# Patient Record
Sex: MALE | Race: WHITE | NOT HISPANIC OR LATINO | Employment: OTHER | URBAN - METROPOLITAN AREA
[De-identification: names, ages, dates, MRNs, and addresses within clinical notes are randomized per-mention and may not be internally consistent; named-entity substitution may affect disease eponyms.]

---

## 2018-06-01 RX ORDER — LISINOPRIL 10 MG/1
10 TABLET ORAL DAILY
Refills: 0 | COMMUNITY
Start: 2018-04-07 | End: 2021-09-02

## 2018-06-01 RX ORDER — ATORVASTATIN CALCIUM 10 MG/1
10 TABLET, FILM COATED ORAL
Refills: 0 | COMMUNITY
Start: 2018-04-07 | End: 2021-09-03 | Stop reason: HOSPADM

## 2018-06-05 ENCOUNTER — OFFICE VISIT (OUTPATIENT)
Dept: UROLOGY | Facility: MEDICAL CENTER | Age: 83
End: 2018-06-05
Payer: COMMERCIAL

## 2018-06-05 VITALS
WEIGHT: 184 LBS | SYSTOLIC BLOOD PRESSURE: 126 MMHG | DIASTOLIC BLOOD PRESSURE: 62 MMHG | BODY MASS INDEX: 27.25 KG/M2 | HEIGHT: 69 IN

## 2018-06-05 DIAGNOSIS — R31.29 OTHER MICROSCOPIC HEMATURIA: ICD-10-CM

## 2018-06-05 DIAGNOSIS — N40.1 BPH WITH OBSTRUCTION/LOWER URINARY TRACT SYMPTOMS: Primary | ICD-10-CM

## 2018-06-05 DIAGNOSIS — N13.8 BPH WITH OBSTRUCTION/LOWER URINARY TRACT SYMPTOMS: Primary | ICD-10-CM

## 2018-06-05 DIAGNOSIS — R97.20 ELEVATED PSA: ICD-10-CM

## 2018-06-05 PROBLEM — I10 ESSENTIAL HYPERTENSION: Status: ACTIVE | Noted: 2017-07-26

## 2018-06-05 PROBLEM — D17.20 LIPOMA OF AXILLA: Status: ACTIVE | Noted: 2018-06-05

## 2018-06-05 PROBLEM — R07.89 ATYPICAL CHEST PAIN: Status: ACTIVE | Noted: 2018-06-05

## 2018-06-05 PROBLEM — E78.2 MIXED HYPERLIPIDEMIA: Status: ACTIVE | Noted: 2018-06-05

## 2018-06-05 LAB
BACTERIA UR QL AUTO: ABNORMAL /HPF
BILIRUB UR QL STRIP: NEGATIVE
CLARITY UR: CLEAR
COLOR UR: YELLOW
GLUCOSE UR STRIP-MCNC: NEGATIVE MG/DL
HGB UR QL STRIP.AUTO: ABNORMAL
HYALINE CASTS #/AREA URNS LPF: ABNORMAL /LPF
KETONES UR STRIP-MCNC: NEGATIVE MG/DL
LEUKOCYTE ESTERASE UR QL STRIP: NEGATIVE
NITRITE UR QL STRIP: NEGATIVE
NON-SQ EPI CELLS URNS QL MICRO: ABNORMAL /HPF
PH UR STRIP.AUTO: 6.5 [PH] (ref 4.5–8)
PROT UR STRIP-MCNC: NEGATIVE MG/DL
RBC #/AREA URNS AUTO: ABNORMAL /HPF
SL AMB  POCT GLUCOSE, UA: ABNORMAL
SL AMB LEUKOCYTE ESTERASE,UA: ABNORMAL
SL AMB POCT BILIRUBIN,UA: ABNORMAL
SL AMB POCT BLOOD,UA: ABNORMAL
SL AMB POCT CLARITY,UA: CLEAR
SL AMB POCT COLOR,UA: YELLOW
SL AMB POCT KETONES,UA: ABNORMAL
SL AMB POCT NITRITE,UA: ABNORMAL
SL AMB POCT PH,UA: 6.5
SL AMB POCT SPECIFIC GRAVITY,UA: 1.01
SL AMB POCT URINE PROTEIN: ABNORMAL
SL AMB POCT UROBILINOGEN: 0.2
SP GR UR STRIP.AUTO: 1.01 (ref 1–1.03)
UROBILINOGEN UR QL STRIP.AUTO: 0.2 E.U./DL
WBC #/AREA URNS AUTO: ABNORMAL /HPF

## 2018-06-05 PROCEDURE — 4040F PNEUMOC VAC/ADMIN/RCVD: CPT | Performed by: UROLOGY

## 2018-06-05 PROCEDURE — 81001 URINALYSIS AUTO W/SCOPE: CPT | Performed by: UROLOGY

## 2018-06-05 PROCEDURE — 99214 OFFICE O/P EST MOD 30 MIN: CPT | Performed by: UROLOGY

## 2018-06-05 PROCEDURE — 81003 URINALYSIS AUTO W/O SCOPE: CPT | Performed by: UROLOGY

## 2018-06-05 RX ORDER — PRAVASTATIN SODIUM 20 MG
TABLET ORAL
COMMUNITY
End: 2018-07-07 | Stop reason: ALTCHOICE

## 2018-06-05 RX ORDER — ACETAMINOPHEN 160 MG
TABLET,DISINTEGRATING ORAL DAILY
COMMUNITY

## 2018-06-05 NOTE — PROGRESS NOTES
Assessment/Plan:    BPH with obstruction/lower urinary tract symptoms  AUA symptom score is 12  Urinalysis reveals small blood on dipstick  Urine will be sent for microscopic analysis  We discussed treatment options for lower tract symptoms  Specifically we discussed the use of medical therapy  He feels he satisfied with his voiding pattern at this time and does not wish to pursue treatment  We will just continue watchful waiting  Elevated PSA  PSA is 6 48 with 25% free (March 23, 2018)  The risk of prostate cancer was discussed  His exam is benign  Prostate biopsy was discussed  At the conclusion were discussion the patient elected continued observation  Diagnoses and all orders for this visit:    BPH with obstruction/lower urinary tract symptoms  -     POCT urine dip auto non-scope    Elevated PSA  -     PSA, total and free; Future    Other microscopic hematuria  -     Urinalysis with microscopic    Other orders  -     atorvastatin (LIPITOR) 10 mg tablet; Take 10 mg by mouth daily at bedtime  -     lisinopril (ZESTRIL) 10 mg tablet; Take 10 mg by mouth daily  -     aspirin 81 MG tablet; Daily  -     pravastatin (PRAVACHOL) 20 mg tablet; pravastatin 20 mg tablet  -     Cholecalciferol (VITAMIN D3) 2000 units capsule; Daily          Subjective:      Patient ID: Sawyer Sweeney is a 80 y o  male  40-year-old male followed for lower tract symptoms and history of PSA elevation  He reports he is voiding adequately  His stream is slow and he does have hesitancy  He gets up once to twice a night to urinate  He has no urgency or incontinence  There is no history of urinary tract infection  He denies gross hematuria, dysuria or symptoms of infection  Overall he is satisfied with his voiding pattern          The following portions of the patient's history were reviewed and updated as appropriate: allergies, current medications, past family history, past medical history, past social history, past surgical history and problem list     Review of Systems   Constitutional: Negative for chills, diaphoresis, fatigue and fever  HENT: Negative  Eyes: Negative  Respiratory: Negative  Cardiovascular: Negative  Endocrine: Negative  Musculoskeletal: Negative  Skin: Negative  Allergic/Immunologic: Negative  Neurological: Positive for dizziness  Hematological: Negative  Psychiatric/Behavioral: Negative  Objective:      /62 (BP Location: Left arm, Patient Position: Sitting)   Ht 5' 9" (1 753 m)   Wt 83 5 kg (184 lb)   BMI 27 17 kg/m²          Physical Exam   Constitutional: He is oriented to person, place, and time  He appears well-developed and well-nourished  HENT:   Head: Normocephalic and atraumatic  Eyes: Conjunctivae are normal    Neck: Neck supple  Cardiovascular: Normal rate  Pulmonary/Chest: Effort normal    Abdominal: Soft  Bowel sounds are normal  He exhibits no distension and no mass  There is no tenderness  There is no rebound, no guarding and no CVA tenderness  Hernia confirmed negative in the right inguinal area and confirmed negative in the left inguinal area  Genitourinary: Rectum normal, testes normal and penis normal  Prostate is enlarged  Prostate is not tender  Right testis shows no mass  Left testis shows no mass  No phimosis or hypospadias  Genitourinary Comments: Prostate 2+ enlarged and palpably benign  Prostate is smooth, symmetric and free of nodularity  Musculoskeletal: He exhibits no edema  Neurological: He is alert and oriented to person, place, and time  Skin: Skin is warm and dry  Psychiatric: He has a normal mood and affect  His behavior is normal  Judgment and thought content normal    Nursing note and vitals reviewed

## 2018-06-05 NOTE — ASSESSMENT & PLAN NOTE
PSA is 6 48 with 25% free (March 23, 2018)  The risk of prostate cancer was discussed  His exam is benign  Prostate biopsy was discussed  At the conclusion were discussion the patient elected continued observation

## 2018-06-05 NOTE — PATIENT INSTRUCTIONS
Prostate Specific Antigen   AMBULATORY CARE:   A prostate specific antigen (PSA) test  is a blood test used to screen men for prostate cancer  A PSA test is also used to monitor how well prostate cancer treatment is working  Other test that may be done with a PSA test:  A digital rectal exam is usually performed with a PSA test  Your healthcare provider will insert a gloved finger into your rectum to feel if your prostate is large, firm, or has lumps  Who may need a PSA test:  Some experts recommend a PSA test for men ages 48 to 79  They also recommend testing men with a high risk for prostate cancer at age 36 or 39  Risk factors may include being  or having a brother or father with prostate cancer  Other experts may not recommend PSA testing  Your healthcare provider can help you decide if you need a PSA test    What the results of a PSA test mean:  Most healthy men have a PSA level less than 4 ng/mL  If your PSA level is higher than 4 ng/mL you may need more tests  Examples include blood or urine tests, an ultrasound, MRI, CT scan, or a prostate biopsy  Ask your healthcare provider for more information on these tests  Other causes of a high PSA level:  A high PSA level does not always mean you have prostate cancer  Certain conditions or procedures can increase PSA levels  Examples include:  · Older age    · Procedures such as a prostate biopsy or a cystoscopy    · An enlarged prostate    · Recent sexual activity    · A prostate infection    · Certain exercises that put pressure on the prostate such as bicycling    · Medicine such as testosterone  © 2017 [employer] Information is for End User's use only and may not be sold, redistributed or otherwise used for commercial purposes  All illustrations and images included in CareNotes® are the copyrighted property of [employer]  or Stephan Enriquez  The above information is an  only   It is not intended as medical advice for individual conditions or treatments  Talk to your doctor, nurse or pharmacist before following any medical regimen to see if it is safe and effective for you

## 2018-06-05 NOTE — LETTER
June 5, 2018     [address]  [address] [facility] [address]    Patient: Terry Woodard   YOB: 1935   Date of Visit: 6/5/2018       Dear Dr Rubio Pina:    Thank you for referring Vannessa Santiago to me for evaluation  Below are my notes for this consultation  If you have questions, please do not hesitate to call me  I look forward to following your patient along with you  Sincerely,        Hernan Archer MD        CC: No Recipients  Hernan Archer MD  6/5/2018  1:34 PM  Incomplete  Assessment/Plan:    No problem-specific Assessment & Plan notes found for this encounter  Diagnoses and all orders for this visit:    BPH with obstruction/lower urinary tract symptoms  -     POCT urine dip auto non-scope    Other orders  -     atorvastatin (LIPITOR) 10 mg tablet; Take 10 mg by mouth daily at bedtime  -     lisinopril (ZESTRIL) 10 mg tablet; Take 10 mg by mouth daily  -     aspirin 81 MG tablet; Daily  -     pravastatin (PRAVACHOL) 20 mg tablet; pravastatin 20 mg tablet  -     Cholecalciferol (VITAMIN D3) 2000 units capsule; Daily          Subjective:      Patient ID: Terry Woodard is a 80 y o  male      HPI    The following portions of the patient's history were reviewed and updated as appropriate: allergies, current medications, past family history, past medical history, past social history, past surgical history and problem list     Review of Systems      Objective:      /62 (BP Location: Left arm, Patient Position: Sitting)   Ht 5' 9" (1 753 m)   Wt 83 5 kg (184 lb)   BMI 27 17 kg/m²           Physical Exam

## 2018-06-05 NOTE — ASSESSMENT & PLAN NOTE
AUA symptom score is 12  Urinalysis reveals small blood on dipstick  Urine will be sent for microscopic analysis  We discussed treatment options for lower tract symptoms  Specifically we discussed the use of medical therapy  He feels he satisfied with his voiding pattern at this time and does not wish to pursue treatment  We will just continue watchful waiting

## 2018-06-05 NOTE — LETTER
June 5, 2018     [address]  [address] [facility] [address]    Patient: Danny Guerrero   YOB: 1935   Date of Visit: 6/5/2018       Dear Dr Wallace Mohs:    Thank you for referring Ketty Goddard to me for evaluation  Below are my notes for this consultation  If you have questions, please do not hesitate to call me  I look forward to following your patient along with you  Sincerely,        Teresa Gleason MD        CC: No Recipients  Teresa Gleason MD  6/5/2018  1:34 PM  Incomplete  Assessment/Plan:    No problem-specific Assessment & Plan notes found for this encounter  Diagnoses and all orders for this visit:    BPH with obstruction/lower urinary tract symptoms  -     POCT urine dip auto non-scope    Other orders  -     atorvastatin (LIPITOR) 10 mg tablet; Take 10 mg by mouth daily at bedtime  -     lisinopril (ZESTRIL) 10 mg tablet; Take 10 mg by mouth daily  -     aspirin 81 MG tablet; Daily  -     pravastatin (PRAVACHOL) 20 mg tablet; pravastatin 20 mg tablet  -     Cholecalciferol (VITAMIN D3) 2000 units capsule; Daily          Subjective:      Patient ID: Danny Guerrero is a 80 y o  male      HPI    The following portions of the patient's history were reviewed and updated as appropriate: allergies, current medications, past family history, past medical history, past social history, past surgical history and problem list     Review of Systems      Objective:      /62 (BP Location: Left arm, Patient Position: Sitting)   Ht 5' 9" (1 753 m)   Wt 83 5 kg (184 lb)   BMI 27 17 kg/m²           Physical Exam

## 2018-06-05 NOTE — PROGRESS NOTES
IPSS Questionnaire (AUA-7): Over the past month    1)  How often have you had a sensation of not emptying your bladder completely after you finish urinating? 2 - Less than half the time   2)  How often have you had to urinate again less than two hours after you finished urinating? 2 - Less than half the time   3)  How often have you found you stopped and started again several times when you urinated? 1 - Less than 1 time in 5   4) How difficult have you found it to postpone urination? 1 - Less than 1 time in 5   5) How often have you had a weak urinary stream?  2 - Less than half the time   6) How often have you had to push or strain to begin urination? 0 - Not at all   7) How many times did you most typically get up to urinate from the time you went to bed until the time you got up in the morning?   4 - 4 times   Total Score:  12

## 2018-06-13 DIAGNOSIS — R31.29 OTHER MICROSCOPIC HEMATURIA: Primary | ICD-10-CM

## 2018-07-07 ENCOUNTER — APPOINTMENT (EMERGENCY)
Dept: RADIOLOGY | Facility: HOSPITAL | Age: 83
DRG: 948 | End: 2018-07-07
Payer: COMMERCIAL

## 2018-07-07 ENCOUNTER — APPOINTMENT (EMERGENCY)
Dept: CT IMAGING | Facility: HOSPITAL | Age: 83
DRG: 948 | End: 2018-07-07
Payer: COMMERCIAL

## 2018-07-07 ENCOUNTER — HOSPITAL ENCOUNTER (INPATIENT)
Facility: HOSPITAL | Age: 83
LOS: 2 days | Discharge: HOME | DRG: 948 | End: 2018-07-10
Attending: EMERGENCY MEDICINE | Admitting: HOSPITALIST
Payer: COMMERCIAL

## 2018-07-07 DIAGNOSIS — R53.1 GENERALIZED WEAKNESS: ICD-10-CM

## 2018-07-07 DIAGNOSIS — R50.9 FEVER: Primary | ICD-10-CM

## 2018-07-07 PROBLEM — E87.1 HYPONATREMIA: Status: ACTIVE | Noted: 2018-07-07

## 2018-07-07 PROBLEM — D69.6 THROMBOCYTOPENIA (HCC): Status: ACTIVE | Noted: 2018-07-07

## 2018-07-07 LAB
ALBUMIN SERPL BCP-MCNC: 3 G/DL (ref 3.5–5)
ALP SERPL-CCNC: 75 U/L (ref 46–116)
ALT SERPL W P-5'-P-CCNC: 46 U/L (ref 12–78)
ANION GAP SERPL CALCULATED.3IONS-SCNC: 8 MMOL/L (ref 4–13)
AST SERPL W P-5'-P-CCNC: 50 U/L (ref 5–45)
BACTERIA UR QL AUTO: ABNORMAL /HPF
BASOPHILS # BLD AUTO: 0.01 THOUSANDS/ΜL (ref 0–0.1)
BASOPHILS NFR BLD AUTO: 0 % (ref 0–1)
BILIRUB SERPL-MCNC: 0.7 MG/DL (ref 0.2–1)
BILIRUB UR QL STRIP: NEGATIVE
BUN SERPL-MCNC: 16 MG/DL (ref 5–25)
CALCIUM SERPL-MCNC: 8.2 MG/DL (ref 8.3–10.1)
CHLORIDE SERPL-SCNC: 100 MMOL/L (ref 100–108)
CLARITY UR: ABNORMAL
CO2 SERPL-SCNC: 26 MMOL/L (ref 21–32)
COLOR UR: ABNORMAL
CREAT SERPL-MCNC: 1.23 MG/DL (ref 0.6–1.3)
EOSINOPHIL # BLD AUTO: 0 THOUSAND/ΜL (ref 0–0.61)
EOSINOPHIL NFR BLD AUTO: 0 % (ref 0–6)
ERYTHROCYTE [DISTWIDTH] IN BLOOD BY AUTOMATED COUNT: 13.1 % (ref 11.6–15.1)
GFR SERPL CREATININE-BSD FRML MDRD: 54 ML/MIN/1.73SQ M
GLUCOSE SERPL-MCNC: 145 MG/DL (ref 65–140)
GLUCOSE UR STRIP-MCNC: NEGATIVE MG/DL
HCT VFR BLD AUTO: 44 % (ref 36.5–49.3)
HGB BLD-MCNC: 14.8 G/DL (ref 12–17)
HGB UR QL STRIP.AUTO: ABNORMAL
KETONES UR STRIP-MCNC: ABNORMAL MG/DL
LEUKOCYTE ESTERASE UR QL STRIP: NEGATIVE
LYMPHOCYTES # BLD AUTO: 0.55 THOUSANDS/ΜL (ref 0.6–4.47)
LYMPHOCYTES NFR BLD AUTO: 9 % (ref 14–44)
MCH RBC QN AUTO: 31.4 PG (ref 26.8–34.3)
MCHC RBC AUTO-ENTMCNC: 33.6 G/DL (ref 31.4–37.4)
MCV RBC AUTO: 93 FL (ref 82–98)
MONOCYTES # BLD AUTO: 0.4 THOUSAND/ΜL (ref 0.17–1.22)
MONOCYTES NFR BLD AUTO: 6 % (ref 4–12)
MUCOUS THREADS UR QL AUTO: ABNORMAL
NEUTROPHILS # BLD AUTO: 5.43 THOUSANDS/ΜL (ref 1.85–7.62)
NEUTS SEG NFR BLD AUTO: 85 % (ref 43–75)
NITRITE UR QL STRIP: NEGATIVE
NON-SQ EPI CELLS URNS QL MICRO: ABNORMAL /HPF
PH UR STRIP.AUTO: 5.5 [PH] (ref 4.5–8)
PLATELET # BLD AUTO: 94 THOUSANDS/UL (ref 149–390)
PMV BLD AUTO: 9.9 FL (ref 8.9–12.7)
POTASSIUM SERPL-SCNC: 4.2 MMOL/L (ref 3.5–5.3)
PROT SERPL-MCNC: 6.8 G/DL (ref 6.4–8.2)
PROT UR STRIP-MCNC: ABNORMAL MG/DL
RBC # BLD AUTO: 4.71 MILLION/UL (ref 3.88–5.62)
RBC #/AREA URNS AUTO: ABNORMAL /HPF
SODIUM SERPL-SCNC: 134 MMOL/L (ref 136–145)
SP GR UR STRIP.AUTO: >=1.03 (ref 1–1.03)
UROBILINOGEN UR QL STRIP.AUTO: 1 E.U./DL
WBC # BLD AUTO: 6.39 THOUSAND/UL (ref 4.31–10.16)
WBC #/AREA URNS AUTO: ABNORMAL /HPF

## 2018-07-07 PROCEDURE — 36415 COLL VENOUS BLD VENIPUNCTURE: CPT | Performed by: EMERGENCY MEDICINE

## 2018-07-07 PROCEDURE — 81001 URINALYSIS AUTO W/SCOPE: CPT

## 2018-07-07 PROCEDURE — 80053 COMPREHEN METABOLIC PANEL: CPT | Performed by: EMERGENCY MEDICINE

## 2018-07-07 PROCEDURE — 71045 X-RAY EXAM CHEST 1 VIEW: CPT

## 2018-07-07 PROCEDURE — 99285 EMERGENCY DEPT VISIT HI MDM: CPT

## 2018-07-07 PROCEDURE — 85025 COMPLETE CBC W/AUTO DIFF WBC: CPT | Performed by: EMERGENCY MEDICINE

## 2018-07-07 PROCEDURE — 99220 PR INITIAL OBSERVATION CARE/DAY 70 MINUTES: CPT | Performed by: HOSPITALIST

## 2018-07-07 PROCEDURE — 84145 PROCALCITONIN (PCT): CPT | Performed by: HOSPITALIST

## 2018-07-07 PROCEDURE — 74176 CT ABD & PELVIS W/O CONTRAST: CPT

## 2018-07-07 PROCEDURE — 93005 ELECTROCARDIOGRAM TRACING: CPT

## 2018-07-07 PROCEDURE — 96360 HYDRATION IV INFUSION INIT: CPT

## 2018-07-07 PROCEDURE — 96361 HYDRATE IV INFUSION ADD-ON: CPT

## 2018-07-07 RX ORDER — AMOXICILLIN 250 MG
1 CAPSULE ORAL
Status: DISCONTINUED | OUTPATIENT
Start: 2018-07-07 | End: 2018-07-10 | Stop reason: HOSPADM

## 2018-07-07 RX ORDER — CALCIUM CARBONATE 200(500)MG
1000 TABLET,CHEWABLE ORAL 3 TIMES DAILY PRN
Status: DISCONTINUED | OUTPATIENT
Start: 2018-07-07 | End: 2018-07-10 | Stop reason: HOSPADM

## 2018-07-07 RX ORDER — ATORVASTATIN CALCIUM 10 MG/1
10 TABLET, FILM COATED ORAL
Status: DISCONTINUED | OUTPATIENT
Start: 2018-07-07 | End: 2018-07-10 | Stop reason: HOSPADM

## 2018-07-07 RX ORDER — ONDANSETRON 2 MG/ML
INJECTION INTRAMUSCULAR; INTRAVENOUS
Status: COMPLETED
Start: 2018-07-07 | End: 2018-07-07

## 2018-07-07 RX ORDER — ACETAMINOPHEN 325 MG/1
650 TABLET ORAL EVERY 6 HOURS PRN
Status: DISCONTINUED | OUTPATIENT
Start: 2018-07-07 | End: 2018-07-10 | Stop reason: HOSPADM

## 2018-07-07 RX ORDER — SODIUM CHLORIDE 9 MG/ML
125 INJECTION, SOLUTION INTRAVENOUS CONTINUOUS
Status: DISCONTINUED | OUTPATIENT
Start: 2018-07-07 | End: 2018-07-09

## 2018-07-07 RX ORDER — ONDANSETRON 2 MG/ML
4 INJECTION INTRAMUSCULAR; INTRAVENOUS EVERY 6 HOURS PRN
Status: DISCONTINUED | OUTPATIENT
Start: 2018-07-07 | End: 2018-07-10 | Stop reason: HOSPADM

## 2018-07-07 RX ORDER — ACETAMINOPHEN 325 MG/1
650 TABLET ORAL ONCE
Status: COMPLETED | OUTPATIENT
Start: 2018-07-07 | End: 2018-07-07

## 2018-07-07 RX ORDER — METOCLOPRAMIDE HYDROCHLORIDE 5 MG/ML
10 INJECTION INTRAMUSCULAR; INTRAVENOUS EVERY 6 HOURS PRN
Status: DISCONTINUED | OUTPATIENT
Start: 2018-07-07 | End: 2018-07-10 | Stop reason: HOSPADM

## 2018-07-07 RX ORDER — ASPIRIN 81 MG/1
81 TABLET, CHEWABLE ORAL DAILY
Status: DISCONTINUED | OUTPATIENT
Start: 2018-07-07 | End: 2018-07-10 | Stop reason: HOSPADM

## 2018-07-07 RX ADMIN — ATORVASTATIN CALCIUM 10 MG: 10 TABLET, FILM COATED ORAL at 22:13

## 2018-07-07 RX ADMIN — ACETAMINOPHEN 650 MG: 325 TABLET, FILM COATED ORAL at 15:40

## 2018-07-07 RX ADMIN — Medication 1 TABLET: at 22:13

## 2018-07-07 RX ADMIN — ASPIRIN 81 MG 81 MG: 81 TABLET ORAL at 15:14

## 2018-07-07 RX ADMIN — ENOXAPARIN SODIUM 40 MG: 100 INJECTION SUBCUTANEOUS at 15:14

## 2018-07-07 RX ADMIN — ONDANSETRON 4 MG: 2 INJECTION INTRAMUSCULAR; INTRAVENOUS at 16:12

## 2018-07-07 RX ADMIN — ACETAMINOPHEN 650 MG: 325 TABLET, FILM COATED ORAL at 08:37

## 2018-07-07 RX ADMIN — SODIUM CHLORIDE 1000 ML: 0.9 INJECTION, SOLUTION INTRAVENOUS at 08:12

## 2018-07-07 RX ADMIN — METOCLOPRAMIDE 10 MG: 5 INJECTION, SOLUTION INTRAMUSCULAR; INTRAVENOUS at 17:16

## 2018-07-07 RX ADMIN — SODIUM CHLORIDE 125 ML/HR: 0.9 INJECTION, SOLUTION INTRAVENOUS at 14:00

## 2018-07-07 NOTE — SEPSIS NOTE
Sepsis Note   Carie Riedel 80 y o  male MRN: [id number]  Unit/Bed#: ED 11 Encounter: [id number]            Initial Sepsis Screening     Row Name 07/07/18 1049                Is the patient's history suggestive of a new or worsening infection? (!)  Yes (Proceed)  -MB        Suspected source of infection suspect infection, source unknown  -MB        Are two or more of the following signs & symptoms of infection both present and new to the patient? No  -MB        Indicate SIRS criteria  --        If the answer is yes to both questions, suspicion of sepsis is present  --        If severe sepsis is present AND tissue hypoperfusion perists in the hour after fluid resuscitation or lactate > 4, the patient meets criteria for SEPTIC SHOCK  --        Are any of the following organ dysfunction criteria present within 6 hours of suspected infection and SIRS criteria that are NOT considered to be chronic conditions?   --        Organ dysfunction  --        Date of presentation of severe sepsis  --        Time of presentation of severe sepsis  --        Tissue hypoperfusion persists in the hour after crystalloid fluid administration, evidenced, by either:  --        Was hypotension present within one hour of the conclusion of crystalloid fluid administration?  --        Date of presentation of septic shock  --        Time of presentation of septic shock  --          User Key  (r) = Recorded By, (t) = Taken By, (c) = Cosigned By    [address] Name Provider Type    KYUNG Uribe MD Physician

## 2018-07-07 NOTE — ED PROCEDURE NOTE
PROCEDURE  ECG 12 Lead Documentation  Date/Time: 7/7/2018 8:08 AM  Performed by: Georges Tracy  Authorized by: Deb SANCHEZ     Indications / Diagnosis:  Weakness/ near syncope  ECG reviewed by me, the ED Provider: yes    Patient location:  ED  Previous ECG:     Previous ECG:  Unavailable    Comparison to cardiac monitor: Yes    Interpretation:     Interpretation: non-specific    Rate:     ECG rate:  77    ECG rate assessment: normal    Rhythm:     Rhythm: sinus rhythm    Ectopy:     Ectopy: none    QRS:     QRS axis:  Normal    QRS intervals:   Wide  Conduction:     Conduction: abnormal      Abnormal conduction: complete RBBB and 1st degree    ST segments:     ST segments:  Normal  T waves:     T waves: flattening and inverted      Flattening:  AVL, III and V5    Inverted:  V1, V2, V3 and V4  Q waves:     Q waves:  III and aVF  Other findings:     Other findings: U wave    Comments:      No ecg signs of ischemia/ injury          Dany Gutierres MD  07/07/18 568

## 2018-07-07 NOTE — ASSESSMENT & PLAN NOTE
Incidentally noted on labs; pt unaware of this issue  Will trend; ?if associated with occult infection

## 2018-07-07 NOTE — ED PROVIDER NOTES
History  Chief Complaint   Patient presents with    Weakness - Generalized     pt reports generalized weakness over the past few days, episodes of near syncope when standing up, +dry heaves, fever 101 1 for EMS     80 yr male   With 3 days of weakness- near syncope attimes- nausea- dry heaves- and fever today - chills last night- no ill contact- no v/df-no uri/cough - no gu comps- no rash - no travel        History provided by:  Patient, spouse and relative   used: No        Prior to Admission Medications   Prescriptions Last Dose Informant Patient Reported? Taking? Cholecalciferol (VITAMIN D3) 2000 units capsule   Yes Yes   Sig: Daily   aspirin 81 MG tablet   Yes Yes   Sig: Daily   atorvastatin (LIPITOR) 10 mg tablet   Yes Yes   Sig: Take 10 mg by mouth daily at bedtime   lisinopril (ZESTRIL) 10 mg tablet   Yes Yes   Sig: Take 10 mg by mouth daily      Facility-Administered Medications: None       Past Medical History:   Diagnosis Date    BPH with obstruction/lower urinary tract symptoms     Cataract     Elevated PSA     Hypercholesterolemia     Hypertension     Poor urinary stream        Past Surgical History:   Procedure Laterality Date    CATARACT EXTRACTION      KNEE SURGERY  2005    PROSTATE BIOPSY  2005    ROTATOR CUFF REPAIR  06/15/2015       Family History   Problem Relation Age of Onset    Diabetes Mother     Heart disease Mother     Cancer Father     Diabetes Father      I have reviewed and agree with the history as documented  Social History   Substance Use Topics    Smoking status: Former Smoker    Smokeless tobacco: Never Used    Alcohol use 8 4 oz/week     14 Cans of beer per week      Comment: 1-2 beers a day        Review of Systems   Constitutional: Positive for activity change, appetite change, chills, fatigue and fever  Negative for diaphoresis and unexpected weight change  HENT: Negative  Eyes: Negative  Respiratory: Negative  Cardiovascular: Negative  Gastrointestinal: Positive for nausea  Negative for abdominal distention, abdominal pain, anal bleeding, blood in stool, constipation, diarrhea, rectal pain and vomiting  Endocrine: Negative  Genitourinary: Negative  Musculoskeletal: Negative  Skin: Negative  Allergic/Immunologic: Negative  Neurological: Negative  Hematological: Negative  Psychiatric/Behavioral: Negative  Physical Exam  Physical Exam   Constitutional: He is oriented to person, place, and time  He appears well-developed and well-nourished  No distress  Febrile- vss-- in nad - non toxic appearing-- pulse ox 94 % on ra- intepretation is normal- no intervention    HENT:   Head: Normocephalic and atraumatic  Right Ear: External ear normal    Left Ear: External ear normal    Nose: Nose normal    Mouth/Throat: Oropharynx is clear and moist  No oropharyngeal exudate  No bilateral maxillary/fotnal sinus tendenress/edema/ erythemaa   Eyes: Conjunctivae and EOM are normal  Pupils are equal, round, and reactive to light  Right eye exhibits no discharge  Left eye exhibits no discharge  No scleral icterus  Mm pink   Neck: Normal range of motion  Neck supple  No JVD present  No tracheal deviation present  No thyromegaly present  No meningeal signs   Cardiovascular: Normal rate, regular rhythm, normal heart sounds and intact distal pulses  Exam reveals no gallop and no friction rub  No murmur heard  Pulmonary/Chest: Effort normal and breath sounds normal  No stridor  No respiratory distress  He has no wheezes  He has no rales  He exhibits no tenderness  Abdominal: Soft  Bowel sounds are normal  He exhibits no distension and no mass  There is tenderness  There is no rebound and no guarding  No hernia     Mild llq tendneress- no peritoenal signs- no cva tendenress/    Genitourinary: Penis normal    Genitourinary Comments: Normal testilce/scrotla/periernal exam --    Musculoskeletal: Normal range of motion  He exhibits no edema, tenderness or deformity  Lymphadenopathy:     He has no cervical adenopathy  Neurological: He is alert and oriented to person, place, and time  No cranial nerve deficit or sensory deficit  He exhibits normal muscle tone  Coordination normal    No nystagmus- neg test of sckew/ normal finger to nose bilaterally    Skin: Skin is warm  Capillary refill takes less than 2 seconds  No rash noted  He is not diaphoretic  No erythema  No pallor  Psychiatric: He has a normal mood and affect  His behavior is normal  Judgment and thought content normal    Nursing note and vitals reviewed        Vital Signs  ED Triage Vitals   Temperature Pulse Respirations Blood Pressure SpO2   07/07/18 0747 07/07/18 0752 07/07/18 0747 07/07/18 0747 07/07/18 0752   100 3 °F (37 9 °C) 76 20 136/63 96 %      Temp Source Heart Rate Source Patient Position - Orthostatic VS BP Location FiO2 (%)   07/07/18 0747 07/07/18 0752 07/07/18 0747 07/07/18 0747 --   Oral Monitor Sitting Right arm       Pain Score       07/07/18 0747       No Pain           Vitals:    07/07/18 0747 07/07/18 0752   BP: 136/63    Pulse:  76   Patient Position - Orthostatic VS: Sitting        Visual Acuity      ED Medications  Medications   ondansetron (ZOFRAN) 4 mg/2 mL injection **AcuDose Override Pull** (  Given to EMS 7/7/18 0754)   acetaminophen (TYLENOL) tablet 650 mg (650 mg Oral Given 7/7/18 0837)   sodium chloride 0 9 % bolus 1,000 mL (1,000 mL Intravenous New Bag 7/7/18 0812)       Diagnostic Studies  Results Reviewed     Procedure Component Value Units Date/Time    Urine Microscopic [[id number]]  (Abnormal) Collected:  07/07/18 0854    Lab Status:  Final result Specimen:  Urine from Urine, Clean Catch Updated:  07/07/18 0905     RBC, UA None Seen /hpf      WBC, UA 0-1 (A) /hpf      Epithelial Cells Occasional /hpf      Bacteria, UA Occasional /hpf      MUCOUS THREADS Occasional (A)    CBC and differential [[id number]]  (Abnormal) Collected:  07/07/18 0810    Lab Status:  Final result Specimen:  Blood from Arm, Left Updated:  07/07/18 0903     WBC 6 39 Thousand/uL      RBC 4 71 Million/uL      Hemoglobin 14 8 g/dL      Hematocrit 44 0 %      MCV 93 fL      MCH 31 4 pg      MCHC 33 6 g/dL      RDW 13 1 %      MPV 9 9 fL      Platelets 94 (L) Thousands/uL      Neutrophils Relative 85 (H) %      Lymphocytes Relative 9 (L) %      Monocytes Relative 6 %      Eosinophils Relative 0 %      Basophils Relative 0 %      Neutrophils Absolute 5 43 Thousands/µL      Lymphocytes Absolute 0 55 (L) Thousands/µL      Monocytes Absolute 0 40 Thousand/µL      Eosinophils Absolute 0 00 Thousand/µL      Basophils Absolute 0 01 Thousands/µL     ED Urine Macroscopic [[id number]]  (Abnormal) Collected:  07/07/18 0854    Lab Status:  Final result Specimen:  Urine Updated:  07/07/18 0847     Color, UA Yanni     Clarity, UA Slightly Cloudy     pH, UA 5 5     Leukocytes, UA Negative     Nitrite, UA Negative     Protein,  (2+) (A) mg/dl      Glucose, UA Negative mg/dl      Ketones, UA Trace (A) mg/dl      Urobilinogen, UA 1 0 E U /dl      Bilirubin, UA Negative     Blood, UA Moderate (A)     Specific Gravity, UA >=1 030    Narrative:       CLINITEK RESULT    Comprehensive metabolic panel [[id number]]  (Abnormal) Collected:  07/07/18 0810    Lab Status:  Final result Specimen:  Blood from Arm, Left Updated:  07/07/18 0836     Sodium 134 (L) mmol/L      Potassium 4 2 mmol/L      Chloride 100 mmol/L      CO2 26 mmol/L      Anion Gap 8 mmol/L      BUN 16 mg/dL      Creatinine 1 23 mg/dL      Glucose 145 (H) mg/dL      Calcium 8 2 (L) mg/dL      AST 50 (H) U/L      ALT 46 U/L      Alkaline Phosphatase 75 U/L      Total Protein 6 8 g/dL      Albumin 3 0 (L) g/dL      Total Bilirubin 0 70 mg/dL      eGFR 54 ml/min/1 73sq m     Narrative:         National Kidney Disease Education Program recommendations are as follows:  GFR calculation is accurate only with a steady state creatinine  Chronic Kidney disease less than 60 ml/min/1 73 sq  meters  Kidney failure less than 15 ml/min/1 73 sq  meters  XR chest 1 view portable    (Results Pending)   CT abdomen pelvis wo contrast    (Results Pending)              Procedures  Procedures       Phone Contacts  ED Phone Contact    ED Course  ED Course as of Jul 07 0923   Sat Jul 07, 2018   1237 Cxr portable- no free/sq air- normal mediastinum- no evidence of infiltrate/ ptx/ pulm edema/ pleural effusions                                [facility]  CritCare Time    Disposition  Final diagnoses:   None     ED Disposition     None      Follow-up Information    None         Patient's Medications   Discharge Prescriptions    No medications on file     No discharge procedures on file      ED Provider  Electronically Signed by           Terese Carolina MD  07/07/18 5021       Terese Carolina MD  07/07/18 0780

## 2018-07-07 NOTE — ED NOTES
Pt assisted to edge of bed to use urinal  Pt states " I feel like I did when I came in" Vital signs stable, pt seemed to be slightly confused and not following directions well  Pt kept falling back in the bed while trying to use the urinal, pt helped back into bed by 3 nurses and vitals reassessed        Jay Colbert RN  07/07/18 4290

## 2018-07-07 NOTE — ASSESSMENT & PLAN NOTE
Nausea with dry heaves since Wednesday   Overall very tired and weak  EMS noted fever 101 en route  Afebrile currently without significant leukocytosis  UA suggestive of dehydration and poor oral intake  Check procalcitonin  Hold empiric antibiotics   Supportive care (IVF, APAP, antiemetics)

## 2018-07-08 LAB
ALBUMIN SERPL BCP-MCNC: 2.3 G/DL (ref 3.5–5)
ALP SERPL-CCNC: 60 U/L (ref 46–116)
ALT SERPL W P-5'-P-CCNC: 43 U/L (ref 12–78)
ANION GAP SERPL CALCULATED.3IONS-SCNC: 6 MMOL/L (ref 4–13)
AST SERPL W P-5'-P-CCNC: 55 U/L (ref 5–45)
BASOPHILS # BLD MANUAL: 0 THOUSAND/UL (ref 0–0.1)
BASOPHILS NFR MAR MANUAL: 0 % (ref 0–1)
BILIRUB SERPL-MCNC: 0.7 MG/DL (ref 0.2–1)
BUN SERPL-MCNC: 17 MG/DL (ref 5–25)
CALCIUM SERPL-MCNC: 7.6 MG/DL (ref 8.3–10.1)
CHLORIDE SERPL-SCNC: 104 MMOL/L (ref 100–108)
CO2 SERPL-SCNC: 24 MMOL/L (ref 21–32)
CREAT SERPL-MCNC: 1.04 MG/DL (ref 0.6–1.3)
EOSINOPHIL # BLD MANUAL: 0 THOUSAND/UL (ref 0–0.4)
EOSINOPHIL NFR BLD MANUAL: 0 % (ref 0–6)
ERYTHROCYTE [DISTWIDTH] IN BLOOD BY AUTOMATED COUNT: 13.1 % (ref 11.6–15.1)
GFR SERPL CREATININE-BSD FRML MDRD: 67 ML/MIN/1.73SQ M
GLUCOSE SERPL-MCNC: 110 MG/DL (ref 65–140)
HCT VFR BLD AUTO: 39.3 % (ref 36.5–49.3)
HGB BLD-MCNC: 13.1 G/DL (ref 12–17)
LYMPHOCYTES # BLD AUTO: 0.78 THOUSAND/UL (ref 0.6–4.47)
LYMPHOCYTES # BLD AUTO: 15 % (ref 14–44)
MCH RBC QN AUTO: 31.5 PG (ref 26.8–34.3)
MCHC RBC AUTO-ENTMCNC: 33.3 G/DL (ref 31.4–37.4)
MCV RBC AUTO: 95 FL (ref 82–98)
MONOCYTES # BLD AUTO: 0.31 THOUSAND/UL (ref 0–1.22)
MONOCYTES NFR BLD: 6 % (ref 4–12)
NEUTROPHILS # BLD MANUAL: 4.03 THOUSAND/UL (ref 1.85–7.62)
NEUTS BAND NFR BLD MANUAL: 4 % (ref 0–8)
NEUTS SEG NFR BLD AUTO: 73 % (ref 43–75)
PLATELET # BLD AUTO: 93 THOUSANDS/UL (ref 149–390)
PLATELET BLD QL SMEAR: ABNORMAL
PMV BLD AUTO: 10.2 FL (ref 8.9–12.7)
POTASSIUM SERPL-SCNC: 4.3 MMOL/L (ref 3.5–5.3)
PROCALCITONIN SERPL-MCNC: 0.39 NG/ML
PROT SERPL-MCNC: 5.7 G/DL (ref 6.4–8.2)
RBC # BLD AUTO: 4.16 MILLION/UL (ref 3.88–5.62)
SODIUM SERPL-SCNC: 134 MMOL/L (ref 136–145)
TOTAL CELLS COUNTED SPEC: 100
VARIANT LYMPHS # BLD AUTO: 2 %
WBC # BLD AUTO: 5.23 THOUSAND/UL (ref 4.31–10.16)

## 2018-07-08 PROCEDURE — 87207 SMEAR SPECIAL STAIN: CPT | Performed by: HOSPITALIST

## 2018-07-08 PROCEDURE — 87798 DETECT AGENT NOS DNA AMP: CPT | Performed by: HOSPITALIST

## 2018-07-08 PROCEDURE — 86618 LYME DISEASE ANTIBODY: CPT | Performed by: HOSPITALIST

## 2018-07-08 PROCEDURE — 80053 COMPREHEN METABOLIC PANEL: CPT | Performed by: HOSPITALIST

## 2018-07-08 PROCEDURE — 85007 BL SMEAR W/DIFF WBC COUNT: CPT | Performed by: HOSPITALIST

## 2018-07-08 PROCEDURE — 85027 COMPLETE CBC AUTOMATED: CPT | Performed by: HOSPITALIST

## 2018-07-08 PROCEDURE — 99232 SBSQ HOSP IP/OBS MODERATE 35: CPT | Performed by: HOSPITALIST

## 2018-07-08 RX ORDER — DOXYCYCLINE HYCLATE 100 MG/1
100 CAPSULE ORAL EVERY 12 HOURS SCHEDULED
Status: DISCONTINUED | OUTPATIENT
Start: 2018-07-08 | End: 2018-07-10 | Stop reason: HOSPADM

## 2018-07-08 RX ADMIN — DOXYCYCLINE 100 MG: 100 CAPSULE ORAL at 21:24

## 2018-07-08 RX ADMIN — SODIUM CHLORIDE 125 ML/HR: 0.9 INJECTION, SOLUTION INTRAVENOUS at 00:09

## 2018-07-08 RX ADMIN — ONDANSETRON 4 MG: 2 INJECTION INTRAMUSCULAR; INTRAVENOUS at 16:35

## 2018-07-08 RX ADMIN — ASPIRIN 81 MG 81 MG: 81 TABLET ORAL at 08:07

## 2018-07-08 RX ADMIN — SODIUM CHLORIDE 125 ML/HR: 0.9 INJECTION, SOLUTION INTRAVENOUS at 15:39

## 2018-07-08 RX ADMIN — METOCLOPRAMIDE 10 MG: 5 INJECTION, SOLUTION INTRAMUSCULAR; INTRAVENOUS at 09:35

## 2018-07-08 RX ADMIN — Medication 1 TABLET: at 21:24

## 2018-07-08 RX ADMIN — ENOXAPARIN SODIUM 40 MG: 100 INJECTION SUBCUTANEOUS at 08:07

## 2018-07-08 RX ADMIN — ONDANSETRON 4 MG: 2 INJECTION INTRAMUSCULAR; INTRAVENOUS at 05:28

## 2018-07-08 RX ADMIN — DOXYCYCLINE 100 MG: 100 CAPSULE ORAL at 10:33

## 2018-07-08 RX ADMIN — ATORVASTATIN CALCIUM 10 MG: 10 TABLET, FILM COATED ORAL at 21:24

## 2018-07-08 RX ADMIN — SODIUM CHLORIDE 125 ML/HR: 0.9 INJECTION, SOLUTION INTRAVENOUS at 23:43

## 2018-07-08 NOTE — CASE MANAGEMENT
Initial Clinical Review    PATIENT WAS OBSERVATION STATUS 7/7/18 CONVERTED TO INPATIENT ADMISSION 7/8/18     Admission: Date/Time/Statement: 7/8/18 @ [facility]   Inpatient Admission (Order [id number])   Admission   Date: 7/8/2018 Department: [facility] [address] Floor Med Surg Unit Released By/Authorizing: Danielle Stanley MD (auto-released)   Order Information     Order Name   INPATIENT ADMISSION [[id number]]     Order History   Inpatient   Date/Time Action Taken User Additional Information   07/08/18 104 [address], MD (auto-released) From Order: [id number]   07/08/18 0941 Complete Danielle Stanley MD    Order Details     Frequency Duration Priority Order Class   Once 1  occurrence Routine Hospital Performed   Inpatient Admission   Order: [id number]   Status:  Completed   Visible to patient:  No (Not Released) Next appt:  06/12/2019 at 11:30 AM in Urology Fareed Palomo MD)                             Order Questions     Question Answer Comment   Admitting Physician WINDY DUQUE    Level of Care Med Surg    Estimated length of stay More than 2 Midnights    Certification I certify that inpatient services are medically necessary for this patient for a duration of greater than two midnights  See H&P and MD Progress Notes for additional information about the patient's course of treatment  ED: Date/Time/Mode of Arrival:   ED Arrival Information     Expected Arrival Acuity Means of Arrival Escorted By Service Admission Type    - 7/7/2018 07:40 Urgent Ambulance [facility] General Medicine Urgent    Arrival Complaint    Fever          Chief Complaint:   Chief Complaint   Patient presents with    Weakness - Generalized     pt reports generalized weakness over the past few days, episodes of near syncope when standing up, +dry heaves, fever 101 1 for EMS       History of Illness: Hasmukh Stephen is a 80 y o  male history of hypertension, hyperlipidemia who presents with several days of not feeling well  Patient's symptoms started approximately Wednesday with nausea and dry heaves  Patient reports he has been unable to keep down any solid food due to this nausea  States he was trying to stay hydrated over the same period  He denies fevers, does endorse chills  He did have a fever recorded in route to the hospital today  No cough or cold-like symptoms to suggest URI  No obvious are clear dysuria  He has no loose stool or diarrhea  He has been more constipated than usual over the last month  No significant tick exposure  No sick contacts  ED Vital Signs:   ED Triage Vitals   Temperature Pulse Respirations Blood Pressure SpO2   07/07/18 0747 07/07/18 0752 07/07/18 0747 07/07/18 0747 07/07/18 0752   100 3 °F (37 9 °C) 76 20 136/63 96 %      Temp Source Heart Rate Source Patient Position - Orthostatic VS BP Location FiO2 (%)   07/07/18 0747 07/07/18 0752 07/07/18 0747 07/07/18 0747 --   Oral Monitor Sitting Right arm       Pain Score       07/07/18 0747       No Pain        Wt Readings from Last 1 Encounters:   07/07/18 89 4 kg (197 lb 1 5 oz)       Vital Signs (abnormal): TEMP 101 3   /93    Abnormal Labs/Diagnostic Test Results: 134 CA 7 6 AST 55 TOTAL PROTEIN 5 7 ALB 2 3 PLT 93 PROCALCITONIN 0 39 , U AMODERATE BLOOD   CXR No acute cardiopulmonary disease  CT ABDOMEN   Scattered colonic diverticulosis with no inflammatory changes present to suggest acute diverticulitis  No free air or free fluid  Questionable punctate 1 mm calcified calculus at the right UVJ causing no significant right-sided hydronephrosis or hydroureter  16mm intermediate density lesion noted at the left renal upper pole which may represent a hemorrhagic/proteinaceous cyst   However, solid neoplasm cannot be excluded  Therefore, a nonemergent enhanced CT or MRI of the kidneys is recommended for further   characterization    ED Treatment:   Medication Administration from 07/07/2018 0740 to 07/07/2018 1636       Date/Time Order Dose Route Action Action by Comments     07/07/2018 0754 ondansetron (ZOFRAN) 4 mg/2 mL injection **AcuDose Override Pull** 0   Given to EMS Garcia Zelaya RN      07/07/2018 5727 acetaminophen (TYLENOL) tablet 650 mg 650 mg Oral Given Garcia Zelaya RN      07/07/2018 1040 sodium chloride 0 9 % bolus 1,000 mL 0 mL Intravenous Stopped Garcia Zelaya RN      07/07/2018 0049 sodium chloride 0 9 % bolus 1,000 mL 1,000 mL Intravenous [address] [facility], [address]      07/07/2018 1514 aspirin chewable tablet 81 mg 81 mg Oral Given Garcia Zelaya RN      07/07/2018 1514 enoxaparin (LOVENOX) subcutaneous injection 40 mg 40 mg Subcutaneous Given Garcia Zelaya RN      07/07/2018 1540 acetaminophen (TYLENOL) tablet 650 mg 650 mg Oral Given Garcia Zelaya RN temp 101 3     07/07/2018 1612 ondansetron (ZOFRAN) injection 4 mg 4 mg Intravenous Given Garcia Zelaya RN      07/07/2018 1400 sodium chloride 0 9 % infusion 125 mL/hr Intravenous New Bag Garcia Zelaya RN           Past Medical/Surgical History: Active Ambulatory Problems     Diagnosis Date Noted    Atypical chest pain 06/05/2018    Essential hypertension 07/26/2017    Lipoma of axilla 06/05/2018    Mixed hyperlipidemia 06/05/2018    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    Elevated PSA 06/05/2018    Other microscopic hematuria 06/05/2018     Resolved Ambulatory Problems     Diagnosis Date Noted    No Resolved Ambulatory Problems     Past Medical History:   Diagnosis Date    BPH with obstruction/lower urinary tract symptoms     Cataract     Elevated PSA     Hypercholesterolemia     Hypertension     Poor urinary stream        Admitting Diagnosis: Fever [R50 9]  Generalized weakness [R53 1]    Age/Sex: 80 y o  male      ATTENDING  Cardiovascular: Regular rhythm  Tachycardia present  Skin: No rash noted   He is diaphoretic  Assessment/Plan: 7/8/18  * Generalized weakness   Assessment & Plan     Nausea with dry heaves since Wednesday  Overall very tired and weak  Several low grade temps overnight; wife reports "bug bite" ~ 2 weeks ago, but unclear if it was a tick or not  No obvious rash afterwards     Suspect tick-borne illness; ?lyme vs anaplasmosis  Procalcitonin: mildly elevated  Start doxycycline 100mg BID   Supportive care (IVF, APAP, antiemetics)  Parasite smear, anaplasma, lyme testing    Thrombocytopenia (HCC)   Assessment & Plan     Incidentally noted on labs; pt unaware of this issue  May be associated with possible tick-borne illness    Hyponatremia   Assessment & Plan     Mild  IVF   Stable, but no improvement      VTE Pharmacologic Prophylaxis:   Pharmacologic: Enoxaparin (Lovenox)  Mechanical VTE Prophylaxis in Place: Yes  Certification Statement: The patient, admitted on an observation basis, will now require > 2 midnight hospital stay due to persistent unexplained fevers, ongoing infectious disease work-up for tick-borne illness      Admission Orders:  GMF  MANUAL DIFF   BLOOD PARASITE SMEAR  ANAPLASMA PHAGOCYTOPHILUM PCR  LYME ANTIBODY PROFILE  Scheduled Meds:   Current Facility-Administered Medications:  acetaminophen 650 mg Oral Q6H PRN Mely Segovia MD    aspirin 81 mg Oral Daily Mely Segovia MD    atorvastatin 10 mg Oral HS Mely Segovia MD    calcium carbonate 1,000 mg Oral TID PRN Mely Segovia MD    doxycycline hyclate 100 mg Oral Q12H [address] Mely Segovia MD    enoxaparin 40 mg Subcutaneous Daily Mely Sgeovia MD    metoclopramide 10 mg Intravenous Q6H PRN Mely Segovia MD    ondansetron 4 mg Intravenous Q6H PRN Mely Segovia MD    senna-docusate sodium 1 tablet Oral HS Mely Segovia MD    sodium chloride 125 mL/hr Intravenous Continuous Mely Segovia MD Last Rate: 125 mL/hr (07/08/18 0009)     Continuous Infusions:   sodium chloride 125 mL/hr Last Rate: 125 mL/hr (07/08/18 0009)     PRN Meds:   acetaminophen    calcium carbonate    metoclopramide    ondansetron

## 2018-07-08 NOTE — ASSESSMENT & PLAN NOTE
Nausea with dry heaves since Wednesday  Overall very tired and weak  Several low grade temps overnight; wife reports "bug bite" ~ 2 weeks ago, but unclear if it was a tick or not  No obvious rash afterwards     Suspect tick-borne illness; ?lyme vs anaplasmosis  Procalcitonin: mildly elevated  Start doxycycline 100mg BID   Supportive care (IVF, APAP, antiemetics)  Parasite smear, anaplasma, lyme testing

## 2018-07-08 NOTE — PROGRESS NOTES
Progress Note - Candelario Reyes 1935, 80 y o  male MRN: [id number]    Unit/Bed#: -01 Encounter: [id number]    Primary Care Provider: Akin Ashton MD   Date and time admitted to hospital: 2018  7:40 AM    * Generalized weakness   Assessment & Plan    Nausea with dry heaves since Wednesday  Overall very tired and weak  Several low grade temps overnight; wife reports "bug bite" ~ 2 weeks ago, but unclear if it was a tick or not  No obvious rash afterwards  Suspect tick-borne illness; ?lyme vs anaplasmosis  Procalcitonin: mildly elevated  Start doxycycline 100mg BID   Supportive care (IVF, APAP, antiemetics)  Parasite smear, anaplasma, lyme testing         Thrombocytopenia ([address] )   Assessment & Plan    Incidentally noted on labs; pt unaware of this issue  May be associated with possible tick-borne illness         Hyponatremia   Assessment & Plan    Mild  IVF   Stable, but no improvement             VTE Pharmacologic Prophylaxis:   Pharmacologic: Enoxaparin (Lovenox)  Mechanical VTE Prophylaxis in Place: Yes    Patient Centered Rounds: I have performed bedside rounds with nursing staff today  Discussions with Specialists or Other Care Team Provider: none    Education and Discussions with Family / Patient: patient and family     Time Spent for Care: 30 minutes  More than 50% of total time spent on counseling and coordination of care as described above  Current Length of Stay: 0 day(s)    Current Patient Status: Inpatient   Certification Statement: The patient, admitted on an observation basis, will now require > 2 midnight hospital stay due to persistent unexplained fevers, ongoing infectious disease work-up for tick-borne illness     Discharge Plan / Estimated Discharge Date: TBD based on clinical course    Code Status: Level 1 - Full Code    Subjective:   Pt feels nauseated this am   Thinks he is about to have a fever  +LH/dizziness        Objective:     Vitals:   Temp (24hrs), Av 1 °F (37 3 °C), Min:97 5 °F (36 4 °C), Max:101 3 °F (38 5 °C)    HR:  [] 77  Resp:  [18-22] 18  BP: (109-175)/(58-93) 136/62  SpO2:  [94 %-98 %] 97 %  Body mass index is 29 11 kg/m²  Input and Output Summary (last 24 hours): Intake/Output Summary (Last 24 hours) at 07/08/18 0943  Last data filed at 07/08/18 0701   Gross per 24 hour   Intake             2000 ml   Output              700 ml   Net             1300 ml       Physical Exam:     Physical Exam   Constitutional: He is oriented to person, place, and time  No distress  Appears ill    HENT:   Head: Normocephalic and atraumatic  Cardiovascular: Regular rhythm  Tachycardia present  Exam reveals no friction rub  No murmur heard  Pulmonary/Chest: Effort normal and breath sounds normal  No respiratory distress  He has no wheezes  Abdominal: Soft  Bowel sounds are normal    Musculoskeletal: Normal range of motion  He exhibits no edema, tenderness or deformity  Neurological: He is alert and oriented to person, place, and time  Skin: No rash noted  He is diaphoretic  No erythema  Psychiatric: He has a normal mood and affect  His behavior is normal    Nursing note and vitals reviewed  Additional Data:     Labs:      Results from last 7 days  Lab Units 07/08/18  0503 07/07/18  0810   WBC Thousand/uL 5 23 6 39   HEMOGLOBIN g/dL 13 1 14 8   HEMATOCRIT % 39 3 44 0   PLATELETS Thousands/uL 93* 94*   NEUTROS PCT %  --  85*   LYMPHS PCT %  --  9*   LYMPHO PCT % 15  --    MONOS PCT %  --  6   MONO PCT MAN % 6  --    EOS PCT %  --  0   EOSINO PCT MANUAL % 0  --        Results from last 7 days  Lab Units 07/08/18  0439   SODIUM mmol/L 134*   POTASSIUM mmol/L 4 3   CHLORIDE mmol/L 104   CO2 mmol/L 24   BUN mg/dL 17   CREATININE mg/dL 1 04   CALCIUM mg/dL 7 6*   TOTAL PROTEIN g/dL 5 7*   BILIRUBIN TOTAL mg/dL 0 70   ALK PHOS U/L 60   ALT U/L 43   AST U/L 55*   GLUCOSE RANDOM mg/dL 110           * I Have Reviewed All Lab Data Listed Above    * Additional Pertinent Lab Tests Reviewed: All Labs Within Last 24 Hours Reviewed    Imaging:    Imaging Reports Reviewed Today Include:   Imaging Personally Reviewed by Myself Includes:      Recent Cultures (last 7 days):           Last 24 Hours Medication List:     Current Facility-Administered Medications:  acetaminophen 650 mg Oral Q6H PRN Lalito Crabtree MD    aspirin 81 mg Oral Daily Lalito Crabtree MD    atorvastatin 10 mg Oral HS Lalito Crabtree MD    calcium carbonate 1,000 mg Oral TID PRN Lalito Crabtree MD    doxycycline hyclate 100 mg Oral Q12H Flakita Saez MD    enoxaparin 40 mg Subcutaneous Daily Lalito Crabtree MD    metoclopramide 10 mg Intravenous Q6H PRN Lalito Crabtree MD    ondansetron 4 mg Intravenous Q6H PRN Lalito Crabtree MD    senna-docusate sodium 1 tablet Oral HS Lalito Crabtree MD    sodium chloride 125 mL/hr Intravenous Continuous Lalito Crabtree MD Last Rate: 125 mL/hr (07/08/18 0009)        Today, Patient Was Seen By: Lalito Crabtree MD    ** Please Note: This note has been constructed using a voice recognition system   **

## 2018-07-08 NOTE — ASSESSMENT & PLAN NOTE
Incidentally noted on labs; pt unaware of this issue  May be associated with possible tick-borne illness

## 2018-07-09 PROBLEM — K59.00 CONSTIPATION: Status: ACTIVE | Noted: 2018-07-09

## 2018-07-09 LAB
% PARASITEMIA: 0
ALBUMIN SERPL BCP-MCNC: 2.4 G/DL (ref 3.5–5)
ALP SERPL-CCNC: 67 U/L (ref 46–116)
ALT SERPL W P-5'-P-CCNC: 64 U/L (ref 12–78)
ANION GAP SERPL CALCULATED.3IONS-SCNC: 7 MMOL/L (ref 4–13)
AST SERPL W P-5'-P-CCNC: 68 U/L (ref 5–45)
ATRIAL RATE: 77 BPM
B BURGDOR IGG SER IA-ACNC: 0.09
B BURGDOR IGM SER IA-ACNC: 0.21
BASOPHILS # BLD MANUAL: 0 THOUSAND/UL (ref 0–0.1)
BASOPHILS NFR MAR MANUAL: 0 % (ref 0–1)
BILIRUB SERPL-MCNC: 0.6 MG/DL (ref 0.2–1)
BUN SERPL-MCNC: 13 MG/DL (ref 5–25)
CALCIUM SERPL-MCNC: 8 MG/DL (ref 8.3–10.1)
CHLORIDE SERPL-SCNC: 105 MMOL/L (ref 100–108)
CO2 SERPL-SCNC: 22 MMOL/L (ref 21–32)
CREAT SERPL-MCNC: 1.03 MG/DL (ref 0.6–1.3)
EOSINOPHIL # BLD MANUAL: 0 THOUSAND/UL (ref 0–0.4)
EOSINOPHIL NFR BLD MANUAL: 0 % (ref 0–6)
ERYTHROCYTE [DISTWIDTH] IN BLOOD BY AUTOMATED COUNT: 13 % (ref 11.6–15.1)
FOLATE SERPL-MCNC: >20 NG/ML (ref 3.1–17.5)
GFR SERPL CREATININE-BSD FRML MDRD: 67 ML/MIN/1.73SQ M
GLUCOSE SERPL-MCNC: 189 MG/DL (ref 65–140)
HCT VFR BLD AUTO: 38 % (ref 36.5–49.3)
HGB BLD-MCNC: 12.8 G/DL (ref 12–17)
LYMPHOCYTES # BLD AUTO: 0.91 THOUSAND/UL (ref 0.6–4.47)
LYMPHOCYTES # BLD AUTO: 17 % (ref 14–44)
MCH RBC QN AUTO: 31.4 PG (ref 26.8–34.3)
MCHC RBC AUTO-ENTMCNC: 33.7 G/DL (ref 31.4–37.4)
MCV RBC AUTO: 93 FL (ref 82–98)
MONOCYTES # BLD AUTO: 0.27 THOUSAND/UL (ref 0–1.22)
MONOCYTES NFR BLD: 5 % (ref 4–12)
NEUTROPHILS # BLD MANUAL: 3.98 THOUSAND/UL (ref 1.85–7.62)
NEUTS SEG NFR BLD AUTO: 74 % (ref 43–75)
P AXIS: 75 DEGREES
PARASITE BLD: NO
PATHOLOGIST INTERPRETATION: NORMAL
PLATELET # BLD AUTO: 103 THOUSANDS/UL (ref 149–390)
PLATELET BLD QL SMEAR: ABNORMAL
PMV BLD AUTO: 10.1 FL (ref 8.9–12.7)
POTASSIUM SERPL-SCNC: 3.6 MMOL/L (ref 3.5–5.3)
PR INTERVAL: 222 MS
PROT SERPL-MCNC: 5.9 G/DL (ref 6.4–8.2)
QRS AXIS: -25 DEGREES
QRSD INTERVAL: 128 MS
QT INTERVAL: 370 MS
QTC INTERVAL: 418 MS
RBC # BLD AUTO: 4.07 MILLION/UL (ref 3.88–5.62)
SODIUM SERPL-SCNC: 134 MMOL/L (ref 136–145)
T WAVE AXIS: 31 DEGREES
TOTAL CELLS COUNTED SPEC: 100
VARIANT LYMPHS # BLD AUTO: 4 %
VENTRICULAR RATE: 77 BPM
VIT B12 SERPL-MCNC: 751 PG/ML (ref 100–900)
WBC # BLD AUTO: 5.38 THOUSAND/UL (ref 4.31–10.16)

## 2018-07-09 PROCEDURE — 97116 GAIT TRAINING THERAPY: CPT

## 2018-07-09 PROCEDURE — 85007 BL SMEAR W/DIFF WBC COUNT: CPT | Performed by: HOSPITALIST

## 2018-07-09 PROCEDURE — 99232 SBSQ HOSP IP/OBS MODERATE 35: CPT | Performed by: HOSPITALIST

## 2018-07-09 PROCEDURE — G8978 MOBILITY CURRENT STATUS: HCPCS

## 2018-07-09 PROCEDURE — 80053 COMPREHEN METABOLIC PANEL: CPT | Performed by: HOSPITALIST

## 2018-07-09 PROCEDURE — 93010 ELECTROCARDIOGRAM REPORT: CPT | Performed by: INTERNAL MEDICINE

## 2018-07-09 PROCEDURE — 82607 VITAMIN B-12: CPT | Performed by: HOSPITALIST

## 2018-07-09 PROCEDURE — G8979 MOBILITY GOAL STATUS: HCPCS

## 2018-07-09 PROCEDURE — 82746 ASSAY OF FOLIC ACID SERUM: CPT | Performed by: HOSPITALIST

## 2018-07-09 PROCEDURE — 97163 PT EVAL HIGH COMPLEX 45 MIN: CPT

## 2018-07-09 PROCEDURE — 85027 COMPLETE CBC AUTOMATED: CPT | Performed by: HOSPITALIST

## 2018-07-09 RX ORDER — POLYETHYLENE GLYCOL 3350 17 G/17G
17 POWDER, FOR SOLUTION ORAL DAILY
Status: DISCONTINUED | OUTPATIENT
Start: 2018-07-09 | End: 2018-07-10 | Stop reason: HOSPADM

## 2018-07-09 RX ORDER — BISACODYL 10 MG
10 SUPPOSITORY, RECTAL RECTAL DAILY PRN
Status: DISCONTINUED | OUTPATIENT
Start: 2018-07-09 | End: 2018-07-10 | Stop reason: HOSPADM

## 2018-07-09 RX ADMIN — ATORVASTATIN CALCIUM 10 MG: 10 TABLET, FILM COATED ORAL at 21:28

## 2018-07-09 RX ADMIN — DOXYCYCLINE 100 MG: 100 CAPSULE ORAL at 21:27

## 2018-07-09 RX ADMIN — POLYETHYLENE GLYCOL 3350 17 G: 17 POWDER, FOR SOLUTION ORAL at 09:08

## 2018-07-09 RX ADMIN — ONDANSETRON 4 MG: 2 INJECTION INTRAMUSCULAR; INTRAVENOUS at 17:48

## 2018-07-09 RX ADMIN — Medication 1 TABLET: at 21:27

## 2018-07-09 RX ADMIN — ASPIRIN 81 MG 81 MG: 81 TABLET ORAL at 08:07

## 2018-07-09 RX ADMIN — DOXYCYCLINE 100 MG: 100 CAPSULE ORAL at 08:07

## 2018-07-09 NOTE — PROGRESS NOTES
Progress Note - Candelario Reyes 1935, 80 y o  male MRN: [id number]    Unit/Bed#: -01 Encounter: [id number]    Primary Care Provider: Akin Ashton MD   Date and time admitted to hospital: 7/7/2018  7:40 AM    * Generalized weakness   Assessment & Plan    Nausea with dry heaves since Wednesday  Overall very tired and weak  Several low grade temps overnight; wife reports "bug bite" ~ 2 weeks ago, but unclear if it was a tick or not  No obvious rash afterwards  Suspect tick-borne illness; ?lyme vs anaplasmosis  Procalcitonin: mildly elevated  Start doxycycline 100mg BID   Supportive care (IVF, APAP, antiemetics)  Parasite smear, anaplasma, lyme testing         Thrombocytopenia ([address] )   Assessment & Plan    Incidentally noted on labs; pt unaware of this issue  Improving   May be associated with possible tick-borne illness         Hyponatremia   Assessment & Plan    Mild  IVF   Stable        Constipation   Assessment & Plan    Will add miralax  Suppository if no bowel movement             VTE Pharmacologic Prophylaxis:   Pharmacologic: Enoxaparin (Lovenox)  Mechanical VTE Prophylaxis in Place: Yes    Patient Centered Rounds: I have performed bedside rounds with nursing staff today  Discussions with Specialists or Other Care Team Provider: none    Education and Discussions with Family / Patient: patient and wife     Time Spent for Care: 30 minutes  More than 50% of total time spent on counseling and coordination of care as described above  Current Length of Stay: 1 day(s)    Current Patient Status: Inpatient   Certification Statement: The patient will continue to require additional inpatient hospital stay due to awaiting tick-borne illness work-up; PT evalaution as pt fell at home     Discharge Plan / Estimated Discharge Date: TBD based on clinical course    Code Status: Level 1 - Full Code    Subjective:   Pt still very weak  C/o constipation  No f/c overnight        Objective:     Vitals:   Temp (24hrs), Av 5 °F (36 9 °C), Min:98 1 °F (36 7 °C), Max:98 8 °F (37 1 °C)    HR:  [67-70] 67  Resp:  [18] 18  BP: (130-140)/(62-65) 133/62  SpO2:  [95 %-96 %] 96 %  Body mass index is 29 11 kg/m²  Input and Output Summary (last 24 hours): Intake/Output Summary (Last 24 hours) at 18 1025  Last data filed at 18 0835   Gross per 24 hour   Intake             1340 ml   Output             3700 ml   Net            -2360 ml       Physical Exam:     Physical Exam   Constitutional: He is oriented to person, place, and time  No distress  HENT:   Head: Normocephalic and atraumatic  Cardiovascular: Normal rate and regular rhythm  Exam reveals no friction rub  No murmur heard  Pulmonary/Chest: Effort normal and breath sounds normal  No respiratory distress  He has no wheezes  Abdominal: Soft  He exhibits distension  There is no tenderness  Hypoactive    Musculoskeletal: He exhibits no edema or tenderness  Neurological: He is alert and oriented to person, place, and time  No cranial nerve deficit  Skin: Skin is warm and dry  No rash noted  He is not diaphoretic  No erythema  Psychiatric: He has a normal mood and affect  His behavior is normal    Nursing note and vitals reviewed  Additional Data:     Labs:      Results from last 7 days  Lab Units 18  0827  18  0810   WBC Thousand/uL 5 38  < > 6 39   HEMOGLOBIN g/dL 12 8  < > 14 8   HEMATOCRIT % 38 0  < > 44 0   PLATELETS Thousands/uL 103*  < > 94*   NEUTROS PCT %  --   --  85*   LYMPHS PCT %  --   --  9*   LYMPHO PCT % 17  < >  --    MONOS PCT %  --   --  6   MONO PCT MAN % 5  < >  --    EOS PCT %  --   --  0   EOSINO PCT MANUAL % 0  < >  --    < > = values in this interval not displayed      Results from last 7 days  Lab Units 18  0827   SODIUM mmol/L 134*   POTASSIUM mmol/L 3 6   CHLORIDE mmol/L 105   CO2 mmol/L 22   BUN mg/dL 13   CREATININE mg/dL 1 03   CALCIUM mg/dL 8 0*   TOTAL PROTEIN g/dL 5 9*   BILIRUBIN TOTAL mg/dL 0 60   ALK PHOS U/L 67   ALT U/L 64   AST U/L 68*   GLUCOSE RANDOM mg/dL 189*           * I Have Reviewed All Lab Data Listed Above  * Additional Pertinent Lab Tests Reviewed: All Labs Within Last 24 Hours Reviewed    Imaging:    Imaging Reports Reviewed Today Include:   Imaging Personally Reviewed by Myself Includes:      Recent Cultures (last 7 days):           Last 24 Hours Medication List:     Current Facility-Administered Medications:  acetaminophen 650 mg Oral Q6H PRN Lu Carney MD    aspirin 81 mg Oral Daily Lu Carney MD    atorvastatin 10 mg Oral HS Lu Carney MD    bisacodyl 10 mg Rectal Daily PRN Lu Carney MD    calcium carbonate 1,000 mg Oral TID PRN Lu Carney MD    doxycycline hyclate 100 mg Oral Q12H Celia Mathew MD    enoxaparin 40 mg Subcutaneous Daily Lu Carney MD    metoclopramide 10 mg Intravenous Q6H PRN Lu Carney MD    ondansetron 4 mg Intravenous Q6H PRN Lu Carney MD    polyethylene glycol 17 g Oral Daily Lu Carney MD    senna-docusate sodium 1 tablet Oral HS Lu Carney MD    sodium chloride 125 mL/hr Intravenous Continuous Lu Carney MD Last Rate: 125 mL/hr (07/08/18 8847)        Today, Patient Was Seen By: Lu Carney MD    ** Please Note: This note has been constructed using a voice recognition system   **

## 2018-07-09 NOTE — ASSESSMENT & PLAN NOTE
Incidentally noted on labs; pt unaware of this issue  Improving   May be associated with possible tick-borne illness

## 2018-07-09 NOTE — PLAN OF CARE
Problem: PHYSICAL THERAPY ADULT  Goal: Performs mobility at highest level of function for planned discharge setting  See evaluation for individualized goals  Treatment/Interventions: Functional transfer training, LE strengthening/ROM, Elevations, Patient/family training, Equipment eval/education, Bed mobility, Gait training, Cognitive reorientation, Endurance training, Therapeutic exercise, Spoke to nursing  Equipment Recommended: Gilberto Stark (trial)       See flowsheet documentation for full assessment, interventions and recommendations  Outcome: Progressing  Prognosis: Fair  Problem List: Decreased endurance, Impaired balance, Decreased mobility, Decreased coordination, Impaired vision (gait deviations)  Assessment: Pt requires less physical assistance to perform ambulation with cane, but needed more attempts to stand from chair  Called and spoke to Dr Magaly Larios re: findings  Barriers to Discharge: Inaccessible home environment, Decreased caregiver support     Recommendation: Home with family support, Outpatient PT          See flowsheet documentation for full assessment

## 2018-07-09 NOTE — PLAN OF CARE
Problem: PHYSICAL THERAPY ADULT  Goal: Performs mobility at highest level of function for planned discharge setting  See evaluation for individualized goals  Treatment/Interventions: Functional transfer training, LE strengthening/ROM, Elevations, Patient/family training, Equipment eval/education, Bed mobility, Gait training, Cognitive reorientation, Endurance training, Therapeutic exercise, Spoke to nursing  Equipment Recommended: [place]trial[place]       See flowsheet documentation for full assessment, interventions and recommendations  Prognosis: Fair  Problem List: Decreased endurance, Impaired balance, Decreased mobility, Decreased coordination, Impaired vision (gait deviations)  Assessment: Assessment: Pt is a 80 y o  male seen for PT evaluation s/p admit to [address] [place] [address] on 7/7/2018 w/ Generalized weakness  Order placed for PT  Prior to admission, pt was independent w/ all functional mobility w/ out device, lived in multi-level home and lived with spouse  Upon evaluation: Pt requires S assistance for transfers and intermittent min assistance for ambulation with out device  Pt's clinical presentation is currently unstable/unpredictable given the functional mobility deficits above, especially weakness, decreased endurance, gait deviations and decreased functional mobility tolerance, coupled with fall risks including hx of falls, impaired balance, impaired coordination and altered vision, and combined with medical complications of abnormal sodium values and abnormal platelets  Pt to benefit from continued skilled PT tx while in hospital and upon DC to address deficits as defined above and maximize level of functional mobility  From PT/mobility standpoint, recommendation at time of d/c would be OP PT, home with family support and with cane pending progress in order to maximize pt's functional independence and consistency w/ mobility in order to facilitate return to PLOF    Recommend trial with cane next 1-2 sessions, ther ex next 1-2 sessions, OT consult, case management consult and f/u from [facility] re: + [place]/ balance issues       Barriers to Discharge: Inaccessible home environment, Decreased caregiver support     Recommendation: Home with family support, Outpatient PT          See flowsheet documentation for full assessment

## 2018-07-09 NOTE — PHYSICAL THERAPY NOTE
PHYSICAL THERAPY EVALUATION  NAME:  Didi Clark  DATE: 07/09/18    AGE:   80 y o  Mrn:   [id number]  ADMIT DX:  Fever [R50 9]  Generalized weakness [R53 1]    Past Medical History:   Diagnosis Date    BPH with obstruction/lower urinary tract symptoms     Cataract     Elevated PSA     Hypercholesterolemia     Hypertension     Poor urinary stream      Length Of Stay: 1  Performed at least 2 patient identifiers during session: Name and Birthday    PHYSICAL THERAPY EVALUATION :    07/09/18 1323   Note Type   Note type Eval/Treat   Pain Assessment   Pain Assessment 0-10   Pain Score No Pain   Home Living   Type of [address] Multi-level  (bilevel; 0 from garage; 6+6 to get to bedroom from there  )   Home Equipment (none)   Prior Function   Level of [place] Independent with ADLs and functional mobility   Lives With Spouse   ADL Assistance Independent   IADLs Independent   Falls in the last 6 months 1 to 4  (fell while push mowing lawn "lightheaded", went to Doc  )   Comments PT does report hitting head   Restrictions/Precautions   Other Precautions Bed Alarm; Chair Alarm; Fall Risk   General   Additional Pertinent History Addressed as [place]   Family/Caregiver Present No   Cognition   Overall Cognitive Status WFL   Arousal/Participation Alert   Orientation Level Oriented X4   Memory Within functional limits   Following Commands Follows one step commands with increased time or repetition   RUE Strength   RUE Overall Strength Within Functional Limits - strength 5/5   LUE Strength   LUE Overall Strength Within Functional Limits - able to perform ADL tasks with strength   Strength RLE   R Hip Flexion 4+/5   R Knee Extension 4+/5   R Ankle Dorsiflexion 4+/5   Strength LLE   L Hip Flexion 4+/5   L Knee Extension 4+/5   L Ankle Dorsiflexion 4+/5   Coordination   Movements are Fluid and Coordinated 0   Sensation X  (limited vision, reports needing prism for L eye)   Light Touch   RLE Light Touch Grossly intact   LLE Light Touch Grossly intact   Transfers   Sit to Stand 5  Supervision  (l)   Additional items Assist x 1; Increased time required   Stand to Sit 5  Supervision   Additional items Assist x 1; Increased time required   Ambulation/Elevation   Gait pattern Ataxia  (increased sway noted)   Gait Assistance 4  Minimal assist  (for 25% of gait)   Additional items Assist x 1   Assistive Device None   Distance 250'   Balance   Static Sitting Good   Static Standing Fair -; Rhomberg : + 2x; single limb stance 4 seconds RLE, unable to perform without UE support LLE  Ambulatory Fair -   Endurance Deficit   Endurance Deficit Yes   Endurance Deficit Description limited amb distance    Activity Tolerance   Activity Tolerance Patient limited by fatigue   Medical Staff Made Aware spoke to case management   Nurse Made Aware spoke to Paul Kennedy RN   Assessment   Prognosis Fair   Problem List Decreased endurance; Impaired balance;Decreased mobility; Decreased coordination; Impaired vision  (gait deviations)   Barriers to Discharge Inaccessible home environment;Decreased caregiver support   Goals   Patient Goals to be able to go home, play golf soon   STG Expiration Date 07/16/18   Treatment Day 1  (treatment documented in note)   Plan   Treatment/Interventions Functional transfer training;LE strengthening/ROM; Elevations; Patient/family training;Equipment eval/education; Bed mobility;Gait training;Cognitive reorientation; Endurance training; Therapeutic exercise;Spoke to nursing   PT Frequency 5x/wk   Recommendation   Recommendation Home with family support; Outpatient PT   Equipment Recommended Cane  (trial)   Barthel Index   Feeding 10   Bathing 0   Grooming Score 5   Dressing Score 5   Bladder Score 10   Bowels Score 10   Toilet Use Score 5   Transfers (Bed/Chair) Score 10   Mobility (Level Surface) Score 10   Stairs Score 0   Barthel Index Score 65   (Please find full objective findings from PT assessment regarding body systems outlined above)  Assessment: Pt is a 80 y o  male seen for PT evaluation s/p admit to [address] [place] [address] on 7/7/2018 w/ Generalized weakness  Order placed for PT  Prior to admission, pt was independent w/ all functional mobility w/ out device, lived in multi-level home and lived with spouse  Upon evaluation: Pt requires S assistance for transfers and intermittent min assistance for ambulation with out device  Pt's clinical presentation is currently unstable/unpredictable given the functional mobility deficits above, especially weakness, decreased endurance, gait deviations and decreased functional mobility tolerance, coupled with fall risks including hx of falls, impaired balance, impaired coordination and altered vision, and combined with medical complications of abnormal sodium values and abnormal platelets  Pt to benefit from continued skilled PT tx while in hospital and upon DC to address deficits as defined above and maximize level of functional mobility  From PT/mobility standpoint, recommendation at time of d/c would be OP PT, home with family support and with cane pending progress in order to maximize pt's functional independence and consistency w/ mobility in order to facilitate return to PLOF  Recommend trial with cane next 1-2 sessions, ther ex next 1-2 sessions, OT consult, case management consult and f/u from [facility] re: + Rhomberg/ balance issues         Goals: Pt will: Perform bed mobility tasks to modified I to increase Indep in home environment  Perform transfers to consistent modified I to increase Indep in home environment and decrease risk for falls  Perform ambulation with least restrictive device (cane trial) for >300' to  modified I  to increase Indep in home environment and decrease risk for falls   Perform flight of steps w/S +/- cane to return to multilevel home and decrease risk for falls, perform TUG and DGI in entirety and achieve patient class cut off score to decrease risk for falls and improve activity tolerance     The following objective measures were performed on IE: Barthel Index 65/100   Comorbidities affecting pt's physical performance at time of assessment include: HTN, limited vision, orthopedic surgery and cataract extraction, RTC surgery  Personal factors affecting pt at time of IE include: multi-level environment, limited insight into impairments and recent fall(s)  Bhargav Baig PT, DPT    PHYSICAL THERAPY TREATMENT NOTE  Time In:13:39  Time Out:13:47  Total Time: 8min  S:  Pt agreeable to trial additional gait training and balance activities  O:  Gait training with cane for 250' with 10% min A  Transfers S but needed several attempts to complete  Attempted DGI, but pt with minimal to moderate impairments with out device via head turning and level gait  A:  Pt requires less physical assistance to perform ambulation with cane, but needed more attempts to stand from chair  Called and spoke to Dr Greyson Hensley re: findings  P:  Recommend addition of therapeutic exercises (balance) to current functional mobility program and instructed Pt to NOT perform balance HEP without staff present with him       Bhargav Baig PT, DPT

## 2018-07-10 VITALS
WEIGHT: 197.09 LBS | HEART RATE: 71 BPM | SYSTOLIC BLOOD PRESSURE: 123 MMHG | RESPIRATION RATE: 16 BRPM | DIASTOLIC BLOOD PRESSURE: 59 MMHG | OXYGEN SATURATION: 97 % | BODY MASS INDEX: 29.11 KG/M2 | TEMPERATURE: 98.3 F

## 2018-07-10 LAB
ALBUMIN SERPL BCP-MCNC: 2.3 G/DL (ref 3.5–5)
ALP SERPL-CCNC: 68 U/L (ref 46–116)
ALT SERPL W P-5'-P-CCNC: 127 U/L (ref 12–78)
ANION GAP SERPL CALCULATED.3IONS-SCNC: 7 MMOL/L (ref 4–13)
AST SERPL W P-5'-P-CCNC: 110 U/L (ref 5–45)
BASOPHILS # BLD AUTO: 0.02 THOUSANDS/ΜL (ref 0–0.1)
BASOPHILS NFR BLD AUTO: 0 % (ref 0–1)
BILIRUB SERPL-MCNC: 0.9 MG/DL (ref 0.2–1)
BUN SERPL-MCNC: 10 MG/DL (ref 5–25)
CALCIUM SERPL-MCNC: 7.6 MG/DL (ref 8.3–10.1)
CHLORIDE SERPL-SCNC: 104 MMOL/L (ref 100–108)
CO2 SERPL-SCNC: 25 MMOL/L (ref 21–32)
CREAT SERPL-MCNC: 1.04 MG/DL (ref 0.6–1.3)
EOSINOPHIL # BLD AUTO: 0.03 THOUSAND/ΜL (ref 0–0.61)
EOSINOPHIL NFR BLD AUTO: 0 % (ref 0–6)
ERYTHROCYTE [DISTWIDTH] IN BLOOD BY AUTOMATED COUNT: 13 % (ref 11.6–15.1)
GFR SERPL CREATININE-BSD FRML MDRD: 67 ML/MIN/1.73SQ M
GLUCOSE SERPL-MCNC: 109 MG/DL (ref 65–140)
HCT VFR BLD AUTO: 37.3 % (ref 36.5–49.3)
HGB BLD-MCNC: 12.8 G/DL (ref 12–17)
LYMPHOCYTES # BLD AUTO: 0.83 THOUSANDS/ΜL (ref 0.6–4.47)
LYMPHOCYTES NFR BLD AUTO: 12 % (ref 14–44)
MCH RBC QN AUTO: 31.3 PG (ref 26.8–34.3)
MCHC RBC AUTO-ENTMCNC: 34.3 G/DL (ref 31.4–37.4)
MCV RBC AUTO: 91 FL (ref 82–98)
MONOCYTES # BLD AUTO: 0.47 THOUSAND/ΜL (ref 0.17–1.22)
MONOCYTES NFR BLD AUTO: 7 % (ref 4–12)
NEUTROPHILS # BLD AUTO: 5.33 THOUSANDS/ΜL (ref 1.85–7.62)
NEUTS SEG NFR BLD AUTO: 80 % (ref 43–75)
PLATELET # BLD AUTO: 124 THOUSANDS/UL (ref 149–390)
PMV BLD AUTO: 10.2 FL (ref 8.9–12.7)
POTASSIUM SERPL-SCNC: 3.8 MMOL/L (ref 3.5–5.3)
PROT SERPL-MCNC: 5.9 G/DL (ref 6.4–8.2)
RBC # BLD AUTO: 4.09 MILLION/UL (ref 3.88–5.62)
SODIUM SERPL-SCNC: 136 MMOL/L (ref 136–145)
WBC # BLD AUTO: 6.68 THOUSAND/UL (ref 4.31–10.16)

## 2018-07-10 PROCEDURE — 85025 COMPLETE CBC W/AUTO DIFF WBC: CPT | Performed by: HOSPITALIST

## 2018-07-10 PROCEDURE — 80053 COMPREHEN METABOLIC PANEL: CPT | Performed by: HOSPITALIST

## 2018-07-10 PROCEDURE — 99239 HOSP IP/OBS DSCHRG MGMT >30: CPT | Performed by: INTERNAL MEDICINE

## 2018-07-10 RX ORDER — DOXYCYCLINE HYCLATE 100 MG/1
100 CAPSULE ORAL EVERY 12 HOURS SCHEDULED
Qty: 18 CAPSULE | Refills: 0 | Status: SHIPPED | OUTPATIENT
Start: 2018-07-10 | End: 2018-07-19

## 2018-07-10 RX ORDER — ONDANSETRON 4 MG/1
4 TABLET, ORALLY DISINTEGRATING ORAL EVERY 6 HOURS PRN
Qty: 20 TABLET | Refills: 0 | Status: SHIPPED | OUTPATIENT
Start: 2018-07-10 | End: 2022-07-28

## 2018-07-10 RX ADMIN — ASPIRIN 81 MG 81 MG: 81 TABLET ORAL at 08:14

## 2018-07-10 RX ADMIN — ENOXAPARIN SODIUM 40 MG: 100 INJECTION SUBCUTANEOUS at 08:14

## 2018-07-10 RX ADMIN — DOXYCYCLINE 100 MG: 100 CAPSULE ORAL at 08:14

## 2018-07-10 NOTE — PROGRESS NOTES
Pt  Resting comfortably in bed  Call bell within reach, Vitals stable and expresses no needs at this time  Will continue to monitor

## 2018-07-10 NOTE — DISCHARGE SUMMARY
Discharge Summary - [facility] Internal Medicine    Patient Information: Caesar Mcpherson 80 y o  male MRN: [id number]  Unit/Bed#: -01 Encounter: [id number]    Discharging Physician / Practitioner: Glo Ron MD  PCP: Castillo Reynoso MD  Admission Date: 7/7/2018  Discharge Date: 07/10/18    Disposition:     Home    Reason for Admission:  Generalized weakness and fatigue     Discharge Diagnoses:     Principal Problem:    Generalized weakness  Active Problems: Thrombocytopenia ([address] )    Hyponatremia    Constipation  Resolved Problems:    * No resolved hospital problems  *      Consultations During Hospital Stay:  · None    Procedures Performed:     Chest x-ray no active disease  CT abdomen and pelvis:  Scattered colonic diverticulosis with no inflammatory changes present to suggest acute diverticulitis      No free air or free fluid      Questionable punctate 1 mm calcified calculus at the right UVJ causing no significant right-sided hydronephrosis or hydroureter      16mm intermediate density lesion noted at the left renal upper pole which may represent a hemorrhagic/proteinaceous cyst   However, solid neoplasm cannot be excluded  Therefore, a nonemergent enhanced CT or MRI of the kidneys is recommended for further   characterization  Significant Findings / Test Results:     · As above    Incidental Findings:   · None    Test Results Pending at Discharge (will require follow up): · None     Outpatient Tests Requested:  · None    Complications:  None    Hospital Course:     Caesar Mcpherson is a 80 y o  male patient who originally presented to the hospital on 7/7/2018 due to generally not feeling  Patient was evaluated in the emergency department and reported that he was having fatigue and tiredness  Also reported nausea with dry heaves  He was unable to tolerate diet  He was subsequently evaluated in emergency department and admitted  He was treated with supportive measures    Family reported a bug bite  He was noted to have mild thrombocytopenia which a gradually improved  Because of symptomatology and reported history of bug bite, it was suspected that he may have had tick borne illness  Lyme serology negative, blood parasites also negative  Anaplasma serology pending at the time of dictation  Is also possible that he may have had a viral illness  He felt better after starting on doxycycline, hence being discharged on 10 day course of doxycycline  Lyme serology may be repeated in 1 week time  Condition at Discharge: good     Discharge Day Visit / Exam:     Subjective:  Anxious to go home  No events overnight  Vitals: Blood Pressure: 134/63 (07/10/18 0627)  Pulse: 77 (07/10/18 0627)  Temperature: 99 6 °F (37 6 °C) (07/10/18 0627)  Temp Source: Oral (07/10/18 5267)  Respirations: 18 (07/10/18 6815)  Weight - Scale: 89 4 kg (197 lb 1 5 oz) (07/07/18 1330)  SpO2: 96 % (07/10/18 0627)  Exam:   Physical Exam     Gen -Patient comfortable at rest  Neck- Supple  No thyromegaly or lymphadenopathy  Lungs-Clear bilaterally without any wheeze or rales   Heart S1-S2, regular rate and rhythm, no murmurs  Abdomen-soft nontender, no organomegaly  Bowel sounds present  Extremities-no cyanosi,  clubbing or edema  Skin- petechia rash on right side of the thigh      Discussion with Family:  Son at bedside updated    Discharge instructions/Information to patient and family:   See after visit summary for information provided to patient and family  Provisions for Follow-Up Care:  See after visit summary for information related to follow-up care and any pertinent home health orders  Planned Readmission: no     Discharge Statement:  I spent 35 minutes discharging the patient  This time was spent on the day of discharge  I had direct contact with the patient on the day of discharge   Greater than 50% of the total time was spent examining patient, answering all patient questions, arranging and discussing plan of care with patient as well as directly providing post-discharge instructions  Additional time then spent on discharge activities  Discharge Medications:  See after visit summary for reconciled discharge medications provided to patient and family        ** Please Note: This note has been constructed using a voice recognition system **

## 2018-07-11 LAB — A PHAGOCYTOPH DNA BLD QL NAA+PROBE: NEGATIVE

## 2019-06-14 ENCOUNTER — OFFICE VISIT (OUTPATIENT)
Dept: UROLOGY | Facility: MEDICAL CENTER | Age: 84
End: 2019-06-14
Payer: COMMERCIAL

## 2019-06-14 VITALS
DIASTOLIC BLOOD PRESSURE: 78 MMHG | WEIGHT: 180 LBS | SYSTOLIC BLOOD PRESSURE: 136 MMHG | BODY MASS INDEX: 26.66 KG/M2 | HEART RATE: 56 BPM | HEIGHT: 69 IN

## 2019-06-14 DIAGNOSIS — N40.1 BPH WITH OBSTRUCTION/LOWER URINARY TRACT SYMPTOMS: Primary | ICD-10-CM

## 2019-06-14 DIAGNOSIS — R97.20 ELEVATED PSA: ICD-10-CM

## 2019-06-14 DIAGNOSIS — N13.8 BPH WITH OBSTRUCTION/LOWER URINARY TRACT SYMPTOMS: Primary | ICD-10-CM

## 2019-06-14 LAB
SL AMB  POCT GLUCOSE, UA: NORMAL
SL AMB LEUKOCYTE ESTERASE,UA: NORMAL
SL AMB POCT BILIRUBIN,UA: NORMAL
SL AMB POCT BLOOD,UA: NORMAL
SL AMB POCT CLARITY,UA: CLEAR
SL AMB POCT COLOR,UA: YELLOW
SL AMB POCT KETONES,UA: NORMAL
SL AMB POCT NITRITE,UA: NORMAL
SL AMB POCT PH,UA: 6.5
SL AMB POCT SPECIFIC GRAVITY,UA: 1.01
SL AMB POCT URINE PROTEIN: NORMAL
SL AMB POCT UROBILINOGEN: 0.2

## 2019-06-14 PROCEDURE — 99214 OFFICE O/P EST MOD 30 MIN: CPT | Performed by: UROLOGY

## 2019-06-14 PROCEDURE — 81003 URINALYSIS AUTO W/O SCOPE: CPT | Performed by: UROLOGY

## 2019-06-14 RX ORDER — TAMSULOSIN HYDROCHLORIDE 0.4 MG/1
0.4 CAPSULE ORAL
Qty: 90 CAPSULE | Refills: 3 | Status: SHIPPED | OUTPATIENT
Start: 2019-06-14 | End: 2020-06-30 | Stop reason: SINTOL

## 2019-07-03 ENCOUNTER — TELEPHONE (OUTPATIENT)
Dept: UROLOGY | Facility: CLINIC | Age: 84
End: 2019-07-03

## 2020-06-30 ENCOUNTER — OFFICE VISIT (OUTPATIENT)
Dept: UROLOGY | Facility: MEDICAL CENTER | Age: 85
End: 2020-06-30
Payer: COMMERCIAL

## 2020-06-30 VITALS
DIASTOLIC BLOOD PRESSURE: 84 MMHG | BODY MASS INDEX: 27.4 KG/M2 | WEIGHT: 185 LBS | SYSTOLIC BLOOD PRESSURE: 158 MMHG | HEIGHT: 69 IN

## 2020-06-30 DIAGNOSIS — R97.20 ELEVATED PSA: ICD-10-CM

## 2020-06-30 DIAGNOSIS — N40.1 BPH WITH OBSTRUCTION/LOWER URINARY TRACT SYMPTOMS: Primary | ICD-10-CM

## 2020-06-30 DIAGNOSIS — N13.8 BPH WITH OBSTRUCTION/LOWER URINARY TRACT SYMPTOMS: Primary | ICD-10-CM

## 2020-06-30 LAB
SL AMB  POCT GLUCOSE, UA: NORMAL
SL AMB LEUKOCYTE ESTERASE,UA: NORMAL
SL AMB POCT BILIRUBIN,UA: NORMAL
SL AMB POCT BLOOD,UA: NORMAL
SL AMB POCT CLARITY,UA: CLEAR
SL AMB POCT COLOR,UA: YELLOW
SL AMB POCT KETONES,UA: NORMAL
SL AMB POCT NITRITE,UA: NORMAL
SL AMB POCT PH,UA: 7
SL AMB POCT SPECIFIC GRAVITY,UA: 1.01
SL AMB POCT URINE PROTEIN: NORMAL
SL AMB POCT UROBILINOGEN: 0.2

## 2020-06-30 PROCEDURE — 81003 URINALYSIS AUTO W/O SCOPE: CPT | Performed by: UROLOGY

## 2020-06-30 PROCEDURE — 99214 OFFICE O/P EST MOD 30 MIN: CPT | Performed by: UROLOGY

## 2020-06-30 RX ORDER — CYCLOSPORINE 0.5 MG/ML
EMULSION OPHTHALMIC
COMMUNITY
Start: 2020-06-11 | End: 2022-07-28 | Stop reason: SDUPTHER

## 2021-01-20 ENCOUNTER — IMMUNIZATIONS (OUTPATIENT)
Dept: FAMILY MEDICINE CLINIC | Facility: HOSPITAL | Age: 86
End: 2021-01-20

## 2021-01-20 DIAGNOSIS — Z23 ENCOUNTER FOR IMMUNIZATION: Primary | ICD-10-CM

## 2021-01-20 PROCEDURE — 0001A SARS-COV-2 / COVID-19 MRNA VACCINE (PFIZER-BIONTECH) 30 MCG: CPT

## 2021-01-20 PROCEDURE — 91300 SARS-COV-2 / COVID-19 MRNA VACCINE (PFIZER-BIONTECH) 30 MCG: CPT

## 2021-02-10 ENCOUNTER — IMMUNIZATIONS (OUTPATIENT)
Dept: FAMILY MEDICINE CLINIC | Facility: HOSPITAL | Age: 86
End: 2021-02-10

## 2021-02-10 DIAGNOSIS — Z23 ENCOUNTER FOR IMMUNIZATION: Primary | ICD-10-CM

## 2021-02-10 PROCEDURE — 91300 SARS-COV-2 / COVID-19 MRNA VACCINE (PFIZER-BIONTECH) 30 MCG: CPT

## 2021-02-10 PROCEDURE — 0002A SARS-COV-2 / COVID-19 MRNA VACCINE (PFIZER-BIONTECH) 30 MCG: CPT

## 2021-03-14 ENCOUNTER — HOSPITAL ENCOUNTER (OUTPATIENT)
Dept: MRI IMAGING | Facility: HOSPITAL | Age: 86
Discharge: HOME | End: 2021-03-14
Attending: OTOLARYNGOLOGY
Payer: COMMERCIAL

## 2021-03-14 DIAGNOSIS — IMO0001 ASYMMETRICAL HEARING LOSS OF RIGHT EAR: ICD-10-CM

## 2021-03-14 PROCEDURE — A9585 GADOBUTROL INJECTION: HCPCS | Performed by: OTOLARYNGOLOGY

## 2021-03-14 PROCEDURE — G1004 CDSM NDSC: HCPCS

## 2021-03-14 PROCEDURE — 70553 MRI BRAIN STEM W/O & W/DYE: CPT

## 2021-03-14 RX ADMIN — GADOBUTROL 8 ML: 604.72 INJECTION INTRAVENOUS at 10:17

## 2021-04-10 ENCOUNTER — HOSPITAL ENCOUNTER (OUTPATIENT)
Dept: CT IMAGING | Facility: HOSPITAL | Age: 86
Discharge: HOME | End: 2021-04-10
Attending: OTOLARYNGOLOGY
Payer: COMMERCIAL

## 2021-04-10 DIAGNOSIS — R42 DIZZINESS: ICD-10-CM

## 2021-04-10 DIAGNOSIS — R90.89 MRI OF BRAIN ABNORMAL: ICD-10-CM

## 2021-04-10 PROCEDURE — G1004 CDSM NDSC: HCPCS

## 2021-04-10 PROCEDURE — 70480 CT ORBIT/EAR/FOSSA W/O DYE: CPT

## 2021-04-19 ENCOUNTER — EVALUATION (OUTPATIENT)
Dept: PHYSICAL THERAPY | Facility: REHABILITATION | Age: 86
End: 2021-04-19
Payer: COMMERCIAL

## 2021-04-19 ENCOUNTER — TRANSCRIBE ORDERS (OUTPATIENT)
Dept: PHYSICAL THERAPY | Facility: REHABILITATION | Age: 86
End: 2021-04-19

## 2021-04-19 DIAGNOSIS — G89.29 CHRONIC RIGHT HIP PAIN: ICD-10-CM

## 2021-04-19 DIAGNOSIS — M25.551 CHRONIC RIGHT HIP PAIN: ICD-10-CM

## 2021-04-19 DIAGNOSIS — M54.31 RIGHT SIDED SCIATICA: Primary | ICD-10-CM

## 2021-04-19 DIAGNOSIS — M79.604 RIGHT LEG PAIN: ICD-10-CM

## 2021-04-19 PROCEDURE — 97161 PT EVAL LOW COMPLEX 20 MIN: CPT | Performed by: PHYSICAL THERAPIST

## 2021-04-19 PROCEDURE — 97110 THERAPEUTIC EXERCISES: CPT | Performed by: PHYSICAL THERAPIST

## 2021-04-19 NOTE — LETTER
2021    Swati Hayward MD  [address] [address]    Patient: Carmen Cade   YOB: 1935   Date of Visit: 2021     Encounter Diagnosis     ICD-10-CM    1  Right sided sciatica  M54 31    2  Right leg pain  M79 604    3  Chronic right hip pain  M25 551     G89 29        Dear Dr Lenore Healy:    Thank you for your recent referral of Carmen Cade  Please review the attached evaluation summary from Bay's recent visit  Please verify that you agree with the plan of care by signing the attached order  If you have any questions or concerns, please do not hesitate to call  I sincerely appreciate the opportunity to share in the care of one of your patients and hope to have another opportunity to work with you in the near future  Sincerely,    Clemente Pelaez, PT      Referring Provider:      I certify that I have read the below Plan of Care and certify the need for these services furnished under this plan of treatment while under my care  Swati Hayward MD  [address] [address]  Via Fax: [phone]          PT Evaluation     Today's date: 2021  Patient name: Carmen Cade  : 1935  MRN: [id number]  Referring provider: Lamar Kapadia MD  Dx:   Encounter Diagnosis     ICD-10-CM    1  Right sided sciatica  M54 31    2  Right leg pain  M79 604    3   Chronic right hip pain  M25 551     G89 29        Start Time: 1615  Stop Time: 1715  Total time in clinic (min): 60 minutes    Assessment  Assessment details: Carmen Cade is a 80y o  year old male who presents to IE with:   Right sided sciatica  (primary encounter diagnosis)  Right leg pain  Chronic right hip pain  Main impairments found this evaluation include: impaired BLE hip strength, tenderness to right piriformis, positive neural tension testing and restricted lumbar range of motion contributing to limitations in ADLs and overall quality of life  June Yadav presents with the impairments as listed above and would benefit from Physical Therapy to address these impairments and to maximize function  Impairments: abnormal or restricted ROM, impaired physical strength, lacks appropriate home exercise program, pain with function and poor posture   Barriers to therapy: Dizziness  Understanding of Dx/Px/POC: good   Prognosis: good    Goals  Short-Term Goals:   1  Patient will report 50% decrease in right leg discomfort in standing  2  Patient will be able to stand for at least 5 minutes without right leg discomfort for return to ADLs without limitation  Long-Term Goals:   1  Patient will demonstrate improvement in RLE strength by at least 1-2 grades  2  Patient will be able to walk for at least 10 minutes without limitation from right leg pain to walk around the block  3  Patient independent with HEP at time of discharge  Plan  Plan details: Thank you for opportunity to participate in the care of Hasmukh Stephen  Patient would benefit from: skilled physical therapy and PT eval  Planned modality interventions: TENS, unattended electrical stimulation and thermotherapy: hydrocollator packs  Planned therapy interventions: abdominal trunk stabilization, flexibility, home exercise program, therapeutic exercise, therapeutic activities, stretching, strengthening, postural training, patient education, neuromuscular re-education, massage, manual therapy and joint mobilization  Frequency: 1x week  Duration in visits: 10  Plan of Care beginning date: 4/19/2021  Treatment plan discussed with: patient        Subjective Evaluation    History of Present Illness  Mechanism of injury: Patient is an 80 y o  presenting to physical therapy with right leg pain and back/hip pain over the last few months being referred by Dr Brittaney Armenta  He was prescribed a Prednisone pack and is using a heating pad which has been helping with his pain   However, he continues to have a dull right leg pain down the back and front of his thigh  Functional impairments:  - 50-60 yards start feeling it in the leg  - Negotiates stairs sideways in the morning and uses handrails    N/T/Radicular pain: None, radicular pain down front and back of leg  Sensation changes: None  Bowel/Bladder changes: None  Previous Injury: None   Imaging: None    PT goals: Walk around the block and longer distance walking    Patient also reports he is in the process of being assessed for dizziness and balance  He is awaiting to hear from Dr Roz Castaneda regarding this issue and it may limit his ability to participate in formal PT going forward  Pain  Current pain ratin  At best pain ratin  At worst pain ratin  Quality: dull ache  Relieving factors: heat  Aggravating factors: walking and standing    Social Support  Stairs in house: yes   Lives with: spouse    Patient Goals  Patient goals for therapy: decreased pain, increased motion, improved balance, increased strength, return to sport/leisure activities and independence with ADLs/IADLs          Objective     Postural Observations  Seated posture: fair  Standing posture: fair        Tenderness     Left Hip   No tenderness in the ASIS and PSIS  Right Hip   No tenderness in the ASIS and PSIS       Active Range of Motion     Lumbar   Flexion:  Restriction level: minimal  Extension:  with pain Restriction level: moderate  Left lateral flexion:  WFL  Right lateral flexion:  with pain Restriction level: minimal    Strength/Myotome Testing     Left Hip   Planes of Motion   Flexion: 5  Abduction: 3    Right Hip   Planes of Motion   Flexion: 5  Abduction: 3    Left Knee   Flexion: 5  Extension: 5    Right Knee   Flexion: 5  Extension: 5    Left Ankle/Foot   Dorsiflexion: 5  Plantar flexion: 5  Inversion: 5  Eversion: 5    Right Ankle/Foot   Dorsiflexion: 5  Plantar flexion: 5  Inversion: 5  Eversion: 5    Muscle Activation     Additional Muscle Activation Details  Poor TA activation, improved with cues for diaphragmatic breathing    Tests     Lumbar     Left   Negative passive SLR  Right   Positive passive SLR  Left Hip   90/90 SLR: Positive  Right Hip   90/90 SLR: Positive                     Precautions HTN, cataracts, Dizziness    Daily Treatment Diary  * Indicates part of HEP      4/19          Manual           Right piriformis STM                                            Neuro Re-Ed           Diaphragmatic breathing > TA brace* 10                     PPT nv          PPT marches           Lumbar flexion stretch w ball rolls* :10x10                     Heel taps                      Ther Ex           Bike nv          Supine sciatic nerve glides           SKTC* :10x10          SLR flexion           SLR abduction           Bridging           Clamshells* 2x10                                           Ther Activity           Step ups           Lateral step ups           Sit to stands           Leg press                                                       Gait Training                                             Modalities           MHP                      * = on hep

## 2021-04-19 NOTE — PROGRESS NOTES
PT Evaluation     Today's date: 2021  Patient name: Caesar Mcpherson  : 1935  MRN: [id number]  Referring provider: Alissa Barnett MD  Dx:   Encounter Diagnosis     ICD-10-CM    1  Right sided sciatica  M54 31    2  Right leg pain  M79 604    3  Chronic right hip pain  M25 551     G89 29        Start Time: 1615  Stop Time: 1715  Total time in clinic (min): 60 minutes    Assessment  Assessment details: Caesar Mcpherson is a 80y o  year old male who presents to [facility] with:   Right sided sciatica  (primary encounter diagnosis)  Right leg pain  Chronic right hip pain  Main impairments found this evaluation include: impaired BLE hip strength, tenderness to right piriformis, positive neural tension testing and restricted lumbar range of motion contributing to limitations in ADLs and overall quality of life  Loan Forrest presents with the impairments as listed above and would benefit from Physical Therapy to address these impairments and to maximize function  Impairments: abnormal or restricted ROM, impaired physical strength, lacks appropriate home exercise program, pain with function and poor posture   Barriers to therapy: Dizziness  Understanding of Dx/Px/POC: good   Prognosis: good    Goals  Short-Term Goals:   1  Patient will report 50% decrease in right leg discomfort in standing  2  Patient will be able to stand for at least 5 minutes without right leg discomfort for return to ADLs without limitation  Long-Term Goals:   1  Patient will demonstrate improvement in RLE strength by at least 1-2 grades  2  Patient will be able to walk for at least 10 minutes without limitation from right leg pain to walk around the block  3  Patient independent with HEP at time of discharge  Plan  Plan details: Thank you for opportunity to participate in the care of Caesar Mcpherson      Patient would benefit from: skilled physical therapy and PT eval  Planned modality interventions: TENS, unattended electrical stimulation and thermotherapy: hydrocollator packs  Planned therapy interventions: abdominal trunk stabilization, flexibility, home exercise program, therapeutic exercise, therapeutic activities, stretching, strengthening, postural training, patient education, neuromuscular re-education, massage, manual therapy and joint mobilization  Frequency: 1x week  Duration in visits: 10  Plan of Care beginning date: 2021  Treatment plan discussed with: patient        Subjective Evaluation    History of Present Illness  Mechanism of injury: Patient is an 80 y o  presenting to physical therapy with right leg pain and back/hip pain over the last few months being referred by Dr Milind Cosme  He was prescribed a Prednisone pack and is using a heating pad which has been helping with his pain  However, he continues to have a dull right leg pain down the back and front of his thigh  Functional impairments:  - 50-60 yards start feeling it in the leg  - Negotiates stairs sideways in the morning and uses handrails    N/T/Radicular pain: None, radicular pain down front and back of leg  Sensation changes: None  Bowel/Bladder changes: None  Previous Injury: None   Imaging: None    PT goals: Walk around the block and longer distance walking    Patient also reports he is in the process of being assessed for dizziness and balance  He is awaiting to hear from Dr Triny Ken regarding this issue and it may limit his ability to participate in formal PT going forward        Pain  Current pain ratin  At best pain ratin  At worst pain ratin  Quality: dull ache  Relieving factors: heat  Aggravating factors: walking and standing    Social Support  Stairs in house: yes   Lives with: spouse    Patient Goals  Patient goals for therapy: decreased pain, increased motion, improved balance, increased strength, return to sport/leisure activities and independence with ADLs/IADLs          Objective     Postural Observations  Seated posture: fair  Standing posture: fair        Tenderness     Left Hip   No tenderness in the ASIS and PSIS  Right Hip   No tenderness in the ASIS and PSIS  Active Range of Motion     Lumbar   Flexion:  Restriction level: minimal  Extension:  with pain Restriction level: moderate  Left lateral flexion:  WFL  Right lateral flexion:  with pain Restriction level: minimal    Strength/Myotome Testing     Left Hip   Planes of Motion   Flexion: 5  Abduction: 3    Right Hip   Planes of Motion   Flexion: 5  Abduction: 3    Left Knee   Flexion: 5  Extension: 5    Right Knee   Flexion: 5  Extension: 5    Left Ankle/Foot   Dorsiflexion: 5  Plantar flexion: 5  Inversion: 5  Eversion: 5    Right Ankle/Foot   Dorsiflexion: 5  Plantar flexion: 5  Inversion: 5  Eversion: 5    Muscle Activation     Additional Muscle Activation Details  Poor TA activation, improved with cues for diaphragmatic breathing    Tests     Lumbar     Left   Negative passive SLR  Right   Positive passive SLR  Left Hip   90/90 SLR: Positive  Right Hip   90/90 SLR: Positive                     Precautions HTN, cataracts, Dizziness    Daily Treatment Diary  * Indicates part of HEP      4/19          Manual           Right piriformis STM                                            Neuro Re-Ed           Diaphragmatic breathing > TA brace* 10                     PPT nv          PPT marches           Lumbar flexion stretch w ball rolls* :10x10                     Heel taps                      Ther Ex           Bike nv          Supine sciatic nerve glides           SKTC* :10x10          SLR flexion           SLR abduction           Bridging           Clamshells* 2x10                                           Ther Activity           Step ups           Lateral step ups           Sit to stands           Leg press                                                       Gait Training                                             Modalities           [facility] * = on hep

## 2021-04-27 ENCOUNTER — OFFICE VISIT (OUTPATIENT)
Dept: PHYSICAL THERAPY | Facility: REHABILITATION | Age: 86
End: 2021-04-27
Payer: COMMERCIAL

## 2021-04-27 DIAGNOSIS — M25.551 CHRONIC RIGHT HIP PAIN: ICD-10-CM

## 2021-04-27 DIAGNOSIS — G89.29 CHRONIC RIGHT HIP PAIN: ICD-10-CM

## 2021-04-27 DIAGNOSIS — M79.604 RIGHT LEG PAIN: ICD-10-CM

## 2021-04-27 DIAGNOSIS — M54.31 RIGHT SIDED SCIATICA: Primary | ICD-10-CM

## 2021-04-27 PROCEDURE — 97110 THERAPEUTIC EXERCISES: CPT | Performed by: PHYSICAL THERAPIST

## 2021-04-27 PROCEDURE — 97112 NEUROMUSCULAR REEDUCATION: CPT | Performed by: PHYSICAL THERAPIST

## 2021-04-27 PROCEDURE — 97140 MANUAL THERAPY 1/> REGIONS: CPT | Performed by: PHYSICAL THERAPIST

## 2021-04-27 NOTE — PROGRESS NOTES
Daily Note     Today's date: 2021  Patient name: Mart Sicard  : 1935  MRN: [id number]  Referring provider: Kelvin Gallardo MD  Dx:   Encounter Diagnosis     ICD-10-CM    1  Right sided sciatica  M54 31    2  Right leg pain  M79 604    3  Chronic right hip pain  M25 551     G89 29        Start Time: 1445  Stop Time: 1530  Total time in clinic (min): 45 minutes     Subjective: Patient reports some improvements in his back and leg pain stating he was able to walk a longer distance than he was before  However, he does report discomfort towards the end of the walk  Objective: See treatment diary below      Assessment: Tolerated treatment well  Positive response to manual therapy this visit with improvement in muscular pain and tightness noted  Patient with some discomfort in the right hip following bike and difficulty with straight leg raise flexion due to increased strain and weakness  Modified to isometric hip flexion with fair tolerance  Patient reports improvement in symptoms at end of session  Patient demonstrated fatigue post treatment and would benefit from continued PT  Plan: Continue per plan of care  Precautions HTN, cataracts, Dizziness (slow transitions)    Daily Treatment Diary  * Indicates part of HEP               Manual           Right piriformis STM  8' sidelying                                          Neuro Re-Ed           Diaphragmatic breathing > TA brace* 10                     PPT nv w TAC 2x10         PPT marches           Lumbar flexion stretch w ball rolls* :10x10 :10x10                    Heel taps                      Ther Ex           Bike nv 5' lv 1         Supine sciatic nerve glides           SKTC* :10x10 :10x10         Isometric hip flexion  2x10x5"         SLR flexion  p!          SLR abduction           Bridging  2x10         Clamshells* 2x10 3x10                                           Ther Activity           Step ups           Lateral step ups           Sit to stands           Leg press                                                       Gait Training                                             Modalities           [facility]                      * = on hep

## 2021-05-12 ENCOUNTER — OFFICE VISIT (OUTPATIENT)
Dept: PHYSICAL THERAPY | Facility: REHABILITATION | Age: 86
End: 2021-05-12
Payer: COMMERCIAL

## 2021-05-12 DIAGNOSIS — M79.604 RIGHT LEG PAIN: ICD-10-CM

## 2021-05-12 DIAGNOSIS — M54.31 RIGHT SIDED SCIATICA: Primary | ICD-10-CM

## 2021-05-12 DIAGNOSIS — M25.551 CHRONIC RIGHT HIP PAIN: ICD-10-CM

## 2021-05-12 DIAGNOSIS — G89.29 CHRONIC RIGHT HIP PAIN: ICD-10-CM

## 2021-05-12 PROCEDURE — 97530 THERAPEUTIC ACTIVITIES: CPT | Performed by: PHYSICAL THERAPIST

## 2021-05-12 PROCEDURE — 97140 MANUAL THERAPY 1/> REGIONS: CPT | Performed by: PHYSICAL THERAPIST

## 2021-05-12 PROCEDURE — 97110 THERAPEUTIC EXERCISES: CPT | Performed by: PHYSICAL THERAPIST

## 2021-05-12 NOTE — PROGRESS NOTES
Daily Note     Today's date: 2021  Patient name: Mart Sicard  : 1935  MRN: [id number]  Referring provider: Kelvin Gallardo MD  Dx:   Encounter Diagnosis     ICD-10-CM    1  Right sided sciatica  M54 31    2  Right leg pain  M79 604    3  Chronic right hip pain  M25 551     G89 29        Start Time: 1015  Stop Time: 1103  Total time in clinic (min): 48 minutes    Subjective: Patient reports overall improvements in his RLE symptoms stating the pain in the back and down the leg has eased  He reports majority of discomfort in the posterior right hip with walking and static standing  Objective: See treatment diary below      Assessment: Tolerated treatment well  Positive response to manual therapy this visit with patient reporting improvement in hip discomfort  Progressed patient with increased repetitions with good tolerance  Most difficulty noted with isometric hip flexion due to fatigue  Initiated STS for additional functional strengthening with positive response from patient, educated on techniques to improve sit to stand transfers from lounge chair at home as patient reports occassional difficultly with this  Patient demonstrated fatigue post treatment, exhibited good technique with therapeutic exercises and would benefit from continued PT  Plan: Continue per plan of care             Precautions HTN, cataracts, Dizziness (slow transitions)    Daily Treatment Diary  * Indicates part of HEP              Manual           Right piriformis STM  8' sidelying 8' sidelying                                         Neuro Re-Ed           Diaphragmatic breathing > TA brace* 10                     PPT nv w TAC 2x10         PPT marches           Lumbar flexion stretch w ball rolls* :10x10 :10x10 :10x10                   Heel taps                      Ther Ex           Bike nv 5' lv 1 5' lv 1        Supine sciatic nerve glides           SKTC* :10x10 :10x10 :10x10         Isometric hip flexion  2x10x5" 2x10x5"         SLR flexion  p!          SLR abduction           Bridging  2x10 3x10        Clamshells* 2x10 3x10  OTB 2x10                                         Ther Activity           Step ups           Lateral step ups           Sit to stands   2x10        Leg press                                                       Gait Training                                             Modalities           [facility]                      * = on hep

## 2021-05-19 ENCOUNTER — OFFICE VISIT (OUTPATIENT)
Dept: PHYSICAL THERAPY | Facility: REHABILITATION | Age: 86
End: 2021-05-19
Payer: COMMERCIAL

## 2021-05-19 DIAGNOSIS — M25.551 CHRONIC RIGHT HIP PAIN: ICD-10-CM

## 2021-05-19 DIAGNOSIS — M79.604 RIGHT LEG PAIN: ICD-10-CM

## 2021-05-19 DIAGNOSIS — G89.29 CHRONIC RIGHT HIP PAIN: ICD-10-CM

## 2021-05-19 DIAGNOSIS — M54.31 RIGHT SIDED SCIATICA: Primary | ICD-10-CM

## 2021-05-19 PROCEDURE — 97110 THERAPEUTIC EXERCISES: CPT

## 2021-05-19 PROCEDURE — 97140 MANUAL THERAPY 1/> REGIONS: CPT

## 2021-05-19 PROCEDURE — 97530 THERAPEUTIC ACTIVITIES: CPT

## 2021-05-19 NOTE — PROGRESS NOTES
Daily Note     Today's date: 2021  Patient name: Reyes Mose  : 1935  MRN: [id number]  Referring provider: Deysi Roger MD  Dx:   Encounter Diagnosis     ICD-10-CM    1  Right sided sciatica  M54 31    2  Right leg pain  M79 604    3  Chronic right hip pain  M25 551     G89 29        Start Time: 1010  Stop Time: 1055  Total time in clinic (min): 45 minutes    Subjective: Patient reporting dizziness at start of session today  He reports hip as "pretty good" stating "it's more the dizziness now " He reports having difficulty with his left eye, reporting blurriness, "it feels like something is in it " He has an appointment with eye doctor next Wednesday  Objective: See treatment diary below      Assessment: Tolerated treatment well  Patient demonstrated fatigue post treatment, exhibited good technique with therapeutic exercises and would benefit from continued PT Tolerated all TE performed today well, no hip pain reported only reports of fatigue especially with clamshells  Trialed forward step ups with good tolerance, fingertips for balance required  Vitals taken at start of session 138/82 BP, 99% 02, 68 HR  Plan: Continue per plan of care  Progress treatment as tolerated  Precautions HTN, cataracts, Dizziness (slow transitions)    Daily Treatment Diary  * Indicates part of HEP             Manual           Right piriformis STM  8' sidelying 8' sidelying 8' sidelying                                        Neuro Re-Ed           Diaphragmatic breathing > TA brace* 10                     PPT nv w TAC 2x10         PPT marches           Lumbar flexion stretch w ball rolls* :10x10 :10x10 :10x10 :10x10                  Heel taps                      Ther Ex           Bike nv 5' lv 1 5' lv 1 5' lvl 1       Supine sciatic nerve glides           SKTC* :10x10 :10x10 :10x10  :10x10       Isometric hip flexion  2x10x5" 2x10x5"  2x10x5''       SLR flexion  p!          SLR Pt asking for lab orders - including HIV    abduction           Bridging  2x10 3x10 3x10        Clamshells* 2x10 3x10  OTB 2x10 OTB 3x10                                        Ther Activity           Step ups    4'' 2x10        Lateral step ups           Sit to stands   2x10 2x10       Leg press                                                       Gait Training                                             Modalities           [facility]                      * = on hep

## 2021-05-26 ENCOUNTER — APPOINTMENT (OUTPATIENT)
Dept: PHYSICAL THERAPY | Facility: REHABILITATION | Age: 86
End: 2021-05-26
Payer: COMMERCIAL

## 2021-06-02 ENCOUNTER — EVALUATION (OUTPATIENT)
Dept: PHYSICAL THERAPY | Facility: REHABILITATION | Age: 86
End: 2021-06-02
Payer: COMMERCIAL

## 2021-06-02 DIAGNOSIS — M25.551 CHRONIC RIGHT HIP PAIN: ICD-10-CM

## 2021-06-02 DIAGNOSIS — M54.31 RIGHT SIDED SCIATICA: Primary | ICD-10-CM

## 2021-06-02 DIAGNOSIS — G89.29 CHRONIC RIGHT HIP PAIN: ICD-10-CM

## 2021-06-02 DIAGNOSIS — M79.604 RIGHT LEG PAIN: ICD-10-CM

## 2021-06-02 PROCEDURE — 97530 THERAPEUTIC ACTIVITIES: CPT | Performed by: PHYSICAL THERAPIST

## 2021-06-02 PROCEDURE — 97110 THERAPEUTIC EXERCISES: CPT | Performed by: PHYSICAL THERAPIST

## 2021-06-02 PROCEDURE — 97140 MANUAL THERAPY 1/> REGIONS: CPT | Performed by: PHYSICAL THERAPIST

## 2021-06-02 NOTE — PROGRESS NOTES
PT Re-Evaluation  and PT Discharge    Today's date: 2021  Patient name: Silvana Rdz  : 1935  MRN: [id number]  Referring provider: Marjorie Ortiz MD  Dx:   Encounter Diagnosis     ICD-10-CM    1  Right sided sciatica  M54 31    2  Right leg pain  M79 604    3  Chronic right hip pain  M25 551     G89 29        Start Time: 934  Stop Time: 1022  Total time in clinic (min): 48 minutes    Assessment  Assessment details: Lavell Callahan has made the following improvements since beginning PT: decreased pain, increased ROM, increased strength and increased tolerance to activities  Lavell Callahan and PT mutually agree to transition to HEP at this time secondary to gains made in PT  He has been given updated HEP with verbalized understanding and compliance  Lavell Callahan is encouraged to contact PT with any questions or concerns in the future  Impairments: abnormal or restricted ROM, impaired physical strength, lacks appropriate home exercise program, pain with function and poor posture   Barriers to therapy: Dizziness  Understanding of Dx/Px/POC: good   Prognosis: good    Goals  Short-Term Goals:   1  Patient will report 50% decrease in right leg discomfort in standing  (met)  2  Patient will be able to stand for at least 5 minutes without right leg discomfort for return to ADLs without limitation  (met)    Long-Term Goals:   1  Patient will demonstrate improvement in RLE strength by at least 1-2 grades  (progressed, 3+5/ hip abd)  2  Patient will be able to walk for at least 10 minutes without limitation from right leg pain to walk around the block  (did not try, did increase walking distance compared to IE)  3  Patient independent with HEP at time of discharge  (met)      Plan  Plan details: Plan to discharge with comprehensive HEP for continued and maintained gains made in PT      Patient would benefit from: skilled physical therapy and PT eval  Planned modality interventions: TENS, unattended electrical stimulation and thermotherapy: hydrocollator packs  Planned therapy interventions: abdominal trunk stabilization, flexibility, home exercise program, therapeutic exercise, therapeutic activities, stretching, strengthening, postural training, patient education, neuromuscular re-education, massage, manual therapy and joint mobilization  Treatment plan discussed with: patient        Subjective Evaluation    History of Present Illness  Mechanism of injury: RE 6/2/2021:  Sade Estes has been seen for a total of 5 visits for OP PT for right leg, hip and back pain  Patient rates overall improvement since beginning PT 90%  Patient's global rating of change is " Quite a bit better (5) " Patient reports improvements with a decrease in pain and improved mobility  He reports walking about 100 yards without limitation from hip pain  Patient reports most difficulty with decreased endurance with walking but reports this is likely due to decrease endurance and limitations from continued dizziness rather than right hip pain  Patient has been compliant to HEP  Patient is an 80 y o  presenting to physical therapy with right leg pain and back/hip pain over the last few months being referred by Dr Street   He was prescribed a Prednisone pack and is using a heating pad which has been helping with his pain  However, he continues to have a dull right leg pain down the back and front of his thigh  Functional impairments:  - 50-60 yards start feeling it in the leg  - Negotiates stairs sideways in the morning and uses handrails    N/T/Radicular pain: None, radicular pain down front and back of leg  Sensation changes: None  Bowel/Bladder changes: None  Previous Injury: None   Imaging: None    PT goals: Walk around the block and longer distance walking    Patient also reports he is in the process of being assessed for dizziness and balance   He is awaiting to hear from Dr Estrella Burkitt regarding this issue and it may limit his ability to participate in formal PT going forward  Pain  Current pain ratin  At best pain ratin  At worst pain ratin  Quality: dull ache  Relieving factors: heat  Aggravating factors: walking and standing    Social Support  Stairs in house: yes   Lives with: spouse    Patient Goals  Patient goals for therapy: decreased pain, increased motion, improved balance, increased strength, return to sport/leisure activities and independence with ADLs/IADLs          Objective     Postural Observations  Seated posture: fair  Standing posture: fair        Palpation     Right   Tenderness of the piriformis  Tenderness     Left Hip   No tenderness in the ASIS and PSIS  Right Hip   No tenderness in the ASIS and PSIS  Active Range of Motion     Lumbar   Flexion:  Restriction level: minimal  Extension:  Restriction level: minimal  Left lateral flexion:  WFL  Right lateral flexion:  with pain Restriction level: minimal    Strength/Myotome Testing     Left Hip   Planes of Motion   Flexion: 5  Abduction: 3+    Right Hip   Planes of Motion   Flexion: 5  Abduction: 3+    Left Knee   Flexion: 5  Extension: 5    Right Knee   Flexion: 5  Extension: 5    Left Ankle/Foot   Dorsiflexion: 5  Plantar flexion: 5  Inversion: 5  Eversion: 5    Right Ankle/Foot   Dorsiflexion: 5  Plantar flexion: 5  Inversion: 5  Eversion: 5    Muscle Activation     Additional Muscle Activation Details  Poor TA activation, improved with cues for diaphragmatic breathing    Tests     Lumbar     Left   Negative passive SLR  Right   Negative passive SLR  Left Hip   90/90 SLR: Positive  Right Hip   90/90 SLR: Positive           Mid-Treatment (dizziness)  BP: 132/72 mm Hg  HR: 97 bpm  O2 saturation: 97%           Precautions HTN, cataracts, Dizziness (slow transitions)    Daily Treatment Diary  * Indicates part of HEP            Manual           Right piriformis STM  8' sidelying 8' sidelying 8' sidelying 8 sidelying Neuro Re-Ed           Diaphragmatic breathing > TA brace* 10                     PPT nv w TAC 2x10         PPT marches           Lumbar flexion stretch w ball rolls* :10x10 :10x10 :10x10 :10x10 :10x10                 Heel taps                      Ther Ex           Bike nv 5' lv 1 5' lv 1 5' lvl 1 5' lv 1      Supine sciatic nerve glides           SKTC* :10x10 :10x10 :10x10  :10x10 :10x10      Isometric hip flexion  2x10x5" 2x10x5"  2x10x5'' 2x10x5"      SLR flexion  p!          SLR abduction           Bridging  2x10 3x10 3x10  3x12      Clamshells* 2x10 3x10  OTB 2x10 OTB 3x10 OTB 3x12                                        Ther Activity           Step ups    4'' 2x10        Lateral step ups           Sit to stands   2x10 2x10 2x10      Leg press                                                       Gait Training                                             Modalities           [facility]                      * = on hep

## 2021-06-22 ENCOUNTER — EVALUATION (OUTPATIENT)
Dept: PHYSICAL THERAPY | Facility: REHABILITATION | Age: 86
End: 2021-06-22
Payer: COMMERCIAL

## 2021-06-22 DIAGNOSIS — R42 DIZZINESS: Primary | ICD-10-CM

## 2021-06-22 PROCEDURE — 97161 PT EVAL LOW COMPLEX 20 MIN: CPT | Performed by: PHYSICAL THERAPIST

## 2021-06-22 NOTE — PROGRESS NOTES
PT Evaluation     Today's date: 2021  Patient name: Cierra Garcia  : 1935  MRN: [id number]  Referring provider: Bart Osullivan MD  Dx:   Encounter Diagnosis     ICD-10-CM    1  Dizziness  R42 Ambulatory referral to Physical Therapy       Start Time:   Stop Time:   Total time in clinic (min): 65 minutes    Assessment  Assessment details: Cierra Garcia is a 80y o  year old male who presents with chronic dizziness  Current deficits include impaired static and dynamic balance and impaired gait  These deficits increase his risk for falls  Signs and symptoms are consistent with vestibular dysfunction and CT findings of superior canal dehiscence  Recommend skilled PT services to address previously stated deficits to promote progress towards PLOF  Impairments: abnormal gait, activity intolerance and impaired balance  Other impairment: vestibular dysfunction  Barriers to therapy: High insurance copay  Understanding of Dx/Px/POC: good   Prognosis: good    Goals  Functional goals:  1  Able to walk at least one mile without symptom provocation to promote progress towards PLOF  2  Increase DGI score to at least 19/24 to indicate decreased risk for falls  3  Able to safely ascend/descend 1 flight of stairs without LOB for improved household and community safety  4  Independent with HEP  Plan  Plan details: Thank you for referring Cierra Garcia to Physical Therapy at [place] [address] and for the opportunity to coordinate care      Patient would benefit from: skilled physical therapy  Planned therapy interventions: abdominal trunk stabilization, ADL retraining, balance, balance/weight bearing training, body mechanics training, breathing training, canalith repositioning, flexibility, functional ROM exercises, gait training, graded activity, graded exercise, graded motor, home exercise program, transfer training, therapeutic training, therapeutic exercise, therapeutic activities, stretching, strengthening, self care, postural training, patient education, neuromuscular re-education, muscle pump exercises, motor coordination training, manual therapy, joint mobilization and IADL retraining  Frequency: 1x week  Duration in weeks: 8  Plan of Care beginning date: 6/22/2021  Plan of Care expiration date: 8/17/2021  Treatment plan discussed with: patient        Subjective Evaluation    History of Present Illness  Mechanism of injury: Patient reports feeling off balance and dizzy which does not subside  Reports symptoms started about 6 month ago which has progressed since onset  Reports a constant feeling of fullness in the head and feels off balance when walking and turning  Notes difficulty going up/down stairs  Has an VNG test for next week  Also reports a constant feeling of something in his L eye but sometimes the feeling is more prominent and states the dizziness is worse with the feeling in his eye  Denies any room spinning dizziness and positional dizziness with sit <> supine and turning onto his sides to sleep  Pain  No pain reported    Social Support  Steps to enter house: yes  Stairs in house: yes   Lives in: multiple-level home  Lives with: spouse    Employment status: not working (retired)    Diagnostic Tests  MRI studies: abnormal ("No IAC or CP angle pathology  Suggestion of right semicircular canal dehiscence  Recommend temporal bone CT including dedicated SCC views for better assessment ")  CT scan: abnormal ("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, 2021")  Treatments  Current treatment: physical therapy  Patient Goals  Patient goal: "To be able to walk without losing my balance "        Objective     Concurrent Complaints  Positive for hearing loss (R side)  Negative for tinnitus, memory loss, aural fullness, poor concentration and peripheral neuropathyNausea: very infrequent, unsure if related to dizziness   Visual change: recent prism prescription change  Neuro Exam:     Dizziness  Positive for disequilibrium, light-headedness and rocking or swaying  Negative for vertigo, oscillopsia, motion sickness and floating or swimming  Double vision: some difficulty focusing, has prism lenses, has followed up with optomitrist recently  Exacerbating factors  Positive for walking and walking in busy environment  Negative for bending over, rolling in bed, looking up, turning head, supine to/from sitting and optokinetic movement       Oculomotor exam   Oculomotor ROM: WNL  Resting nystagmus: not present   Gaze holding nystagmus: not present left  and not present right  Smooth pursuits: within normal limits  Vertical saccades: hypometric  Horizontal saccades: normal  Convergence: normal  Head thrust: left normal and right normal    Positional testing   Positional testing comment: No symptom provocation with Valsalva maneuver    mCTSIB:  Phase 1: 60 seconds  Phase 2: 60 seconds  Phase 3: 60 seconds  Phase 4: 5 seconds    Multiple LOB during walking with horizontal head turns with Renée required to correct    FGA: 18/30 with score of 22/30 or less indicative of increased fall risk  DGI: 16/24 with score <19/24 predictive of increased fall risk in community dwelling adults               Precautions:  Fall risk, HTN, cataracts, dizziness    *indicates included in HEP     6/22            Neuro Re-Ed             Bike             VORx1 nv            VORx2             VOR cancellation nv            Saccades             Smooth pursuits             Convergence pencil push ups             NBOS balance             Tandem balance             SL balance             Tandem walking nv            Walking with head turns nv            Forward step ups nv                         Ther Exercise                                       Ther Activity

## 2021-06-29 ENCOUNTER — OFFICE VISIT (OUTPATIENT)
Dept: AUDIOLOGY | Age: 86
End: 2021-06-29
Payer: COMMERCIAL

## 2021-06-29 ENCOUNTER — APPOINTMENT (OUTPATIENT)
Dept: PHYSICAL THERAPY | Facility: REHABILITATION | Age: 86
End: 2021-06-29
Payer: COMMERCIAL

## 2021-06-29 DIAGNOSIS — R42 DIZZINESS: ICD-10-CM

## 2021-06-29 DIAGNOSIS — H83.8X1 SUPERIOR SEMICIRCULAR CANAL DEHISCENCE OF RIGHT EAR: ICD-10-CM

## 2021-06-29 PROCEDURE — 92567 TYMPANOMETRY: CPT | Performed by: AUDIOLOGIST

## 2021-06-29 PROCEDURE — 92540 BASIC VESTIBULAR EVALUATION: CPT | Performed by: AUDIOLOGIST

## 2021-06-29 PROCEDURE — 92537 CALORIC VSTBLR TEST W/REC: CPT | Performed by: AUDIOLOGIST

## 2021-06-29 NOTE — LETTER
Videonystagmography (VNG) Evaluation    Name:  Keren Smiley  :  1935  Age:  80 y o  Date of Evaluation: 21     History: Dizziness  Reason for visit: Keren Smiley is seen today at the request of Dr Caryle Chambers for an evaluation of balance  Patient complains of daily dizziness for the past 6 months  He reports the dizziness occasionally lasts all day, and other times comes and goes  He denies a spinning sensation, but reports feeling off-balance and wobbly  He feels as though he tends to veer to the right when walking  He reports vision and focus issues which he attributes to prior cataract surgery  He has a bilateral asymmetric hearing loss, right ear worse than left      Tympanometry:   Right: Type A - normal middle ear pressure and compliance   Left: Type A - normal middle ear pressure and compliance    Oculomotor battery:    Gaze:          Right: Normal        Left: Normal         Up: Normal        Down: Normal      Saccades: Borderline abnormal velocity     Tracking: Borderline abnormal gain 0 2 through 0 4Hz right cycle only     Optokinetic: Abnormal gain at 40dps left cycle only    Positioning/Positionals:     [employer]:    Right: Negative  for torsion               4 degrees Rightbeating nystagmus, 3 degrees Downbeating nystagmus with head down; few beats of very low SPV Leftbeating               nystagmus upon sitting up    Left: Negative  for torsion             4 degrees Leftbeating nystagmus, 8 degrees Downbeating nystagmus with head down; low SPV Leftbeating nystagmus upon sitting up      Positionals:     Sitting: Normal    Supine: Normal    Head Right: 4 degrees Downbeating nystagmus    Head Left: 2 degrees Leftbeating nystagmus, 4 degrees Downbeating nystagmus    Body Right: 3 degrees Rightbeating nystagmus, 7 degrees Downbeating nystagmus    Body Left: 5 degrees Leftbeating nystagmus, 3 degrees Downbeating nystagmus      Calorics:     Bithermal Caloric Irrigation: 17% right unilateral weakness within normal limits     Findings:  Non-localizing central                                           Recommendations:    Continue vestibular therapy   Follow-up with managing physician        Roberto Carlos Ramirez , CCC-A  Clinical Audiologist

## 2021-06-29 NOTE — PROGRESS NOTES
Videonystagmography (VNG) Evaluation    Name:  Carmen Cade  :  1935  Age:  80 y o  Date of Evaluation: 21     History: Dizziness  Reason for visit: Carmen Cade is seen today at the request of Dr Alix Adorno for an evaluation of balance  Patient complains of daily dizziness for the past 6 months  He reports the dizziness occasionally lasts all day, and other times comes and goes  He denies a spinning sensation, but reports feeling off-balance and wobbly  He feels as though he tends to veer to the right when walking  He reports vision and focus issues which he attributes to prior cataract surgery  He has a bilateral asymmetric hearing loss, right ear worse than left      Tympanometry:   Right: Type A - normal middle ear pressure and compliance   Left: Type A - normal middle ear pressure and compliance    Oculomotor battery:    Gaze:          Right: Normal        Left: Normal         Up: Normal        Down: Normal      Saccades: Borderline abnormal velocity     Tracking: Borderline abnormal gain 0 2 through 0 4Hz right cycle only     Optokinetic: Abnormal gain at 40dps left cycle only    Positioning/Positionals:     [employer]:    Right: Negative  for torsion               4 degrees Rightbeating nystagmus, 3 degrees Downbeating nystagmus with head down; few beats of very low SPV Leftbeating               nystagmus upon sitting up    Left: Negative  for torsion             4 degrees Leftbeating nystagmus, 8 degrees Downbeating nystagmus with head down; low SPV Leftbeating nystagmus upon sitting up      Positionals:     Sitting: Normal    Supine: Normal    Head Right: 4 degrees Downbeating nystagmus    Head Left: 2 degrees Leftbeating nystagmus, 4 degrees Downbeating nystagmus    Body Right: 3 degrees Rightbeating nystagmus, 7 degrees Downbeating nystagmus    Body Left: 5 degrees Leftbeating nystagmus, 3 degrees Downbeating nystagmus      Calorics:     Bithermal Caloric Irrigation: 17% right unilateral weakness within normal limits     Findings:  Non-localizing central                                           Recommendations:    Continue vestibular therapy   Follow-up with managing physician        Roberto Carlos Eric , CCC-A  Clinical Audiologist

## 2021-07-02 ENCOUNTER — OFFICE VISIT (OUTPATIENT)
Dept: AUDIOLOGY | Age: 86
End: 2021-07-02
Payer: COMMERCIAL

## 2021-07-02 DIAGNOSIS — R42 DIZZINESS: Primary | ICD-10-CM

## 2021-07-02 PROCEDURE — 92700 UNLISTED ORL SERVICE/PX: CPT | Performed by: AUDIOLOGIST

## 2021-07-02 NOTE — PROGRESS NOTES
Vestibular Evoked Myogenic Potentials (VEMP)    Name:  Adithya Suarez  :  1935  Age:  80 y o  Date of Evaluation: 21     History: Dizziness  Reason for visit: Adithya Suarez is being seen today at the request of Dr Claribel Montanez for cVEMP and oVEMP testing  Cervical Vestibular Evoked Myogenic Potential (cVEMP)  *Asymmetric contraction of the SCM was visualized during testing today- left greater then right  Can impact measurements on right side  The cVEMP was measured using a 500 Hz tone burst at 95 dB nHL via insert phones at a stimulus rate of 5 1 clicks/second  Stimulation of the right saccule and/or inferior portion of the vestibular nerve revealed normal latency and normal amplitude for the right ear  Recorded responses were absent at 70 dB nHL  Threshold obtained at 75 dB nHL  Stimulation of the left saccule and/or inferior portion of the vestibular nerve revealed normal latency and normal amplitude for the left ear  Recorded responses were absent at 70 dB nHL  Threshold obtained at 80 dB nHL  Average amplitude of right ear was 19 88 uv and average amplitude of left ear was 17 10 uv  Amplitude ratio between ears was 7 5% which is considered normal (>40% is abnormal)  Ocular Vestibular Evoked Myogenic Potential (oVEMP):   The oVEMP was measured using a 500 Hz tone burst at 95 dB nHL via insert phones at a stimulus rate of 5 1 clicks/second  Stimulation of the right utricle and/or superior portion of the vestibular nerve  revealed normal latency and normal amplitude for the right ear  Stimulation of the left utricle and/or superior portion of the vestibular nerve revealed normal latency and normal amplitude for the left ear  Average amplitude of right ear was 0 352 uv and average amplitude of left ear was 0 642 uv  Amplitude ratio between ears was 29% which is considered normal (>35% is abnormal)           Interpretation:  Essentially normal cVEMP - asymmetric contraction noted above  Normal oVEMP  RECOMMENDATIONS:  Consult ENT    PATIENT EDUCATION:   Discussed results and recommendations with the patient at length  Questions were addressed and the patient was encouraged to contact our department should concerns arise        Fiona Fofana , CCC-A  Clinical Audiologist

## 2021-07-06 ENCOUNTER — OFFICE VISIT (OUTPATIENT)
Dept: PHYSICAL THERAPY | Facility: REHABILITATION | Age: 86
End: 2021-07-06
Payer: COMMERCIAL

## 2021-07-06 DIAGNOSIS — R42 DIZZINESS: Primary | ICD-10-CM

## 2021-07-06 PROCEDURE — 97112 NEUROMUSCULAR REEDUCATION: CPT | Performed by: PHYSICAL THERAPIST

## 2021-07-06 NOTE — PROGRESS NOTES
Daily Note     Today's date: 2021  Patient name: Sandeep Pickens  : 1935  MRN: [id number]  Referring provider: Sandip Farr MD  Dx:   Encounter Diagnosis     ICD-10-CM    1  Dizziness  R42        Start Time:   Stop Time:   Total time in clinic (min): 41 minutes    Subjective: Reports his dizziness has been okay but today the dizziness is a little worse  Objective: See treatment diary below      Assessment: Tolerated treatment well  Significant challenge during walking with head turns, horizontal > vertical with occasional need for min-modA to correct LOB  Introduced VOR exercises with good tolerance and demonstrated good understanding; updated and reviewed HEP with patient; patient verbalized and demonstrated understanding of all exercises  Reported unsteadiness during forward step ups but no LOB demonstrated with handrail for assist  Patient demonstrated fatigue post treatment, exhibited good technique with therapeutic exercises and would benefit from continued PT  Plan: Continue per plan of care  Progress treatment as tolerated         Precautions:  Fall risk, HTN, cataracts, dizziness    *indicates included in HEP                Neuro Re-Ed             Bike             VORx1 nv :30x2 hor/fermin ea           VORx2             VOR cancellation nv :30x2 hor           Saccades             Smooth pursuits             Convergence pencil push ups             NBOS balance             Tandem balance             SL balance             Tandem walking nv 3 laps           Walking with head turns nv 3 laps hor/fermin ea           Forward step ups nv 2x10 ea                        Ther Exercise                                       Ther Activity

## 2021-07-13 ENCOUNTER — OFFICE VISIT (OUTPATIENT)
Dept: PHYSICAL THERAPY | Facility: REHABILITATION | Age: 86
End: 2021-07-13
Payer: COMMERCIAL

## 2021-07-13 DIAGNOSIS — R42 DIZZINESS: Primary | ICD-10-CM

## 2021-07-13 PROBLEM — H81.4 VERTIGO OF CENTRAL ORIGIN: Status: ACTIVE | Noted: 2021-07-13

## 2021-07-13 PROBLEM — H90.3 BILATERAL SENSORINEURAL HEARING LOSS: Status: ACTIVE | Noted: 2021-07-13

## 2021-07-13 PROCEDURE — 97112 NEUROMUSCULAR REEDUCATION: CPT | Performed by: PHYSICAL THERAPIST

## 2021-07-13 NOTE — PROGRESS NOTES
Daily Note     Today's date: 2021  Patient name: Rain Vivas  : 1935  MRN: [id number]  Referring provider: Alvarado Toure MD  Dx:   Encounter Diagnosis     ICD-10-CM    1  Dizziness  R42        Start Time: 930  Stop Time:   Total time in clinic (min): 48 minutes    Subjective: Reports feeling dizzy today; attributes increased feeling of dizziness to the weather  States he's following up with his eye doctor to correct his glasses  Objective: See treatment diary below      Assessment: Tolerated treatment well  Demonstrated improved dynamic balance during walking with head turns this session compared to previous session but continues to require CGA for safety; occasional corrective step required to minimize LOB  Challenged with weight shifting for [employer] LOS but noted improvement wit increased reps; initially required verbal and tactile cuing for proper weight shifting  Also challenged with NBOS balance on foam with EC secondary to reliance on vestibular system to maintain balance; required moderate assist at times to maintain balance but allowed moderate sway to allow patient time to self correct before therapist assisted  Patient demonstrated fatigue post treatment, exhibited good technique with therapeutic exercises and would benefit from continued PT  Plan: Continue per plan of care  Progress treatment as tolerated         Precautions:  Fall risk, HTN, cataracts, dizziness    *indicates included in HEP             Neuro Re-Ed           Bike           VORx1* nv :30x2 hor/fermin ea         VORx2           VOR cancellation* nv :30x2 hor         Saccades           Smooth pursuits           Convergence pencil push ups           NBOS balance   Foam EO :30x3, Foam EC :30x3        Tandem balance           SL balance           Tandem walking nv 3 laps 3 laps        Walking with head turns nv 3 laps hor/fermin ea 3 laps hor/fermin ea        Walking with marches   3 laps        Forward step ups nv 2x10 ea 2x10 ea, 1st set w/ HR use, 2nd set w/out HR use        Biodex LOS   5x                   Ther Exercise                                 Ther Activity

## 2021-07-20 ENCOUNTER — EVALUATION (OUTPATIENT)
Dept: PHYSICAL THERAPY | Facility: REHABILITATION | Age: 86
End: 2021-07-20
Payer: COMMERCIAL

## 2021-07-20 DIAGNOSIS — R42 DIZZINESS: Primary | ICD-10-CM

## 2021-07-20 PROCEDURE — 97112 NEUROMUSCULAR REEDUCATION: CPT | Performed by: PHYSICAL THERAPIST

## 2021-07-20 NOTE — PROGRESS NOTES
PT Re-Evaluation     Today's date: 2021  Patient name: Ramandeep Nunez  : 1935  MRN: [id number]  Referring provider: Stella Benites MD  Dx:   Encounter Diagnosis     ICD-10-CM    1  Dizziness  R42        Start Time: 930  Stop Time:   Total time in clinic (min): 64 minutes    Assessment  Assessment details: Per patient report and clinical findings, Ramandeep Nunez has made good progress since starting skilled physical therapy services  To this date, Ramandeep Nunez has completed 4 visits of skilled physical therapy  Noted improvements include improved static and dynamic balance and decreased risk for falls  Despite progress, remaining deficits include impaired static and dynamic balance, impaired safety while ambulating due to decreased balance, and impaired stair navigation  Demonstrates improvement in dynamic balance during walking with head turns compared to during initial evaluation however, continues to demonstrate multiple LOB with corrective step and CGA to maintain balance; would continue to benefit from skilled PT services to minimize risk for falls and decrease the risk for future injury and promote vestibular habituation  Recommend continued skilled physical therapy services to address remaining deficits to promote progress towards his prior level of function  Impairments: abnormal gait, activity intolerance and impaired balance  Other impairment: vestibular dysfunction  Barriers to therapy: High insurance copay  Understanding of Dx/Px/POC: good   Prognosis: good    Goals  Functional goals:  1  Able to walk at least one mile without symptom provocation to promote progress towards PLOF  - progressing  2  Increase DGI score to at least 19/24 to indicate decreased risk for falls  - met  3  Able to safely ascend/descend 1 flight of stairs without LOB for improved household and community safety  - progressing  4  Independent with HEP  - progressing    Plan  Plan details:  Thank you for referring Shelley Solitario to Physical Therapy at [place] [address] and for the opportunity to coordinate care  Patient would benefit from: skilled physical therapy  Planned therapy interventions: abdominal trunk stabilization, ADL retraining, balance, balance/weight bearing training, body mechanics training, breathing training, canalith repositioning, flexibility, functional ROM exercises, gait training, graded activity, graded exercise, graded motor, home exercise program, transfer training, therapeutic training, therapeutic exercise, therapeutic activities, stretching, strengthening, self care, postural training, patient education, neuromuscular re-education, muscle pump exercises, motor coordination training, manual therapy, joint mobilization and IADL retraining  Frequency: 1x week  Duration in weeks: 4  Plan of Care beginning date: 7/20/2021  Plan of Care expiration date: 8/17/2021  Treatment plan discussed with: patient        Subjective Evaluation    History of Present Illness  Mechanism of injury:   7/20/21:  Reports feeling he has made 50% progress since IE  Continues to have dizziness that varies from day to day  Feels that his knees and incorrect eye glasses prescription have an influence on his dizziness and balance  Reports he's supposed to be picking up his new glasses later today and his hopeful that will positively impact his balance  States he uses the handrail going up/down the stairs and goes slowly for safety  6/22/21:  Patient reports feeling off balance and dizzy which does not subside  Reports symptoms started about 6 month ago which has progressed since onset  Reports a constant feeling of fullness in the head and feels off balance when walking and turning  Notes difficulty going up/down stairs  Has an VNG test for next week  Also reports a constant feeling of something in his L eye but sometimes the feeling is more prominent and states the dizziness is worse with the feeling in his eye  Denies any room spinning dizziness and positional dizziness with sit <> supine and turning onto his sides to sleep  Pain  No pain reported    Social Support  Steps to enter house: yes  Stairs in house: yes   Lives in: multiple-level home  Lives with: spouse    Employment status: not working (retired)    Diagnostic Tests  MRI studies: abnormal ("No IAC or CP angle pathology  Suggestion of right semicircular canal dehiscence  Recommend temporal bone CT including dedicated SCC views for better assessment ")  CT scan: abnormal ("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, 2021")  Treatments  Current treatment: physical therapy  Patient Goals  Patient goal: "To be able to walk without losing my balance "        Objective     Concurrent Complaints  Positive for hearing loss (R side)  Negative for tinnitus, memory loss, aural fullness, poor concentration and peripheral neuropathyNausea: very infrequent, unsure if related to dizziness  Visual change: recent prism prescription change  Neuro Exam:     Dizziness  Positive for disequilibrium, light-headedness and rocking or swaying  Negative for vertigo, oscillopsia, motion sickness and floating or swimming  Double vision: some difficulty focusing, has prism lenses, has followed up with optomitrist recently  Exacerbating factors  Positive for walking and walking in busy environment  Negative for bending over, rolling in bed, looking up, turning head, supine to/from sitting and optokinetic movement       Oculomotor exam   Oculomotor ROM: WNL  Resting nystagmus: not present   Gaze holding nystagmus: not present left  and not present right  Smooth pursuits: within normal limits  Vertical saccades: hypometric  Horizontal saccades: normal  Convergence: normal  Head thrust: left normal and right normal    Positional testing   Positional testing comment: 7/20/21:    mCTSIB:  Phase 1: 60 seconds  Phase 2: 60 seconds  Phase 3: 60 seconds  Phase 4: 20 seconds    Occasional LOB during walking with head turns with CGA required to correct  Requires HHA from therapist for walking with NBOS for balance and safety      FGA: 22/30 with score of 22/30 or less indicative of increased fall risk  DGI: 19/24 with score <19/24 predictive of increased fall risk in community dwelling adults    6/22/21:  No symptom provocation with Valsalva maneuver    mCTSIB:  Phase 1: 60 seconds  Phase 2: 60 seconds  Phase 3: 60 seconds  Phase 4: 5 seconds    Multiple LOB during walking with horizontal head turns with Renée required to correct    FGA: 18/30 with score of 22/30 or less indicative of increased fall risk  DGI: 16/24 with score <19/24 predictive of increased fall risk in community dwelling adults          Flowsheet Rows      Most Recent Value   PT/OT G-Codes   Current Score  67   Projected Score  72           Precautions:  Fall risk, HTN, cataracts, dizziness    *indicates included in HEP     6/22 7/6 7/13 7/20       Neuro Re-Ed           Bike           VORx1* nv :30x2 hor/fermin ea         VORx2           VOR cancellation* nv :30x2 hor         Saccades           Smooth pursuits           Convergence pencil push ups           NBOS balance   Foam EO :30x3, Foam EC :30x3 Floor EC :60x1, Foam EO :60x1, Foam EC :30x1       Tandem balance           SL balance           Tandem walking nv 3 laps 3 laps 3 laps HHAx1       Walking with head turns nv 3 laps hor/fermin ea 3 laps hor/fermin ea 3 laps hor/fermin [employer] with marches   3 laps 3 laps       Forward step ups nv 2x10 ea 2x10 ea, 1st set w/ HR use, 2nd set w/out HR use 1x10 ea up/over w/ out handrail use 8" step       Biodex LOS   5x 6x       Forward walking EC/backward walking EO    2 laps                             Ther Exercise                                 Ther Activity

## 2021-07-27 ENCOUNTER — OFFICE VISIT (OUTPATIENT)
Dept: PHYSICAL THERAPY | Facility: REHABILITATION | Age: 86
End: 2021-07-27
Payer: COMMERCIAL

## 2021-07-27 DIAGNOSIS — R42 DIZZINESS: Primary | ICD-10-CM

## 2021-07-27 PROCEDURE — 97112 NEUROMUSCULAR REEDUCATION: CPT | Performed by: PHYSICAL THERAPIST

## 2021-07-27 NOTE — PROGRESS NOTES
Daily Note     Today's date: 2021  Patient name: Elizabeth Anaya  : 1935  MRN: [id number]  Referring provider: Madeline Gaytan MD  Dx:   Encounter Diagnosis     ICD-10-CM    1  Dizziness  R42        Start Time:   Stop Time:   Total time in clinic (min): 40 minutes    Subjective: States  was a great day, had some dizziness yesterday afternoon, was unable to sleep well last night, and is having more dizziness today  Also states his L eye is bothering him today  Reports he picked up his new glasses about a week ago  Objective: See treatment diary below       Assessment: Tolerated treatment well  Demonstrates improving dynamic balance during walking with head turns but demonstrates increased frequency of corrective steps necessary to maintain balance during walking with vertical head turns  Challenged with NBOS balance with EC on foam with mod assist required from therapist to maintain balance  Patient demonstrated appropriate level of challenge and muscular fatigue throughout session and noted good status at end of visit  Patient demonstrated fatigue post treatment, exhibited good technique with therapeutic exercises and would benefit from continued PT  Plan: Continue per plan of care  Progress treatment as tolerated         Precautions:  Fall risk, HTN, cataracts, dizziness    *indicates included in HEP           Neuro Re-Ed           Bike           VORx1* nv :30x2 hor/fermin ea         VORx2           VOR cancellation* nv :30x2 hor         Saccades           Smooth pursuits           Convergence pencil push ups           NBOS balance   Foam EO :30x3, Foam EC :30x3 Floor EC :60x1, Foam EO :60x1, Foam EC :30x1 Foam EO :60x2, foam EC :10x6      Tandem balance           SL balance           Tandem walking nv 3 laps 3 laps 3 laps HHAx1 4 laps HHAx1      Walking with head turns nv 3 laps hor/fermin ea 3 laps hor/fermin ea 3 laps hor/fermin ea 3 laps hor/fermin [employer] with marches   3 laps 3 laps 3 laps      Forward step ups nv 2x10 ea 2x10 ea, 1st set w/ HR use, 2nd set w/out HR use 1x10 ea up/over w/ out handrail use 8" step       Biode[employer] LOS   5x 6x 6x      Forward walking EC/backward walking EO    2 laps                             Ther Exercise                                 Ther Activity

## 2021-08-03 ENCOUNTER — OFFICE VISIT (OUTPATIENT)
Dept: PHYSICAL THERAPY | Facility: REHABILITATION | Age: 86
End: 2021-08-03
Payer: COMMERCIAL

## 2021-08-03 DIAGNOSIS — R42 DIZZINESS: Primary | ICD-10-CM

## 2021-08-03 PROCEDURE — 97112 NEUROMUSCULAR REEDUCATION: CPT | Performed by: PHYSICAL THERAPIST

## 2021-08-03 NOTE — PROGRESS NOTES
Daily Note     Today's date: 8/3/2021  Patient name: Sawyer Sweeney  : 1935  MRN: [id number]  Referring provider: Kasandra Winston MD  Dx:   Encounter Diagnosis     ICD-10-CM    1  Dizziness  R42        Start Time:   Stop Time:   Total time in clinic (min): 46 minutes    Subjective: Reports he had a lot of dizziness on  until later in the day which he attributes to the rain but yesterday was a good day  Objective: See treatment diary below      Assessment: Tolerated treatment well  Occasional LOB demonstrated during session with patient able to self correct with use of countertop and stepping reaction  Discussed safe performance of HEP with utilization of countertop/sturdy table and having another person to guard while performing balance based exercises to minimize risk for falls; patient verbalized understanding  No reports of symptom provocation throughout session and noted good status at end of visit  Patient demonstrated fatigue post treatment, exhibited good technique with therapeutic exercises and would benefit from continued PT  Plan: POC will be placed on hold unitl POC expires  Updated and reviewed HEP with patient; patient verbalized and demonstrated understanding of all exercises  Will follow up with patient as appropriate          Precautions:  Fall risk, HTN, cataracts, dizziness    *indicates included in HEP     6/22 7/6 7/13 7/20 7/27 8/3     Neuro Re-Ed           Bike           VORx1* nv :30x2 hor/fermin ea         VORx2           VOR cancellation* nv :30x2 hor         Saccades           Smooth pursuits           Convergence pencil push ups           NBOS balance*   Foam EO :30x3, Foam EC :30x3 Floor EC :60x1, Foam EO :60x1, Foam EC :30x1 Foam EO :60x2, foam EC :10x6      Tandem balance*      :30x4 ea at countertop     SL balance           Tandem walking* nv 3 laps 3 laps 3 laps HHAx1 4 laps HHAx1 5 laps at countertop     Standing with head turns*      :30x4 hor/vert ea at countertop     Walking with head turns nv 3 laps hor/fermin ea 3 laps hor/fermin ea 3 laps hor/fermin ea 3 laps hor/fermin ea      Walking with [employer]	[place]*   3 laps 3 laps 3 laps 8 laps at [employer] step ups nv 2x10 ea 2x10 ea, 1st set w/ HR use, 2nd set w/out HR use 1x10 ea up/over w/ out handrail use 8" step       Biodex LOS   5x 6x 6x      Forward walking EC/backward walking EO    2 laps                             Ther Exercise                                 Ther Activity

## 2021-08-24 NOTE — PROGRESS NOTES
PT Discharge    Today's date: 2021  Patient name: Ramandeep Nunez  : 1935  MRN: [id number]  Referring provider: Stella Benites MD  Dx:   Encounter Diagnosis     ICD-10-CM    1  Dizziness  R42        Start Time:   Stop Time:   Total time in clinic (min): 46 minutes    Assessment/Plan     Ramandeep Nunez has not returned since last appointment; unable to perform objective measures since then  Spoke with patient over the phone to follow up since previous session with patient continuing to report varying levels of dizziness depending on the day but overall, doing well  Reports good compliance with HEP  Answered all question prior to ending phone call and encouraged patient to call with any future questions  At this time, Ramandeep Nunez will be discharged from skilled physical therapy         Subjective    Objective

## 2021-09-02 ENCOUNTER — HOSPITAL ENCOUNTER (OUTPATIENT)
Facility: HOSPITAL | Age: 86
Setting detail: OBSERVATION
Discharge: HOME | End: 2021-09-03
Attending: EMERGENCY MEDICINE | Admitting: INTERNAL MEDICINE
Payer: COMMERCIAL

## 2021-09-02 ENCOUNTER — APPOINTMENT (OUTPATIENT)
Dept: MRI IMAGING | Facility: HOSPITAL | Age: 86
End: 2021-09-02
Payer: COMMERCIAL

## 2021-09-02 ENCOUNTER — APPOINTMENT (EMERGENCY)
Dept: CT IMAGING | Facility: HOSPITAL | Age: 86
End: 2021-09-02
Payer: COMMERCIAL

## 2021-09-02 ENCOUNTER — APPOINTMENT (OUTPATIENT)
Dept: NON INVASIVE DIAGNOSTICS | Facility: HOSPITAL | Age: 86
End: 2021-09-02
Payer: COMMERCIAL

## 2021-09-02 ENCOUNTER — APPOINTMENT (EMERGENCY)
Dept: RADIOLOGY | Facility: HOSPITAL | Age: 86
End: 2021-09-02
Payer: COMMERCIAL

## 2021-09-02 DIAGNOSIS — I65.02 STENOSIS OF LEFT VERTEBRAL ARTERY: ICD-10-CM

## 2021-09-02 DIAGNOSIS — R42 DIZZINESS: Primary | ICD-10-CM

## 2021-09-02 DIAGNOSIS — I65.29 CAROTID STENOSIS: ICD-10-CM

## 2021-09-02 DIAGNOSIS — R79.89 ELEVATED TSH: ICD-10-CM

## 2021-09-02 PROBLEM — I65.09 VERTEBRAL ARTERY STENOSIS: Status: ACTIVE | Noted: 2021-09-02

## 2021-09-02 LAB
ALBUMIN SERPL BCP-MCNC: 3.7 G/DL (ref 3.5–5)
ALP SERPL-CCNC: 75 U/L (ref 46–116)
ALT SERPL W P-5'-P-CCNC: 38 U/L (ref 12–78)
ANION GAP SERPL CALCULATED.3IONS-SCNC: 10 MMOL/L (ref 4–13)
ANION GAP SERPL CALCULATED.3IONS-SCNC: 9 MMOL/L (ref 4–13)
APTT PPP: 30 SECONDS (ref 23–37)
AST SERPL W P-5'-P-CCNC: 34 U/L (ref 5–45)
ATRIAL RATE: 69 BPM
BACTERIA UR QL AUTO: NORMAL /HPF
BASOPHILS # BLD AUTO: 0.04 THOUSANDS/ΜL (ref 0–0.1)
BASOPHILS NFR BLD AUTO: 1 % (ref 0–1)
BILIRUB SERPL-MCNC: 0.93 MG/DL (ref 0.2–1)
BILIRUB UR QL STRIP: NEGATIVE
BUN SERPL-MCNC: 13 MG/DL (ref 5–25)
BUN SERPL-MCNC: 15 MG/DL (ref 5–25)
CALCIUM SERPL-MCNC: 9.1 MG/DL (ref 8.3–10.1)
CALCIUM SERPL-MCNC: 9.3 MG/DL (ref 8.3–10.1)
CHLORIDE SERPL-SCNC: 103 MMOL/L (ref 100–108)
CHLORIDE SERPL-SCNC: 105 MMOL/L (ref 100–108)
CHOLEST SERPL-MCNC: 123 MG/DL (ref 50–200)
CLARITY UR: CLEAR
CO2 SERPL-SCNC: 24 MMOL/L (ref 21–32)
CO2 SERPL-SCNC: 26 MMOL/L (ref 21–32)
COLOR UR: YELLOW
CREAT SERPL-MCNC: 1.19 MG/DL (ref 0.6–1.3)
CREAT SERPL-MCNC: 1.27 MG/DL (ref 0.6–1.3)
EOSINOPHIL # BLD AUTO: 0.16 THOUSAND/ΜL (ref 0–0.61)
EOSINOPHIL NFR BLD AUTO: 2 % (ref 0–6)
ERYTHROCYTE [DISTWIDTH] IN BLOOD BY AUTOMATED COUNT: 12.8 % (ref 11.6–15.1)
ERYTHROCYTE [DISTWIDTH] IN BLOOD BY AUTOMATED COUNT: 12.9 % (ref 11.6–15.1)
EST. AVERAGE GLUCOSE BLD GHB EST-MCNC: 123 MG/DL
GFR SERPL CREATININE-BSD FRML MDRD: 51 ML/MIN/1.73SQ M
GFR SERPL CREATININE-BSD FRML MDRD: 55 ML/MIN/1.73SQ M
GLUCOSE P FAST SERPL-MCNC: 105 MG/DL (ref 65–99)
GLUCOSE SERPL-MCNC: 105 MG/DL (ref 65–140)
GLUCOSE SERPL-MCNC: 110 MG/DL (ref 65–140)
GLUCOSE UR STRIP-MCNC: NEGATIVE MG/DL
HBA1C MFR BLD: 5.9 %
HCT VFR BLD AUTO: 47.2 % (ref 36.5–49.3)
HCT VFR BLD AUTO: 48.5 % (ref 36.5–49.3)
HDLC SERPL-MCNC: 55 MG/DL
HGB BLD-MCNC: 15.6 G/DL (ref 12–17)
HGB BLD-MCNC: 15.9 G/DL (ref 12–17)
HGB UR QL STRIP.AUTO: ABNORMAL
IMM GRANULOCYTES # BLD AUTO: 0.01 THOUSAND/UL (ref 0–0.2)
IMM GRANULOCYTES NFR BLD AUTO: 0 % (ref 0–2)
INR PPP: 1.07 (ref 0.84–1.19)
KETONES UR STRIP-MCNC: NEGATIVE MG/DL
LDLC SERPL CALC-MCNC: 41 MG/DL (ref 0–100)
LEUKOCYTE ESTERASE UR QL STRIP: NEGATIVE
LYMPHOCYTES # BLD AUTO: 2.68 THOUSANDS/ΜL (ref 0.6–4.47)
LYMPHOCYTES NFR BLD AUTO: 41 % (ref 14–44)
MAGNESIUM SERPL-MCNC: 2.2 MG/DL (ref 1.6–2.6)
MCH RBC QN AUTO: 31.9 PG (ref 26.8–34.3)
MCH RBC QN AUTO: 32.1 PG (ref 26.8–34.3)
MCHC RBC AUTO-ENTMCNC: 32.8 G/DL (ref 31.4–37.4)
MCHC RBC AUTO-ENTMCNC: 33.1 G/DL (ref 31.4–37.4)
MCV RBC AUTO: 97 FL (ref 82–98)
MCV RBC AUTO: 97 FL (ref 82–98)
MONOCYTES # BLD AUTO: 0.65 THOUSAND/ΜL (ref 0.17–1.22)
MONOCYTES NFR BLD AUTO: 10 % (ref 4–12)
NEUTROPHILS # BLD AUTO: 3.05 THOUSANDS/ΜL (ref 1.85–7.62)
NEUTS SEG NFR BLD AUTO: 46 % (ref 43–75)
NITRITE UR QL STRIP: NEGATIVE
NON-SQ EPI CELLS URNS QL MICRO: NORMAL /HPF
NRBC BLD AUTO-RTO: 0 /100 WBCS
P AXIS: 45 DEGREES
PH UR STRIP.AUTO: 5.5 [PH] (ref 4.5–8)
PLATELET # BLD AUTO: 203 THOUSANDS/UL (ref 149–390)
PLATELET # BLD AUTO: 223 THOUSANDS/UL (ref 149–390)
PLATELET # BLD AUTO: 223 THOUSANDS/UL (ref 149–390)
PMV BLD AUTO: 10.3 FL (ref 8.9–12.7)
PMV BLD AUTO: 10.3 FL (ref 8.9–12.7)
PMV BLD AUTO: 9.7 FL (ref 8.9–12.7)
POTASSIUM SERPL-SCNC: 4.4 MMOL/L (ref 3.5–5.3)
POTASSIUM SERPL-SCNC: 4.4 MMOL/L (ref 3.5–5.3)
PR INTERVAL: 232 MS
PROT SERPL-MCNC: 6.8 G/DL (ref 6.4–8.2)
PROT UR STRIP-MCNC: NEGATIVE MG/DL
PROTHROMBIN TIME: 14.1 SECONDS (ref 11.6–14.5)
QRS AXIS: -24 DEGREES
QRSD INTERVAL: 76 MS
QT INTERVAL: 386 MS
QTC INTERVAL: 405 MS
RBC # BLD AUTO: 4.86 MILLION/UL (ref 3.88–5.62)
RBC # BLD AUTO: 4.99 MILLION/UL (ref 3.88–5.62)
RBC #/AREA URNS AUTO: NORMAL /HPF
SODIUM SERPL-SCNC: 137 MMOL/L (ref 136–145)
SODIUM SERPL-SCNC: 140 MMOL/L (ref 136–145)
SP GR UR STRIP.AUTO: 1.02 (ref 1–1.03)
T WAVE AXIS: 17 DEGREES
T4 FREE SERPL-MCNC: 1.25 NG/DL (ref 0.76–1.46)
TRIGL SERPL-MCNC: 136 MG/DL
TROPONIN I SERPL-MCNC: <0.02 NG/ML
TSH SERPL DL<=0.05 MIU/L-ACNC: 5.9 UIU/ML (ref 0.36–3.74)
UROBILINOGEN UR QL STRIP.AUTO: 0.2 E.U./DL
VENTRICULAR RATE: 66 BPM
WBC # BLD AUTO: 6.59 THOUSAND/UL (ref 4.31–10.16)
WBC # BLD AUTO: 8.81 THOUSAND/UL (ref 4.31–10.16)
WBC #/AREA URNS AUTO: NORMAL /HPF

## 2021-09-02 PROCEDURE — 70498 CT ANGIOGRAPHY NECK: CPT

## 2021-09-02 PROCEDURE — 93306 TTE W/DOPPLER COMPLETE: CPT | Performed by: INTERNAL MEDICINE

## 2021-09-02 PROCEDURE — 36415 COLL VENOUS BLD VENIPUNCTURE: CPT | Performed by: PHYSICIAN ASSISTANT

## 2021-09-02 PROCEDURE — 70551 MRI BRAIN STEM W/O DYE: CPT

## 2021-09-02 PROCEDURE — 80048 BASIC METABOLIC PNL TOTAL CA: CPT | Performed by: PHYSICIAN ASSISTANT

## 2021-09-02 PROCEDURE — 83036 HEMOGLOBIN GLYCOSYLATED A1C: CPT | Performed by: PHYSICIAN ASSISTANT

## 2021-09-02 PROCEDURE — 84443 ASSAY THYROID STIM HORMONE: CPT | Performed by: PHYSICIAN ASSISTANT

## 2021-09-02 PROCEDURE — 84484 ASSAY OF TROPONIN QUANT: CPT | Performed by: PHYSICIAN ASSISTANT

## 2021-09-02 PROCEDURE — G1004 CDSM NDSC: HCPCS

## 2021-09-02 PROCEDURE — 85730 THROMBOPLASTIN TIME PARTIAL: CPT | Performed by: PHYSICIAN ASSISTANT

## 2021-09-02 PROCEDURE — 85610 PROTHROMBIN TIME: CPT | Performed by: PHYSICIAN ASSISTANT

## 2021-09-02 PROCEDURE — 93010 ELECTROCARDIOGRAM REPORT: CPT | Performed by: INTERNAL MEDICINE

## 2021-09-02 PROCEDURE — 81001 URINALYSIS AUTO W/SCOPE: CPT

## 2021-09-02 PROCEDURE — 99285 EMERGENCY DEPT VISIT HI MDM: CPT | Performed by: PHYSICIAN ASSISTANT

## 2021-09-02 PROCEDURE — 70496 CT ANGIOGRAPHY HEAD: CPT

## 2021-09-02 PROCEDURE — 80061 LIPID PANEL: CPT | Performed by: PHYSICIAN ASSISTANT

## 2021-09-02 PROCEDURE — 97163 PT EVAL HIGH COMPLEX 45 MIN: CPT

## 2021-09-02 PROCEDURE — 84439 ASSAY OF FREE THYROXINE: CPT | Performed by: PHYSICIAN ASSISTANT

## 2021-09-02 PROCEDURE — 93005 ELECTROCARDIOGRAM TRACING: CPT

## 2021-09-02 PROCEDURE — 80053 COMPREHEN METABOLIC PANEL: CPT | Performed by: PHYSICIAN ASSISTANT

## 2021-09-02 PROCEDURE — 99204 OFFICE O/P NEW MOD 45 MIN: CPT | Performed by: PHYSICIAN ASSISTANT

## 2021-09-02 PROCEDURE — 85049 AUTOMATED PLATELET COUNT: CPT | Performed by: PHYSICIAN ASSISTANT

## 2021-09-02 PROCEDURE — 83735 ASSAY OF MAGNESIUM: CPT | Performed by: PHYSICIAN ASSISTANT

## 2021-09-02 PROCEDURE — 97116 GAIT TRAINING THERAPY: CPT

## 2021-09-02 PROCEDURE — 85025 COMPLETE CBC W/AUTO DIFF WBC: CPT | Performed by: PHYSICIAN ASSISTANT

## 2021-09-02 PROCEDURE — 93306 TTE W/DOPPLER COMPLETE: CPT

## 2021-09-02 PROCEDURE — 71045 X-RAY EXAM CHEST 1 VIEW: CPT

## 2021-09-02 PROCEDURE — 99285 EMERGENCY DEPT VISIT HI MDM: CPT

## 2021-09-02 PROCEDURE — 99220 PR INITIAL OBSERVATION CARE/DAY 70 MINUTES: CPT | Performed by: INTERNAL MEDICINE

## 2021-09-02 PROCEDURE — 85027 COMPLETE CBC AUTOMATED: CPT | Performed by: PHYSICIAN ASSISTANT

## 2021-09-02 RX ORDER — ASPIRIN 81 MG/1
81 TABLET ORAL DAILY
Status: DISCONTINUED | OUTPATIENT
Start: 2021-09-02 | End: 2021-09-03 | Stop reason: HOSPADM

## 2021-09-02 RX ORDER — LISINOPRIL 5 MG/1
20 TABLET ORAL ONCE
Status: COMPLETED | OUTPATIENT
Start: 2021-09-02 | End: 2021-09-02

## 2021-09-02 RX ORDER — ATORVASTATIN CALCIUM 40 MG/1
40 TABLET, FILM COATED ORAL
Status: DISCONTINUED | OUTPATIENT
Start: 2021-09-02 | End: 2021-09-03 | Stop reason: HOSPADM

## 2021-09-02 RX ORDER — CYCLOSPORINE 0.5 MG/ML
1 EMULSION OPHTHALMIC 2 TIMES DAILY
Status: DISCONTINUED | OUTPATIENT
Start: 2021-09-02 | End: 2021-09-03 | Stop reason: HOSPADM

## 2021-09-02 RX ORDER — MECLIZINE HCL 12.5 MG/1
12.5 TABLET ORAL EVERY 8 HOURS PRN
Status: DISCONTINUED | OUTPATIENT
Start: 2021-09-02 | End: 2021-09-03 | Stop reason: HOSPADM

## 2021-09-02 RX ORDER — SENNOSIDES 8.6 MG
2 TABLET ORAL
Status: DISCONTINUED | OUTPATIENT
Start: 2021-09-02 | End: 2021-09-03 | Stop reason: HOSPADM

## 2021-09-02 RX ORDER — ATORVASTATIN CALCIUM 40 MG/1
40 TABLET, FILM COATED ORAL EVERY EVENING
Status: DISCONTINUED | OUTPATIENT
Start: 2021-09-02 | End: 2021-09-02

## 2021-09-02 RX ORDER — HEPARIN SODIUM 5000 [USP'U]/ML
5000 INJECTION, SOLUTION INTRAVENOUS; SUBCUTANEOUS EVERY 8 HOURS SCHEDULED
Status: DISCONTINUED | OUTPATIENT
Start: 2021-09-02 | End: 2021-09-03 | Stop reason: HOSPADM

## 2021-09-02 RX ORDER — MINERAL OIL AND PETROLATUM 150; 830 MG/G; MG/G
OINTMENT OPHTHALMIC
Status: DISCONTINUED | OUTPATIENT
Start: 2021-09-02 | End: 2021-09-03 | Stop reason: HOSPADM

## 2021-09-02 RX ORDER — ATORVASTATIN CALCIUM 10 MG/1
10 TABLET, FILM COATED ORAL
Status: DISCONTINUED | OUTPATIENT
Start: 2021-09-02 | End: 2021-09-02

## 2021-09-02 RX ADMIN — MECLIZINE 12.5 MG: 12.5 TABLET ORAL at 13:53

## 2021-09-02 RX ADMIN — HEPARIN SODIUM 5000 UNITS: 5000 INJECTION INTRAVENOUS; SUBCUTANEOUS at 13:50

## 2021-09-02 RX ADMIN — MECLIZINE 12.5 MG: 12.5 TABLET ORAL at 22:16

## 2021-09-02 RX ADMIN — ATORVASTATIN CALCIUM 40 MG: 40 TABLET, FILM COATED ORAL at 22:06

## 2021-09-02 RX ADMIN — Medication 1 DROP: at 20:33

## 2021-09-02 RX ADMIN — SODIUM CHLORIDE 1000 ML: 0.9 INJECTION, SOLUTION INTRAVENOUS at 01:33

## 2021-09-02 RX ADMIN — ASPIRIN 81 MG: 81 TABLET, COATED ORAL at 09:00

## 2021-09-02 RX ADMIN — HEPARIN SODIUM 5000 UNITS: 5000 INJECTION INTRAVENOUS; SUBCUTANEOUS at 22:06

## 2021-09-02 RX ADMIN — LISINOPRIL 20 MG: 5 TABLET ORAL at 02:02

## 2021-09-02 RX ADMIN — ATORVASTATIN CALCIUM 10 MG: 10 TABLET, FILM COATED ORAL at 02:28

## 2021-09-02 RX ADMIN — IOHEXOL 100 ML: 350 INJECTION, SOLUTION INTRAVENOUS at 02:39

## 2021-09-02 RX ADMIN — STANDARDIZED SENNA CONCENTRATE 17.2 MG: 8.6 TABLET ORAL at 22:06

## 2021-09-02 NOTE — ED PROVIDER NOTES
History  Chief Complaint   Patient presents with    Dizziness     patient states he has dizziness all day, and at this moment denies dizzines, vision changes, nausea, vomiting, diarrhea, chest pain, shortness of breah or headache  Patient is an 55-year-old male with history of hypercholesteremia, hypertension, with no significant past surgical history that presents emergency department with worsening swaying dizziness symptoms for 1 day  Patient also verbalizes 1 bout of watery nonbloody diarrhea occurring earlier today  Patient has a history of dizziness symptoms and is currently being treated for dizziness symptoms at physical therapy  Patient states that he had a bout of swaying dizziness, now abated that patient further verbalizes inability to self ambulate at the time of dizziness symptoms  Patient states that he has no dizziness symptomatology at this time and is currently self ambulatory  Patient denies history of stroke, concussion, or seizures  Patient also denies numbness, tingling, and loss of power  Patient denies palliative and provocative factors  Patient denies not effective treatment  Patient denies fevers, chills, nausea, vomiting, constipation, and urinary symptoms  Patient denies headache, tinnitus, meningeal, and vertiginous symptoms  Patient denies sick contacts or recent travel  Patient denies recent fall recent trauma  Patient denies chest pain, shortness of breath, and abdominal pain  History provided by:  Patient   used: No        Prior to Admission Medications   Prescriptions Last Dose Informant Patient Reported? Taking?    Cholecalciferol (VITAMIN D3) 2000 units capsule   Yes Yes   Sig: Daily   RESTASIS 0 05 % ophthalmic emulsion   Yes Yes   Sig: INSTILL 1 DROP INTO BOTH EYES TWICE A DAY   aspirin 81 MG tablet   Yes Yes   Sig: Daily   atorvastatin (LIPITOR) 10 mg tablet   Yes Yes   Sig: Take 10 mg by mouth daily at bedtime   lisinopril (ZESTRIL) 20 mg tablet   Yes Yes   meclizine (ANTIVERT) 12 5 MG tablet Not Taking at Unknown time  Yes No   Sig: Take 12 5 mg by mouth 3 (three) times a day as needed   Patient not taking: Reported on 6/22/2021   ondansetron (ZOFRAN-ODT) 4 mg disintegrating tablet Not Taking at Unknown time  No No   Sig: Take 1 tablet (4 mg total) by mouth every 6 (six) hours as needed for nausea or vomiting   Patient not taking: Reported on 6/22/2021      Facility-Administered Medications: None       Past Medical History:   Diagnosis Date    BPH with obstruction/lower urinary tract symptoms     Cataract     Dizziness     Ear problems     Elevated PSA     Hypercholesterolemia     Hypertension     Poor urinary stream        Past Surgical History:   Procedure Laterality Date    CATARACT EXTRACTION      KNEE SURGERY  2005    PROSTATE BIOPSY  2005    ROTATOR CUFF REPAIR  06/15/2015       Family History   Problem Relation Age of Onset    Diabetes Mother     Heart disease Mother     Cancer Father     Diabetes Father      I have reviewed and agree with the history as documented  E-Cigarette/Vaping     E-Cigarette/Vaping Substances     Social History     Tobacco Use    Smoking status: Former Smoker    Smokeless tobacco: Never Used   Substance Use Topics    Alcohol use: Yes     Alcohol/week: 14 0 standard drinks     Types: 14 Cans of beer per week     Comment: 1-2 beers a day    Drug use: No       Review of Systems   Constitutional: Negative for activity change, appetite change, chills and fever  HENT: Negative for congestion, postnasal drip, rhinorrhea, sinus pressure, sinus pain, sore throat and tinnitus  Eyes: Negative for photophobia and visual disturbance  Respiratory: Negative for cough, chest tightness and shortness of breath  Cardiovascular: Negative for chest pain and palpitations  Gastrointestinal: Negative for abdominal pain, constipation, diarrhea, nausea and vomiting     Genitourinary: Negative for difficulty urinating, dysuria, flank pain, frequency and urgency  Musculoskeletal: Negative for back pain, gait problem, neck pain and neck stiffness  Skin: Negative for pallor and rash  Allergic/Immunologic: Negative for environmental allergies and food allergies  Neurological: Positive for dizziness  Negative for weakness, numbness and headaches  Psychiatric/Behavioral: Negative for confusion  All other systems reviewed and are negative  Physical Exam  Physical Exam  Vitals and nursing note reviewed  Constitutional:       General: He is awake  Appearance: Normal appearance  He is well-developed  He is not ill-appearing, toxic-appearing or diaphoretic  Comments: BP (!) 181/84 (BP Location: Left arm)   Pulse 72   Temp 98 7 °F (37 1 °C) (Oral)   Resp 18   Ht 5' 9" (1 753 m)   Wt 81 5 kg (179 lb 10 8 oz)   SpO2 99%   BMI 26 53 kg/m²      HENT:      Head: Normocephalic and atraumatic  Right Ear: Hearing, tympanic membrane, ear canal and external ear normal  No decreased hearing noted  No drainage, swelling or tenderness  No mastoid tenderness  Left Ear: Hearing, tympanic membrane, ear canal and external ear normal  No decreased hearing noted  No drainage, swelling or tenderness  No mastoid tenderness  Nose: Nose normal       Mouth/Throat:      Lips: Pink  Mouth: Mucous membranes are moist       Pharynx: Oropharynx is clear  Uvula midline  Eyes:      General: Lids are normal  Vision grossly intact  Right eye: No discharge  Left eye: No discharge  Extraocular Movements: Extraocular movements intact  Conjunctiva/sclera: Conjunctivae normal       Pupils: Pupils are equal, round, and reactive to light  Neck:      Thyroid: No thyroid mass  Vascular: No JVD  Trachea: Trachea and phonation normal  No tracheal tenderness or tracheal deviation  Cardiovascular:      Rate and Rhythm: Normal rate and regular rhythm        Pulses: Normal pulses  Radial pulses are 2+ on the right side and 2+ on the left side  Posterior tibial pulses are 2+ on the right side and 2+ on the left side  Heart sounds: Normal heart sounds  Pulmonary:      Effort: Pulmonary effort is normal       Breath sounds: Normal breath sounds  No stridor  No decreased breath sounds, wheezing, rhonchi or rales  Chest:      Chest wall: No tenderness  Abdominal:      General: Abdomen is flat  Bowel sounds are normal  There is no distension  Palpations: Abdomen is soft  Abdomen is not rigid  Tenderness: There is no abdominal tenderness  There is no guarding or rebound  Musculoskeletal:         General: Normal range of motion  Cervical back: Full passive range of motion without pain, normal range of motion and neck supple  No rigidity  No spinous process tenderness or muscular tenderness  Normal range of motion  Comments: Passive ROM intact  Upper and lower extremity 4/5 bilaterally  Neurovascularly intact  No grinding or clicking of joints       Lymphadenopathy:      Head:      Right side of head: No submental, submandibular, tonsillar, preauricular, posterior auricular or occipital adenopathy  Left side of head: No submental, submandibular, tonsillar, preauricular, posterior auricular or occipital adenopathy  Cervical: No cervical adenopathy  Right cervical: No superficial, deep or posterior cervical adenopathy  Left cervical: No superficial, deep or posterior cervical adenopathy  Skin:     General: Skin is warm  Capillary Refill: Capillary refill takes less than 2 seconds  Neurological:      General: No focal deficit present  Mental Status: He is alert and oriented to person, place, and time  GCS: GCS eye subscore is 4  GCS verbal subscore is 5  GCS motor subscore is 6  Cranial Nerves: Cranial nerves are intact  Sensory: Sensation is intact  No sensory deficit        Motor: Motor function is intact  Coordination: Coordination is intact  Gait: Gait is intact  Deep Tendon Reflexes: Reflexes are normal and symmetric  Reflex Scores:       Patellar reflexes are 2+ on the right side and 2+ on the left side  Psychiatric:         Mood and Affect: Mood normal          Speech: Speech normal          Behavior: Behavior normal  Behavior is cooperative  Thought Content:  Thought content normal          Judgment: Judgment normal          Vital Signs  ED Triage Vitals [09/02/21 0058]   Temperature Pulse Respirations Blood Pressure SpO2   98 7 °F (37 1 °C) 72 18 (!) 181/84 99 %      Temp Source Heart Rate Source Patient Position - Orthostatic VS BP Location FiO2 (%)   Oral Monitor Lying Left arm --      Pain Score       No Pain           Vitals:    09/02/21 0058 09/02/21 0200 09/02/21 0202 09/02/21 0400   BP: (!) 181/84 (!) 174/89 (!) 174/89 155/76   Pulse: 72 68  68   Patient Position - Orthostatic VS: Lying Lying           Visual Acuity  Visual Acuity      Most Recent Value   L Pupil Size (mm)  3   R Pupil Size (mm)  3   L Pupil Shape  Round   R Pupil Shape  Round          ED Medications  Medications   atorvastatin (LIPITOR) tablet 10 mg (10 mg Oral Given 9/2/21 0228)   sodium chloride 0 9 % bolus 1,000 mL (0 mL Intravenous Stopped 9/2/21 0203)   lisinopril (ZESTRIL) tablet 20 mg (20 mg Oral Given 9/2/21 0202)   iohexol (OMNIPAQUE) 350 MG/ML injection (SINGLE-DOSE) 100 mL (100 mL Intravenous Given 9/2/21 0239)       Diagnostic Studies  Results Reviewed     Procedure Component Value Units Date/Time    Troponin I [[id number]]  (Normal) Collected: 09/02/21 0201    Lab Status: Final result Specimen: Blood from Arm, Right Updated: 09/02/21 0247     Troponin I <0 02 ng/mL     Urine Microscopic [[id number]]  (Normal) Collected: 09/02/21 0140    Lab Status: Final result Specimen: Urine, Other Updated: 09/02/21 0229     RBC, UA 2-4 /hpf      WBC, UA 0-1 /hpf      Epithelial Cells Occasional /hpf Bacteria, UA Occasional /hpf     TSH, 3rd generation with Free T4 reflex [[id number]]  (Abnormal) Collected: 09/02/21 0132    Lab Status: Final result Specimen: Blood from Arm, Right Updated: 09/02/21 0214     TSH 3RD GENERATON 5 903 uIU/mL     Narrative:      Patients undergoing fluorescein dye angiography may retain small amounts of fluorescein in the body for 48-72 hours post procedure  Samples containing fluorescein can produce falsely depressed TSH values  If the patient had this procedure,a specimen should be resubmitted post fluorescein clearance  Magnesium [[id number]]  (Normal) Collected: 09/02/21 0132    Lab Status: Final result Specimen: Blood from Arm, Right Updated: 09/02/21 0214     Magnesium 2 2 mg/dL     T4, free [[id number]] Collected: 09/02/21 0132    Lab Status:  In process Specimen: Blood from Arm, Right Updated: 09/02/21 0214    Comprehensive metabolic panel [[id number]] Collected: 09/02/21 0132    Lab Status: Final result Specimen: Blood from Arm, Right Updated: 09/02/21 0206     Sodium 137 mmol/L      Potassium 4 4 mmol/L      Chloride 103 mmol/L      CO2 24 mmol/L      ANION GAP 10 mmol/L      BUN 15 mg/dL      Creatinine 1 27 mg/dL      Glucose 110 mg/dL      Calcium 9 1 mg/dL      AST 34 U/L      ALT 38 U/L      Alkaline Phosphatase 75 U/L      Total Protein 6 8 g/dL      Albumin 3 7 g/dL      Total Bilirubin 0 93 mg/dL      eGFR 51 ml/min/1 73sq m     Narrative:      [facility] guidelines for Chronic Kidney Disease (CKD):     Stage 1 with normal or high GFR (GFR > 90 mL/min/1 73 square meters)    Stage 2 Mild CKD (GFR = 60-89 mL/min/1 73 square meters)    Stage 3A Moderate CKD (GFR = 45-59 mL/min/1 73 square meters)    Stage 3B Moderate CKD (GFR = 30-44 mL/min/1 73 square meters)    Stage 4 Severe CKD (GFR = 15-29 mL/min/1 73 square meters)    Stage 5 End Stage CKD (GFR <15 mL/min/1 73 square meters)  Note: GFR calculation is accurate only with a steady state creatinine    Protime-INR [[id number]]  (Normal) Collected: 09/02/21 0132    Lab Status: Final result Specimen: Blood from Arm, Right Updated: 09/02/21 0204     Protime 14 1 seconds      INR 1 07    APTT [[id number]]  (Normal) Collected: 09/02/21 0132    Lab Status: Final result Specimen: Blood from Arm, Right Updated: 09/02/21 0204     PTT 30 seconds     CBC and differential [[id number]] Collected: 09/02/21 0132    Lab Status: Final result Specimen: Blood from Arm, Right Updated: 09/02/21 0153     WBC 6 59 Thousand/uL      RBC 4 86 Million/uL      Hemoglobin 15 6 g/dL      Hematocrit 47 2 %      MCV 97 fL      MCH 32 1 pg      MCHC 33 1 g/dL      RDW 12 8 %      MPV 9 7 fL      Platelets 329 Thousands/uL      nRBC 0 /100 WBCs      Neutrophils Relative 46 %      Immat GRANS % 0 %      Lymphocytes Relative 41 %      Monocytes Relative 10 %      Eosinophils Relative 2 %      Basophils Relative 1 %      Neutrophils Absolute 3 05 Thousands/µL      Immature Grans Absolute 0 01 Thousand/uL      Lymphocytes Absolute 2 68 Thousands/µL      Monocytes Absolute 0 65 Thousand/µL      Eosinophils Absolute 0 16 Thousand/µL      Basophils Absolute 0 04 Thousands/µL     Urine Macroscopic, POC [[id number]]  (Abnormal) Collected: 09/02/21 0140    Lab Status: Final result Specimen: Urine Updated: 09/02/21 0141     Color, UA Yellow     Clarity, UA Clear     pH, UA 5 5     Leukocytes, UA Negative     Nitrite, UA Negative     Protein, UA Negative mg/dl      Glucose, UA Negative mg/dl      Ketones, UA Negative mg/dl      Urobilinogen, UA 0 2 E U /dl      Bilirubin, UA Negative     Blood, UA Trace     Specific [place], UA 1 020    Narrative:      CLINITEK RESULT                 CTA head and neck with and without contrast   ED Interpretation by Amirah Gonzalez PA-C (09/02 0345)   Yasmin Lanier MD  [phone] 9/2/2021     Narrative & Impression  CTA NECK AND BRAIN WITH AND WITHOUT CONTRAST     INDICATION: Dizziness, non-specific  Dizziness, persistent/recurrent, cardiac or vascular cause suspected  Dizziness, worsening today patient reports difficulty ambulating earlier today  Patient verbalizes being asymptomatic at this time     COMPARISON:   CT temporal bones is April 10, 2021      TECHNIQUE:  Routine CT imaging of the Brain without contrast   Post contrast imaging was performed after administration of iodinated contrast through the neck and brain  Post contrast axial 0 625 mm images timed to opacify the arterial system        3D rendering was performed on an independent workstation  MIP reconstructions performed  Coronal reconstructions were performed of the noncontrast portion of the brain        Radiation dose length product (DLP) for this visit:  5453 mGy-cm   This examination, like all CT scans performed in the [facility], w   as performed utilizing techniques to minimize radiation dose exposure, including the use of iterative   reconstruction and automated exposure control         IV Contrast:  100 mL of iohexol (OMNIPAQUE)     IMAGE QUALITY:   Diagnostic     FINDINGS:  NONCONTRAST BRAIN  PARENCHYMA:  No intracranial mass, mass effect or midline shift  No CT signs of acute infarction  No acute parenchymal hemorrhage      VENTRICLES AND EXTRA-AXIAL SPACES:  Normal for the patient's age      VISUALIZED ORBITS AND PARANASAL SINUSES:  Unremarkable      CERVICAL VASCULATURE  AORTIC ARCH AND GREAT VESSELS:  Normal aortic arch and great vessel origins  Normal visualized subclavian vessels      RIGHT VERTEBRAL ARTERY CERVICAL SEGMENT:  Normal origin  The vessel is normal in caliber throughout the neck      LEFT VERTEBRAL ARTERY CERVICAL SEGMENT: There is severe stenosis at the origin of the left vertebral artery  There is no flow noted within the distal V1 segment  There is detectable flow originating at the mi   d V2 segment at the C3 vertebral body level      The remainder of the left vertebral artery demonstrates enhancement      RIGHT EXTRACRANIAL CAROTID SEGMENT:  Moderate atherosclerotic disease of the bifurcation causing approximately 60% stenosis by NASCET criteria  The remainder the cervical segment is normal in course and caliber          LEFT EXTRACRANIAL CAROTID SEGMENT: Mild atherosclerotic disease of the distal common carotid artery and proximal cervical internal carotid artery without significant stenosis compared to the more distal ICA      NASCET criteria was used to determine the degree of internal carotid artery diameter stenosis        INTRACRANIAL VASCULATURE   INTERNAL CAROTID ARTERIES:  Normal enhancement of the intracranial portions of the internal carotid arteries  There is mild atherosclerotic calcific plaquing of both cavernous segments  Normal ophthalmic artery origins  Normal ICA terminus       ANTERIOR CIRCULATION:  Symmetric A1 segments and anterior cerebral a   rteries with normal enhancement  Normal anterior communicating artery      MIDDLE CEREBRAL ARTERY CIRCULATION:  M1 segment and middle cerebral artery branches demonstrate normal enhancement bilaterally      DISTAL VERTEBRAL ARTERIES:  There is also irregularity with areas of stenosis involving the distal left vertebral artery, likely related to atherosclerotic disease  The distal right vertebral artery is unremarkable  Posterior inferior cerebellar artery   origins are normal  Normal vertebral basilar junction      BASILAR ARTERY:  Basilar artery is normal in caliber  Normal superior cerebellar arteries      POSTERIOR CEREBRAL ARTERIES: There is fetal origin of the right posterior cerebral artery  The left posterior cerebral artery arises from the basilar tip  Both demonstrate no focal stenosis  Normal posterior communicating arteries      DURAL VENOUS SINUSES:  Normal         NON VASCULAR ANATOMY  BONY STRUCTURES:  No acute osseous abnormality  Again noted is right superior    semicircular canal dehiscence  There are moderate multilevel spondylotic degenerative changes of the cervical spine  There is fusion of the C5 and C6 vertebral bodies  There is grade 1 anterolisthesis of C4 on C5 and C7 on T1      SOFT TISSUES OF THE NECK:  Normal      THORACIC INLET:  Unremarkable         IMPRESSION:     No acute intracranial abnormality      No focal stenosis or saccular aneurysm within the [place]      Moderate atherosclerotic calcific plaquing of the right carotid bulb causing approximately 60% stenosis by NASCET criteria      Severe stenosis at the origin of the left vertebral artery with a long segment of no detectable flow involving the V1 and proximal to mid V2 segments  These findings are age indeterminant  No prior studies are available for comparison            Workstation performed: [id number]        Final Result by Mine Mason MD (09/02 5941)      No acute intracranial abnormality  No focal stenosis or saccular aneurysm within the [place]  Moderate atherosclerotic calcific plaquing of the right carotid bulb causing approximately 60% stenosis by NASCET criteria  Severe stenosis at the origin of the left vertebral artery with a long segment of no detectable flow involving the V1 and proximal to mid V2 segments  These findings are age indeterminant  No prior studies are available for comparison  Workstation performed: [id number]         XR chest 1 view portable   ED Interpretation by Aminah Batista PA-C (09/02 0255)   No acute cardiopulmonary disease on initial read                 Procedures  ECG 12 Lead Documentation Only    Date/Time: 9/2/2021 1:12 AM  Performed by: Aminah Batista PA-C  Authorized by: Aminah Batista PA-C     Indications / Diagnosis:  Dizziness  ECG reviewed by me, the ED Provider: yes    Patient location:  ED  Previous ECG:     Previous ECG:  Compared to current    Comparison ECG info:   When compared with ECG July 7, 2018 no significant changes were noted    Similarity:  No change    Comparison to cardiac monitor: Yes    Interpretation:     Interpretation: normal    Rate:     ECG rate:  66    ECG rate assessment: normal    Rhythm:     Rhythm: sinus rhythm    Ectopy:     Ectopy: none    QRS:     QRS axis:  Left    QRS intervals:  Normal  Conduction:     Conduction: abnormal      Abnormal conduction: incomplete RBBB    ST segments:     ST segments:  Normal  T waves:     T waves: normal               ED Course                 Stroke Assessment     Row Name 09/02/21 0135             NIH Stroke Scale    Interval  Baseline      Level of Consciousness (1a )  0      LOC Questions (1b )  0      LOC Commands (1c )  0      Best Gaze (2 )  0      Visual (3 )  0      Facial Palsy (4 )  0      Motor Arm, Left (5a )  0      Motor Arm, Right (5b )  0      Motor Leg, Left (6a )  0      Motor Leg, Right (6b )  0      Limb Ataxia (7 )  0      Sensory (8 )  0      Best Language (9 )  0      Dysarthria (10 )  0      Extinction and Inattention (11 ) (Formerly Neglect)  --      Total  --                    SBIRT 20yo+      Most Recent Value   SBIRT (23 yo +)   In order to provide better care to our patients, we are screening all of our patients for alcohol and drug use  Would it be okay to ask you these screening questions? Yes Filed at: 09/02/2021 0101   Initial Alcohol Screen: US AUDIT-C    1  How often do you have a drink containing alcohol?  0 Filed at: 09/02/2021 0101   2  How many drinks containing alcohol do you have on a typical day you are drinking? 0 Filed at: 09/02/2021 0101   3b  FEMALE Any Age, or MALE 65+: How often do you have 4 or more drinks on one occassion? 0 Filed at: 09/02/2021 0101   Audit-C Score  0 Filed at: 09/02/2021 0101   ALONDRA: How many times in the past year have you    Used an illegal drug or used a prescription medication for non-medical reasons?   Never Filed at: 09/02/2021 0101                    MDM  Number of Diagnoses or Management Options  Dizziness: new and requires workup  Stenosis of left vertebral artery: new and requires workup     Amount and/or Complexity of Data Reviewed  Clinical lab tests: ordered and reviewed  Tests in the radiology section of CPT®: ordered and reviewed  Review and summarize past medical records: yes    Risk of Complications, Morbidity, and/or Mortality  Presenting problems: moderate  Diagnostic procedures: moderate  Management options: moderate    Patient Progress  Patient progress: stable     Patient is an 14-year-old male with history of hypercholesteremia, hypertension, with no significant past surgical history that presents emergency department with worsening swaying dizziness symptoms for 1 day  Patient also verbalizes 1 bout of watery nonbloody diarrhea occurring earlier today  Patient has a history of dizziness symptoms and is currently being treated for dizziness symptoms at physical therapy  Patient hemodynamically stable and afebrile  NIH 0; re-evaluation with patient at baseline mentation, no neurological/focal deficits  ECG with normal sinus rhythm; negative troponin  Chest x-ray no acute cardiopulmonary disease on initial read  Elevated TSH with free T4 in process  Patient requested his nighttime medication delivered atorvastatin and lisinopril  Urinalysis not indicative of urinary tract infection  Normal electrolytes, normal glucose  CTA head and neck with contrast-impression-No acute intracranial abnormality  No focal stenosis or saccular aneurysm within the [place]  Moderate atherosclerotic calcific plaquing of the right carotid bulb causing approximately 60% stenosis by NASCET criteria     Severe stenosis at the origin of the left vertebral artery with a long segment of no detectable flow involving the V1 and proximal to mid V2 segments   These findings are age indeterminant   No prior studies are available for comparison "  Tiger text to Dr Silvano Trimble, Vascular and shared ct findings with indication to admit under slim service  Discussed patient case with brodie Billingsley and both agreed to make patient observational status under the care of Dr Rolo Mcqueen, Internal Medicine  Patient with verbal understanding of all clinical laboratory imaging findings, admission instructions and verbalized agreement with patient current treatment plan  Disposition  Final diagnoses:   Dizziness   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   Elevated TSH     Time reflects when diagnosis was documented in both MDM as applicable and the Disposition within this note     Time User Action Codes Description Comment    9/2/2021  3:46 AM Taniya Cradle Add [R42] Dizziness     9/2/2021  3:47 AM Taniya Cradle Add [I65 02] Stenosis of left vertebral artery     9/2/2021  3:47 AM Taniya Cradle Modify [I65 02] Stenosis of left vertebral artery severe    9/2/2021  4:44 AM Taniya Cradle Add [R79 89] Elevated TSH       ED Disposition     ED Disposition Condition Date/Time Comment    Admit Stable Thu Sep 2, 2021  4:44 AM Case was discussed with Kelley Nelson and the patient's admission status was agreed to be Admission Status: observation status to the service of Dr Rolo Mcqueen, Internal Medicine   Follow-up Information    None         Patient's Medications   Discharge Prescriptions    No medications on file     No discharge procedures on file      PDMP Review       Value Time User    PDMP Reviewed  Yes 9/2/2021  3:18 AM Brien Crews PA-C          ED Provider  Electronically Signed by           Brien Crews PA-C  09/02/21 0500       Brien Crews PA-C  09/02/21 0500       Brien Crews PA-C  09/02/21 9160

## 2021-09-02 NOTE — CONSULTS
Consultation -General Surgery  Simin Lam 80 y o  male MRN: [id number]  Unit/Bed#: FT 01 Encounter: [id number]      ASSESSMENT:  81 yo male with persistent dizziness and new CTA findings of left vertebral artery stenosis   AVSS  MRI head negative for acute abnormalities   Carotid u/s pending     Medical Problems     Problem List     * (Principal) Vertebral artery stenosis    Atypical chest pain    Essential hypertension    Lipoma of axilla    Mixed hyperlipidemia    BPH with obstruction/lower urinary tract symptoms    Elevated PSA    Other microscopic hematuria    Generalized weakness    Thrombocytopenia (HCC)    Hyponatremia    Constipation    Vertigo of central origin    Bilateral sensorineural hearing loss    Dizziness    Raised TSH level                  Plan:  -no acute surgical intervention at this time  -f/u carotid u/s   -continue ASA81/statin   -rest of care per primary team    Reason for Consult / Principal Problem:    HPI: Simin Lam is a 80y o  year old male with pmhx BPH, HLD, HLD and dizziness who presents with worsening dizziness and Vertebral artery stenosis noted on CTA  The patient reports persistent dizziness over the last year which he has been worked up outpatient with ENT  He reports over the last 2 weeks the dizziness has worsened and then yesterday it became severe to the point he was very unsteady on his feet he was afraid he would hurt fall and injury himself  The dizziness is described as unsteadiness on his feet and occasional diplopia L>R  Patient denies vision loss, difficulty with speech, weakness of his extremities, or HA  Review of Systems   Constitutional: Negative for activity change, appetite change, chills and fever  Respiratory: Negative for shortness of breath  Cardiovascular: Negative for chest pain  Gastrointestinal: Positive for constipation  Negative for abdominal distention, nausea and vomiting  Neurological: Positive for dizziness   Negative for syncope, facial asymmetry, speech difficulty, weakness, light-headedness, numbness and headaches  Psychiatric/Behavioral: Negative for confusion  Historical Information   Past Medical History:   Diagnosis Date    BPH with obstruction/lower urinary tract symptoms     Cataract     Dizziness     Ear problems     Elevated PSA     Hypercholesterolemia     Hypertension     Poor urinary stream      Past Surgical History:   Procedure Laterality Date    CATARACT EXTRACTION      KNEE SURGERY  2005    PROSTATE BIOPSY  2005    ROTATOR CUFF REPAIR  06/15/2015     Social History   Social History     Substance and Sexual Activity   Alcohol Use Yes    Alcohol/week: 14 0 standard drinks    Types: 14 Cans of beer per week    Comment: 1-2 beers a day     Social History     Substance and Sexual Activity   Drug Use No     Social History     Tobacco Use   Smoking Status Former Smoker   Smokeless Tobacco Never Used     Family History   Problem Relation Age of Onset    Diabetes Mother     Heart disease Mother     Cancer Father     Diabetes Father        Meds/Allergies     (Not in a hospital admission)    Current Facility-Administered Medications   Medication Dose Route Frequency    artificial tear (LUBRIFRESH P M ) ophthalmic ointment   Both Eyes HS    aspirin (ECOTRIN LOW STRENGTH) EC tablet 81 mg  81 mg Oral Daily    atorvastatin (LIPITOR) tablet 40 mg  40 mg Oral QPM    heparin (porcine) subcutaneous injection 5,000 Units  5,000 Units Subcutaneous Q8H [facility]    meclizine (ANTIVERT) tablet 12 5 mg  12 5 mg Oral Q8H PRN       No Known Allergies    Objective     Blood pressure 164/70, pulse 72, temperature 98 7 °F (37 1 °C), temperature source Oral, resp  rate 16, height 5' 9" (1 753 m), weight 81 5 kg (179 lb 10 8 oz), SpO2 97 %      Intake/Output Summary (Last 24 hours) at 9/2/2021 1119  Last data filed at 9/2/2021 0203  Gross per 24 hour   Intake 1000 ml   Output --   Net 1000 ml       PHYSICAL EXAM  Physical Exam  Vitals reviewed  Constitutional:       General: He is not in acute distress  Appearance: Normal appearance  He is not ill-appearing  Cardiovascular:      Rate and Rhythm: Normal rate  Pulses:           Radial pulses are 2+ on the right side and 2+ on the left side  Dorsalis pedis pulses are 2+ on the right side and 2+ on the left side  Posterior tibial pulses are 2+ on the right side and 2+ on the left side  Pulmonary:      Effort: Pulmonary effort is normal  No respiratory distress  Breath sounds: Normal breath sounds  Musculoskeletal:      Right lower leg: No edema  Left lower leg: No edema  Skin:     General: Skin is warm and dry  Neurological:      General: No focal deficit present  Mental Status: He is alert and oriented to person, place, and time  Psychiatric:         Mood and Affect: Mood normal          Behavior: Behavior normal        Lab Results:   I have personally reviewed pertinent lab results    , CBC:   Lab Results   Component Value Date    WBC 8 81 09/02/2021    HGB 15 9 09/02/2021    HCT 48 5 09/02/2021    MCV 97 09/02/2021     09/02/2021     09/02/2021    MCH 31 9 09/02/2021    MCHC 32 8 09/02/2021    RDW 12 9 09/02/2021    MPV 10 3 09/02/2021    MPV 10 3 09/02/2021    NRBC 0 09/02/2021   , CMP:   Lab Results   Component Value Date    SODIUM 140 09/02/2021    K 4 4 09/02/2021     09/02/2021    CO2 26 09/02/2021    BUN 13 09/02/2021    CREATININE 1 19 09/02/2021    CALCIUM 9 3 09/02/2021    AST 34 09/02/2021    ALT 38 09/02/2021    ALKPHOS 75 09/02/2021    EGFR 55 09/02/2021   , Coagulation:   Lab Results   Component Value Date    INR 1 07 09/02/2021   , Urinalysis:   Lab Results   Component Value Date    COLORU Yellow 09/02/2021    CLARITYU Clear 09/02/2021    SPECGRAV 1 020 09/02/2021    PHUR 5 5 09/02/2021    LEUKOCYTESUR Negative 09/02/2021    NITRITE Negative 09/02/2021    GLUCOSEU Negative 09/02/2021    Lucnehemiah Hester Negative 09/02/2021    BILIRUBINUR Negative 09/02/2021    BLOODU Trace (A) 09/02/2021     Imaging: I have personally reviewed pertinent reports  9/2/21 CTA head/neck:   IMPRESSION:  No acute intracranial abnormality  No focal stenosis or saccular aneurysm within the [place]  Moderate atherosclerotic calcific plaquing of the right carotid bulb causing approximately 60% stenosis by NASCET criteria  Severe stenosis at the origin of the left vertebral artery with a long segment of no detectable flow involving the V1 and proximal to mid V2 segments  These findings are age indeterminant  No prior studies are available for comparison        EKG, Pathology, and Other Studies: I have personally reviewed pertinent reports  Counseling / Coordination of Care  Total time spent today  30 minutes  Greater than 50% of total time was spent with the patient and / or family counseling and / or coordination of care       Jovani Rao PA-C

## 2021-09-02 NOTE — SPEECH THERAPY NOTE
Speech Language/Pathology  Speech/Language Pathology  Assessment    Patient Name: Eliot Stauffer  BZOSU'E Date: 9/2/2021     Problem List  Principal Problem:    Vertebral artery stenosis  Active Problems:    Essential hypertension    Mixed hyperlipidemia    Dizziness    Raised TSH level    Past Medical History  Past Medical History:   Diagnosis Date    BPH with obstruction/lower urinary tract symptoms     Cataract     Dizziness     Ear problems     Elevated PSA     Hypercholesterolemia     Hypertension     Poor urinary stream      Past Surgical History  Past Surgical History:   Procedure Laterality Date    CATARACT EXTRACTION      KNEE SURGERY  2005    PROSTATE BIOPSY  2005    ROTATOR CUFF REPAIR  06/15/2015     Eliot Stauffer is a 80 y o  male with a PMH of hyperlipidemia, hypertension who presents with vertebral artery stenosis  Patient  presents to the emergency department for acute onset of severe dizziness occurring tonight  He was having associated difficulty with ambulation secondary to dizziness  Patient has been worked up for dizziness in the past and has 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  Patient states that he was seeing physical therapy but was intermittently dizzy  He states that the dizziness tonight was worse than he has ever experienced  He had no associated nausea or vomiting  He had no weakness, tingling, numbness, headaches, or other associated neurologic changes  He did state that he was having some changes in vision but associated this with a change in his lens prescription that he underwent recently  CTA done of the head and neck in the emergency department such severe stenosis of the origin of the left vertebral artery with long segment of no detectable flow involving the V1, and proximal to mid V2 segments  These findings were not seen on previous MRI done in April by outpatient ENT    ED provider did discuss case with on-call vascular surgery fellow who recommended patient be admitted with vascular surgery consultation  Consult received for speech/swallow eval on stroke pathway  Pt passed nsg swallow screen; tolerating regular diet w/o s/s dysphagia or aspiration  No speech/language deficits reported  NIH score is 0  MRI: pending  No need for formal speech/swallow eval at this time  Reconsult if needed      Benjamin Hare CCC-SLP  Speech Pathologist  Available via  tiger text

## 2021-09-02 NOTE — PLAN OF CARE
Problem: PHYSICAL THERAPY ADULT  Goal: Performs mobility at highest level of function for planned discharge setting  See evaluation for individualized goals  Description: Treatment/Interventions: Functional transfer training, LE strengthening/ROM, Therapeutic exercise, Elevations, Endurance training, Patient/family training, Equipment eval/education, Bed mobility, Gait training  Equipment Recommended: Cane       See flowsheet documentation for full assessment, interventions and recommendations  Note: Prognosis: Fair  Problem List: Decreased strength, Decreased endurance, Impaired balance, Decreased mobility, Decreased coordination, Impaired judgement, Decreased safety awareness  Assessment: Pt is a 80 y o  male seen for PT evaluation s/p admit to [address] [place] [address] on 9/2/2021 w/ Vertebral artery stenosis  Order placed for PT  Comorbidities affecting pt's physical performance at time of assessment include: HTN, limited vision and dizziness  Personal factors affecting pt at time of IE include: multi-level environment, advanced age, inability to perform IADLs and inability to perform ADLs  Prior to admission, pt was was independent w/ all functional mobility w/ out AD, ambulated community distances and elevations, lived in multi-level home and lived with wife  Upon evaluation: Pt requires supervision for bed mobility, supervision for sit to stand, and supervision for ambulation with x1 LOB requiring min A from PT  (Please find full objective findings from PT assessment regarding body systems outlined above)  Impairments and limitations also listed above, especially due to  weakness, impaired balance, decreased endurance, gait deviations, decreased activity tolerance, decreased safety awareness and fall risk  The following objective measures performed on IE also reveal limitations: Barthel Index 65/100   Pt's clinical presentation is currently unstable/unpredictable seen in pt's presentation of decreased safety awareness, fall risk, and dizziness with mobility/(+) LOB  Pt to benefit from continued skilled PT tx while in hospital and upon DC to address deficits as defined above and maximize level of functional mobility  From PT/mobility standpoint, recommendation at time of d/c would be OP PT, home with family support and with cane pending progress  Recommend progression of ambulation and initiation of HEP/stair negotiation as appropriate  PT Discharge Recommendation: Home with outpatient rehabilitation (for high level balance/LE strengthening)          See flowsheet documentation for full assessment

## 2021-09-02 NOTE — DISCHARGE INSTRUCTIONS
Shiva Barker MD  [phone] 9/2/2021       Narrative & Impression  CTA NECK AND BRAIN WITH AND WITHOUT CONTRAST     INDICATION: Dizziness, non-specific  Dizziness, persistent/recurrent, cardiac or vascular cause suspected  Dizziness, worsening today patient reports difficulty ambulating earlier today  Patient verbalizes being asymptomatic at this time     COMPARISON:   CT temporal bones is April 10, 2021  TECHNIQUE:  Routine CT imaging of the Brain without contrast   Post contrast imaging was performed after administration of iodinated contrast through the neck and brain  Post contrast axial 0 625 mm images timed to opacify the arterial system  3D rendering was performed on an independent workstation  MIP reconstructions performed  Coronal reconstructions were performed of the noncontrast portion of the brain  Radiation dose length product (DLP) for this visit:  1438 mGy-cm   This examination, like all CT scans performed in the [facility], was performed utilizing techniques to minimize radiation dose exposure, including the use of iterative   reconstruction and automated exposure control  IV Contrast:  100 mL of iohexol (OMNIPAQUE)     IMAGE QUALITY:   Diagnostic     FINDINGS:  NONCONTRAST BRAIN  PARENCHYMA:  No intracranial mass, mass effect or midline shift  No CT signs of acute infarction  No acute parenchymal hemorrhage  VENTRICLES AND EXTRA-AXIAL SPACES:  Normal for the patient's age  VISUALIZED ORBITS AND PARANASAL SINUSES:  Unremarkable  CERVICAL VASCULATURE  AORTIC ARCH AND GREAT VESSELS:  Normal aortic arch and great vessel origins  Normal visualized subclavian vessels  RIGHT VERTEBRAL ARTERY CERVICAL SEGMENT:  Normal origin  The vessel is normal in caliber throughout the neck  LEFT VERTEBRAL ARTERY CERVICAL SEGMENT: There is severe stenosis at the origin of the left vertebral artery    There is no flow noted within the distal V1 segment  There is detectable flow originating at the mid V2 segment at the C3 vertebral body level  The remainder of the left vertebral artery demonstrates enhancement  RIGHT EXTRACRANIAL CAROTID SEGMENT:  Moderate atherosclerotic disease of the bifurcation causing approximately 60% stenosis by NASCET criteria  The remainder the cervical segment is normal in course and caliber  Blanca Hitchcock LEFT EXTRACRANIAL CAROTID SEGMENT: Mild atherosclerotic disease of the distal common carotid artery and proximal cervical internal carotid artery without significant stenosis compared to the more distal ICA  NASCET criteria was used to determine the degree of internal carotid artery diameter stenosis  INTRACRANIAL VASCULATURE   INTERNAL CAROTID ARTERIES:  Normal enhancement of the intracranial portions of the internal carotid arteries  There is mild atherosclerotic calcific plaquing of both cavernous segments  Normal ophthalmic artery origins  Normal ICA terminus  ANTERIOR CIRCULATION:  Symmetric A1 segments and anterior cerebral arteries with normal enhancement  Normal anterior communicating artery  MIDDLE CEREBRAL ARTERY CIRCULATION:  M1 segment and middle cerebral artery branches demonstrate normal enhancement bilaterally  DISTAL VERTEBRAL ARTERIES:  There is also irregularity with areas of stenosis involving the distal left vertebral artery, likely related to atherosclerotic disease  The distal right vertebral artery is unremarkable  Posterior inferior cerebellar artery   origins are normal  Normal vertebral basilar junction  BASILAR ARTERY:  Basilar artery is normal in caliber  Normal superior cerebellar arteries  POSTERIOR CEREBRAL ARTERIES: There is fetal origin of the right posterior cerebral artery  The left posterior cerebral artery arises from the basilar tip  Both demonstrate no focal stenosis  Normal posterior communicating arteries       DURAL VENOUS SINUSES:  Normal  NON VASCULAR ANATOMY  BONY STRUCTURES:  No acute osseous abnormality  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  There are moderate multilevel spondylotic degenerative changes of the cervical spine  There is fusion of the C5 and C6 vertebral bodies  There is grade 1 anterolisthesis of C4 on C5 and C7 on T1  SOFT TISSUES OF THE NECK:  Normal      THORACIC INLET:  Unremarkable  IMPRESSION:     No acute intracranial abnormality  No focal stenosis or saccular aneurysm within the [place]  Moderate atherosclerotic calcific plaquing of the right carotid bulb causing approximately 60% stenosis by NASCET criteria  Severe stenosis at the origin of the left vertebral artery with a long segment of no detectable flow involving the V1 and proximal to mid V2 segments  These findings are age indeterminant  No prior studies are available for comparison       Workstation performed: [id number]

## 2021-09-02 NOTE — PHYSICAL THERAPY NOTE
PHYSICAL THERAPY EVALUATION  NAME: Gregory Terry  AGE:   80 y o  MRN:  [id number]  ADMIT DX: Dizziness [R42]    PMH:   Past Medical History:   Diagnosis Date    BPH with obstruction/lower urinary tract symptoms     Cataract     Dizziness     Ear problems     Elevated PSA     Hypercholesterolemia     Hypertension     Poor urinary stream      LENGTH OF STAY: 0       21 0904   PT Last Visit   PT Visit Date 21   Note Type   Note type Evaluation   Pain Assessment   Pain Assessment Tool Pain Assessment not indicated - pt denies pain   Pain Score No Pain   Home Living   Type of Home House   Home Layout Two level;Stairs to enter with rails  (bi-level with 6+ 7 steps to main living floor)   Bathroom Shower/Tub Tub/shower unit   Bathroom Toilet Standard   Bathroom Equipment Grab bars in [address]   Additional Comments Ambulates independently without AD at baseline  Prior Function   Level of [place] Independent with ADLs and functional mobility   Lives With Spouse   ADL Assistance Independent   IADLs Independent   Falls in the last 6 months 0   Vocational Retired   Comments limited driving   Restrictions/Precautions   [place] Bearing Precautions Per Order No   Other Precautions Multiple lines;Telemetry; Fall Risk  (dizziness)   General   Family/Caregiver Present No   Cognition   Overall Cognitive Status WFL   Arousal/Participation Cooperative   Orientation Level Oriented X4   Memory Within functional limits   Following Commands Follows one step commands without difficulty   Comments Pt identified by name and   RLE Assessment   RLE Assessment X   Strength RLE   RLE Overall Strength 4/5  (functionally)   LLE Assessment   LLE Assessment X   Strength LLE   LLE Overall Strength 4/5  (functionally)   Bed Mobility   Supine to Sit 5  Supervision   Additional items HOB elevated; Increased time required;Verbal cues   Sit to Supine 5  Supervision   Additional items Increased time required;Verbal cues   Transfers   Sit to Stand 5  Supervision   Additional items Increased time required;Verbal cues   Stand to Sit 5  Supervision   Additional items Increased time required;Verbal cues   Ambulation/Elevation   Gait pattern Improper Weight shift;Decreased foot clearance; Short stride; Excessively slow  ((+) path deviation)   Gait Assistance 5  Supervision  (x1 significant LOB during a quick turn; min A from PT)   Additional items Assist x 1;Verbal cues   Assistive Device None   Distance 60` x 2   Balance   Static Sitting Good   Static Standing Fair +   Dynamic Standing Fair   Ambulatory Fair -   Endurance Deficit   Endurance Deficit Yes   Endurance Deficit Description mild c/o dizziness/lightheadedness   Activity Tolerance   Activity Tolerance Patient tolerated treatment well   Nurse Made Aware Per RN, pt appropriate to evaluate   Assessment   Prognosis Fair   Problem List Decreased strength;Decreased endurance; Impaired balance;Decreased mobility; Decreased coordination; Impaired judgement;Decreased safety awareness   Goals   Patient Goals to go home   STG Expiration Date 09/12/21   Short Term Goal #1 Pt will be able to: (1) perform bed mobility with mod I to decrease caregiver burden (2) perform sit to stand with mod I to increase level of independence and promote safe toiletting and transfers (3) ambulate at least 200` with mod I and least restrictive AD to increase activity tolerance and allow for safe community mobilization (4) increase standing balance by 1 grade to decrease risk of falls (5) negotiate at least 13 stairs with supervision and use of handrail to allow safe access to main living floor   PT Treatment Day 1   Plan   Treatment/Interventions Functional transfer training;LE strengthening/ROM; Therapeutic exercise;Elevations; Endurance training;Patient/family training;Equipment eval/education; Bed mobility;Gait training   PT Frequency Other (Comment)  (3-5x/week)   Recommendation   PT Discharge Recommendation Home with outpatient rehabilitation  (for high level balance/LE strengthening)   Equipment Recommended Cane   AM-PAC Basic Mobility Inpatient   Turning in Bed Without Bedrails 4   Lying on Back to Sitting on Edge of Flat Bed 3   Moving Bed to Chair 3   Standing Up From Chair 3   Walk in Room 3   Climb 3-5 Stairs 3   Basic Mobility Inpatient Raw Score 19   Basic Mobility Standardized Score 42 48   Barthel Index   Feeding 10   Bathing 0   Grooming Score 5   Dressing Score 5   Bladder Score 10   Bowels Score 10   Toilet Use Score 5   Transfers (Bed/Chair) Score 10   Mobility (Level Surface) Score 10   Stairs Score 0   Barthel Index Score 65   The patient's AM-PAC Basic Mobility Inpatient Short Form Raw Score is 19, Standardized Score is 42 48  A standardized score less than 42 9 suggests the patient may benefit from discharge to post-acute rehabilitation services  Please also refer to the recommendation of the Physical Therapist for safe discharge planning  PT currently recommending OPPT and family support as pt is close to his functional baseline and progress is expected  Assessment: Pt is a 80 y o  male seen for PT evaluation s/p admit to [address] [place] [address] on 9/2/2021 w/ Vertebral artery stenosis  Order placed for PT  Comorbidities affecting pt's physical performance at time of assessment include: HTN, limited vision and dizziness  Personal factors affecting pt at time of IE include: multi-level environment, advanced age, inability to perform IADLs and inability to perform ADLs  Prior to admission, pt was was independent w/ all functional mobility w/ out AD, ambulated community distances and elevations, lived in multi-level home and lived with wife  Upon evaluation: Pt requires supervision for bed mobility, supervision for sit to stand, and supervision for ambulation with x1 LOB requiring min A from PT     (Please find full objective findings from PT assessment regarding body systems outlined above)  Impairments and limitations also listed above, especially due to  weakness, impaired balance, decreased endurance, gait deviations, decreased activity tolerance, decreased safety awareness and fall risk  The following objective measures performed on IE also reveal limitations: Barthel Index 65/100  Pt's clinical presentation is currently unstable/unpredictable seen in pt's presentation of decreased safety awareness, fall risk, and dizziness with mobility/(+) LOB  Pt to benefit from continued skilled PT tx while in hospital and upon DC to address deficits as defined above and maximize level of functional mobility  From PT/mobility standpoint, recommendation at time of d/c would be OP PT, home with family support and with cane pending progress  Recommend progression of ambulation and initiation of HEP/stair negotiation as appropriate  [facility], PT,DPT    Physical Therapy Treatment:    Time in: 0912  Time out: 0926  Total time: 14 minutes    S: Pt agrees to PT treatment and is pleasant and cooperative throughout session  Initially declining use of SPC, however eventually reports liking use of cane during ambulation  O: Education provided on use and sequencing of SPC with hand over hand instruction  Pt able to ambulate an additional 60` x2 with supervision and use of SPC  Good carry over for sequencing instruction  No overt LOB noted  Education also provided on decreasing speed of turns for safety  A: Recommending continued use of SPC for mobility at this time until dizziness resolves  Will continue to benefit from ongoing skilled PT to maximize his functional mobility and increase his level of independence  P: Anticipating pt will be able to go home with family support, OPPT, and use of SPC at time of discharge      [facility], PT,DPT

## 2021-09-02 NOTE — ASSESSMENT & PLAN NOTE
· Last lipid panel in January with only elevated TG   · Will check new lipid panel   · High dose statin for now

## 2021-09-02 NOTE — ASSESSMENT & PLAN NOTE
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

## 2021-09-02 NOTE — ASSESSMENT & PLAN NOTE
· Presents with dizziness for which he has been evaluated by ENT in the past   · Recent MRI  3/14/21 with IAC w/wo contrast unremarkable   · 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  · Follows with outpatient ENT   · Up with assitance to prevent falls   · Continue with PRN meclizine   · Possibly secondary to CTA results?

## 2021-09-02 NOTE — H&P
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  Currently being treat at PT for dizziness  · MRI 3/14/21 w/wo contrast unremarkable  No vascular abnormality   · ED discussed with on call vacular fellow who recommended admission with vascular surgery consult  · Appreciate vascular recommendations   · Given his worsening dizziness and balance issues acutely CVA pathway   · Will place him on stroke pathway for now  · MRI   · Echo    · Neuro consult pending MRI result     Dizziness  Assessment & Plan  · Presents with dizziness for which he has been evaluated by ENT in the past   · Recent MRI  3/14/21 with IAC w/wo contrast unremarkable   · 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  · Follows with outpatient ENT   · Up with assitance to prevent falls   · Continue with PRN meclizine   · Possibly secondary to CTA results? Raised TSH level  Assessment & Plan  · TSH 5 903  · No hx of hypothyroidism   · T4 pending     Essential hypertension  Assessment & Plan  · Pressures mildly elevated   · Hold on bp meds to allow cerebral perfusion until mri resulted     Mixed hyperlipidemia  Assessment & Plan  · Last lipid panel in January with only elevated TG   · Will check new lipid panel   · High dose statin for now       VTE Pharmacologic Prophylaxis: VTE Score: 4 Moderate Risk (Score 3-4) - Pharmacological DVT Prophylaxis Ordered: heparin  Code Status: Prior full code   Discussion with family: Patient declined call to        Anticipated Length of Stay: Patient will be admitted on an observation basis with an anticipated length of stay of less than 2 midnights secondary to vertebral artery stenosis   Total Time for Visit, including Counseling / Coordination of Care: 70 minutes Greater than 50% of this total time spent on direct patient counseling and coordination of care  Chief Complaint:  Dizziness    History of Present Illness:  Gregory Terry is a 80 y o  male with a PMH of hyperlipidemia, hypertension who presents with vertebral artery stenosis  Patient  presents to the emergency department for acute onset of severe dizziness occurring tonight  He was having associated difficulty with ambulation secondary to dizziness  Patient has been worked up for dizziness in the past and has 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  Patient states that he was seeing physical therapy but was intermittently dizzy  He states that the dizziness tonight was worse than he has ever experienced  He had no associated nausea or vomiting  He had no weakness, tingling, numbness, headaches, or other associated neurologic changes  He did state that he was having some changes in vision but associated this with a change in his lens prescription that he underwent recently  CTA done of the head and neck in the emergency department such severe stenosis of the origin of the left vertebral artery with long segment of no detectable flow involving the V1, and proximal to mid V2 segments  These findings were not seen on previous MRI done in April by outpatient ENT  ED provider did discuss case with on-call vascular surgery fellow who recommended patient be admitted with vascular surgery consultation  Review of Systems:  Review of Systems   Constitutional: Negative for chills, fatigue, fever and unexpected weight change  HENT: Positive for hearing loss  Eyes: Positive for visual disturbance  Respiratory: Negative for cough, chest tightness and shortness of breath  Cardiovascular: Negative for chest pain and palpitations  Gastrointestinal: Positive for constipation  Negative for abdominal pain, diarrhea, nausea and vomiting  Genitourinary: Negative for decreased urine volume, dysuria, frequency and urgency  Musculoskeletal: Negative for arthralgias  Neurological: Positive for dizziness  Negative for seizures, syncope, facial asymmetry, speech difficulty, weakness, light-headedness, numbness and headaches  All other systems reviewed and are negative  Past Medical and Surgical History:   Past Medical History:   Diagnosis Date    BPH with obstruction/lower urinary tract symptoms     Cataract     Dizziness     Ear problems     Elevated PSA     Hypercholesterolemia     Hypertension     Poor urinary stream        Past Surgical History:   Procedure Laterality Date    CATARACT EXTRACTION      KNEE SURGERY  2005    PROSTATE BIOPSY  2005    ROTATOR CUFF REPAIR  06/15/2015       Meds/Allergies:  Prior to Admission medications    Medication Sig Start Date End Date Taking?  Authorizing Provider   aspirin 81 MG tablet Daily   Yes Historical Provider, MD   atorvastatin (LIPITOR) 10 mg tablet Take 10 mg by mouth daily at bedtime 4/7/18  Yes Historical Provider, MD   Cholecalciferol (VITAMIN D3) 2000 units capsule Daily   Yes Historical Provider, MD   lisinopril (ZESTRIL) 20 mg tablet  2/12/21  Yes Historical Provider, MD   RESTASIS 0 05 % ophthalmic emulsion INSTILL 1 DROP INTO BOTH EYES TWICE A DAY 6/11/20  Yes Historical Provider, MD   meclizine (ANTIVERT) 12 5 MG tablet Take 12 5 mg by mouth 3 (three) times a day as needed  Patient not taking: Reported on 6/22/2021 1/11/21   Historical Provider, MD   ondansetron (ZOFRAN-ODT) 4 mg disintegrating tablet Take 1 tablet (4 mg total) by mouth every 6 (six) hours as needed for nausea or vomiting  Patient not taking: Reported on 6/22/2021 7/10/18   Maria Isabel Valerio MD   lisinopril (ZESTRIL) 10 mg tablet Take 10 mg by mouth daily  Patient not taking: Reported on 9/2/2021 4/7/18 9/2/21  Historical Provider, MD     I have reviewed home medications with patient personally  Allergies: No Known Allergies    Social History:  Marital Status: /Civil Union   Occupation: unknown   Patient Pre-hospital Living Situation: Home  Patient Pre-hospital Level of Mobility: walks  Patient Pre-hospital Diet Restrictions: none   Substance Use History:   Social History     Substance and Sexual Activity   Alcohol Use Yes    Alcohol/week: 14 0 standard drinks    Types: 14 Cans of beer per week    Comment: 1-2 beers a day     Social History     Tobacco Use   Smoking Status Former Smoker   Smokeless Tobacco Never Used     Social History     Substance and Sexual Activity   Drug Use No       Family History:  Family History   Problem Relation Age of Onset    Diabetes Mother     Heart disease Mother     Cancer Father     Diabetes Father        Physical Exam:     Vitals:   Blood Pressure: 155/76 (09/02/21 0400)  Pulse: 68 (09/02/21 0400)  Temperature: 98 7 °F (37 1 °C) (09/02/21 0058)  Temp Source: Oral (09/02/21 0058)  Respirations: 18 (09/02/21 0200)  Height: 5' 9" (175 3 cm) (09/02/21 0058)  Weight - Scale: 81 5 kg (179 lb 10 8 oz) (09/02/21 0058)  SpO2: 97 % (09/02/21 0400)    Physical Exam  Constitutional:       General: He is not in acute distress  HENT:      Head: Normocephalic and atraumatic  Mouth/Throat:      Mouth: Mucous membranes are moist       Pharynx: Oropharynx is clear  No oropharyngeal exudate  Eyes:      General:         Right eye: No discharge  Left eye: No discharge  Conjunctiva/sclera: Conjunctivae normal       Pupils: Pupils are equal, round, and reactive to light  Cardiovascular:      Rate and Rhythm: Normal rate and regular rhythm  Pulses: Normal pulses  Heart sounds: Normal heart sounds  No murmur heard  Pulmonary:      Effort: Pulmonary effort is normal  No respiratory distress  Breath sounds: Normal breath sounds   No wheezing or rales    Abdominal:      General: Abdomen is flat  There is no distension  Palpations: Abdomen is soft  Tenderness: There is no abdominal tenderness  Musculoskeletal:         General: Normal range of motion  Cervical back: Neck supple  No muscular tenderness  Right lower leg: No edema  Left lower leg: No edema  Skin:     General: Skin is warm and dry  Capillary Refill: Capillary refill takes less than 2 seconds  Coloration: Skin is not jaundiced  Neurological:      General: No focal deficit present  Mental Status: He is alert and oriented to person, place, and time  Cranial Nerves: No cranial nerve deficit  Motor: No weakness  Psychiatric:         Mood and Affect: Mood normal           Additional Data:     Lab Results:  Results from last 7 days   Lab Units 09/02/21  0132   WBC Thousand/uL 6 59   HEMOGLOBIN g/dL 15 6   HEMATOCRIT % 47 2   PLATELETS Thousands/uL 203   NEUTROS PCT % 46   LYMPHS PCT % 41   MONOS PCT % 10   EOS PCT % 2     Results from last 7 days   Lab Units 09/02/21  0132   SODIUM mmol/L 137   POTASSIUM mmol/L 4 4   CHLORIDE mmol/L 103   CO2 mmol/L 24   BUN mg/dL 15   CREATININE mg/dL 1 27   ANION GAP mmol/L 10   CALCIUM mg/dL 9 1   ALBUMIN g/dL 3 7   TOTAL BILIRUBIN mg/dL 0 93   ALK PHOS U/L 75   ALT U/L 38   AST U/L 34   GLUCOSE RANDOM mg/dL 110     Results from last 7 days   Lab Units 09/02/21  0132   INR  1 07                   Imaging: Reviewed radiology reports from this admission including: CT head  CTA head and neck with and without contrast   ED Interpretation by Justin Mccracken PA-C (09/02 0345)   Whit Colon MD  [phone] 9/2/2021     Narrative & Impression  CTA NECK AND BRAIN WITH AND WITHOUT CONTRAST     INDICATION: Dizziness, non-specific  Dizziness, persistent/recurrent, cardiac or vascular cause suspected  Dizziness, worsening today patient reports difficulty ambulating earlier today    Patient verbalizes being asymptomatic at this time     COMPARISON:   CT temporal bones is April 10, 2021      TECHNIQUE:  Routine CT imaging of the Brain without contrast   Post contrast imaging was performed after administration of iodinated contrast through the neck and brain  Post contrast axial 0 625 mm images timed to opacify the arterial system        3D rendering was performed on an independent workstation  MIP reconstructions performed  Coronal reconstructions were performed of the noncontrast portion of the brain        Radiation dose length product (DLP) for this visit:  4090 mGy-cm   This examination, like all CT scans performed in the [facility], w   as performed utilizing techniques to minimize radiation dose exposure, including the use of iterative   reconstruction and automated exposure control         IV Contrast:  100 mL of iohexol (OMNIPAQUE)     IMAGE QUALITY:   Diagnostic     FINDINGS:  NONCONTRAST BRAIN  PARENCHYMA:  No intracranial mass, mass effect or midline shift  No CT signs of acute infarction  No acute parenchymal hemorrhage      VENTRICLES AND EXTRA-AXIAL SPACES:  Normal for the patient's age      VISUALIZED ORBITS AND PARANASAL SINUSES:  Unremarkable      CERVICAL VASCULATURE  AORTIC ARCH AND GREAT VESSELS:  Normal aortic arch and great vessel origins  Normal visualized subclavian vessels      RIGHT VERTEBRAL ARTERY CERVICAL SEGMENT:  Normal origin  The vessel is normal in caliber throughout the neck      LEFT VERTEBRAL ARTERY CERVICAL SEGMENT: There is severe stenosis at the origin of the left vertebral artery  There is no flow noted within the distal V1 segment  There is detectable flow originating at the mi   d V2 segment at the C3 vertebral body level  The remainder of the left vertebral artery demonstrates enhancement      RIGHT EXTRACRANIAL CAROTID SEGMENT:  Moderate atherosclerotic disease of the bifurcation causing approximately 60% stenosis by NASCET criteria    The remainder the cervical segment is normal in course and caliber          LEFT EXTRACRANIAL CAROTID SEGMENT: Mild atherosclerotic disease of the distal common carotid artery and proximal cervical internal carotid artery without significant stenosis compared to the more distal ICA      NASCET criteria was used to determine the degree of internal carotid artery diameter stenosis        INTRACRANIAL VASCULATURE   INTERNAL CAROTID ARTERIES:  Normal enhancement of the intracranial portions of the internal carotid arteries  There is mild atherosclerotic calcific plaquing of both cavernous segments  Normal ophthalmic artery origins  Normal ICA terminus       ANTERIOR CIRCULATION:  Symmetric A1 segments and anterior cerebral a   rteries with normal enhancement  Normal anterior communicating artery      MIDDLE CEREBRAL ARTERY CIRCULATION:  M1 segment and middle cerebral artery branches demonstrate normal enhancement bilaterally      DISTAL VERTEBRAL ARTERIES:  There is also irregularity with areas of stenosis involving the distal left vertebral artery, likely related to atherosclerotic disease  The distal right vertebral artery is unremarkable  Posterior inferior cerebellar artery   origins are normal  Normal vertebral basilar junction      BASILAR ARTERY:  Basilar artery is normal in caliber  Normal superior cerebellar arteries      POSTERIOR CEREBRAL ARTERIES: There is fetal origin of the right posterior cerebral artery  The left posterior cerebral artery arises from the basilar tip  Both demonstrate no focal stenosis  Normal posterior communicating arteries      DURAL VENOUS SINUSES:  Normal         NON VASCULAR ANATOMY  BONY STRUCTURES:  No acute osseous abnormality  Again noted is right superior    semicircular canal dehiscence  There are moderate multilevel spondylotic degenerative changes of the cervical spine  There is fusion of the C5 and C6 vertebral bodies      There is grade 1 anterolisthesis of C4 on C5 and C7 on T1      SOFT TISSUES OF THE NECK:  Normal      THORACIC INLET:  Unremarkable         IMPRESSION:     No acute intracranial abnormality      No focal stenosis or saccular aneurysm within the [place]      Moderate atherosclerotic calcific plaquing of the right carotid bulb causing approximately 60% stenosis by NASCET criteria      Severe stenosis at the origin of the left vertebral artery with a long segment of no detectable flow involving the V1 and proximal to mid V2 segments  These findings are age indeterminant  No prior studies are available for comparison            Workstation performed: [id number]        Final Result by Sonia Rodriguez MD (09/02 7476)      No acute intracranial abnormality  No focal stenosis or saccular aneurysm within the [place]  Moderate atherosclerotic calcific plaquing of the right carotid bulb causing approximately 60% stenosis by NASCET criteria  Severe stenosis at the origin of the left vertebral artery with a long segment of no detectable flow involving the V1 and proximal to mid V2 segments  These findings are age indeterminant  No prior studies are available for comparison  Workstation performed: [id number]         XR chest 1 view portable   ED Interpretation by Efra Cho PA-C (09/02 2465)   No acute cardiopulmonary disease on initial read          EKG and Other Studies Reviewed on Admission:   · EKG: NSR  HR 66  LAD   ** Please Note: This note has been constructed using a voice recognition system   **

## 2021-09-03 ENCOUNTER — APPOINTMENT (OUTPATIENT)
Dept: VASCULAR ULTRASOUND | Facility: HOSPITAL | Age: 86
End: 2021-09-03
Payer: COMMERCIAL

## 2021-09-03 VITALS
HEART RATE: 76 BPM | BODY MASS INDEX: 26.61 KG/M2 | DIASTOLIC BLOOD PRESSURE: 69 MMHG | SYSTOLIC BLOOD PRESSURE: 143 MMHG | OXYGEN SATURATION: 97 % | WEIGHT: 179.68 LBS | TEMPERATURE: 98.3 F | HEIGHT: 69 IN | RESPIRATION RATE: 18 BRPM

## 2021-09-03 PROBLEM — I65.29 CAROTID STENOSIS: Status: ACTIVE | Noted: 2021-09-03

## 2021-09-03 PROBLEM — R42 DIZZINESS: Status: RESOLVED | Noted: 2021-09-02 | Resolved: 2021-09-03

## 2021-09-03 PROCEDURE — 93880 EXTRACRANIAL BILAT STUDY: CPT

## 2021-09-03 PROCEDURE — 97166 OT EVAL MOD COMPLEX 45 MIN: CPT

## 2021-09-03 PROCEDURE — 93880 EXTRACRANIAL BILAT STUDY: CPT | Performed by: SURGERY

## 2021-09-03 PROCEDURE — 99217 PR OBSERVATION CARE DISCHARGE MANAGEMENT: CPT | Performed by: INTERNAL MEDICINE

## 2021-09-03 RX ORDER — CLOPIDOGREL BISULFATE 75 MG/1
75 TABLET ORAL DAILY
Qty: 30 TABLET | Refills: 0 | Status: SHIPPED | OUTPATIENT
Start: 2021-09-03 | End: 2022-07-28

## 2021-09-03 RX ORDER — CLOPIDOGREL BISULFATE 75 MG/1
75 TABLET ORAL DAILY
Status: DISCONTINUED | OUTPATIENT
Start: 2021-09-03 | End: 2021-09-03 | Stop reason: HOSPADM

## 2021-09-03 RX ORDER — POLYETHYLENE GLYCOL 3350 17 G/17G
17 POWDER, FOR SOLUTION ORAL DAILY PRN
Status: DISCONTINUED | OUTPATIENT
Start: 2021-09-03 | End: 2021-09-03 | Stop reason: HOSPADM

## 2021-09-03 RX ORDER — ATORVASTATIN CALCIUM 40 MG/1
40 TABLET, FILM COATED ORAL
Qty: 30 TABLET | Refills: 0 | Status: SHIPPED | OUTPATIENT
Start: 2021-09-03

## 2021-09-03 RX ORDER — LISINOPRIL 20 MG/1
20 TABLET ORAL DAILY
Status: DISCONTINUED | OUTPATIENT
Start: 2021-09-03 | End: 2021-09-03 | Stop reason: HOSPADM

## 2021-09-03 RX ADMIN — CLOPIDOGREL BISULFATE 75 MG: 75 TABLET ORAL at 11:31

## 2021-09-03 RX ADMIN — MECLIZINE 12.5 MG: 12.5 TABLET ORAL at 15:45

## 2021-09-03 RX ADMIN — HEPARIN SODIUM 5000 UNITS: 5000 INJECTION INTRAVENOUS; SUBCUTANEOUS at 04:42

## 2021-09-03 RX ADMIN — Medication 1 DROP: at 07:53

## 2021-09-03 RX ADMIN — POLYETHYLENE GLYCOL 3350 17 G: 17 POWDER, FOR SOLUTION ORAL at 09:06

## 2021-09-03 RX ADMIN — ASPIRIN 81 MG: 81 TABLET, COATED ORAL at 07:53

## 2021-09-03 NOTE — DISCHARGE INSTR - AVS FIRST PAGE
Dear Sury Swanson,     It was our pleasure to care for you here at [facility], [address]  It is our hope that we were always able to exceed the expected standards for your care during your stay  You were hospitalized due to dizziness  You were cared for on the 3rd floor by Nichol Chaparro MD under the service of Jenny Jeffers, DO with the [facility] who covers for your primary care physician (PCP), Dustin Sewell MD, while you were hospitalized  If you have any questions or concerns related to this hospitalization, you may contact us at [phone]  For follow up as well as any medication refills, we recommend that you follow up with your primary care physician  A registered nurse will reach out to you by phone within a few days after your discharge to answer any additional questions that you may have after going home  However, at this time we provide for you here, the most important instructions / recommendations at discharge:     · Notable Medication Adjustments -   · Plavix was added per vascular surgery recommendations  This medication with the aspirin will help prevent any plaque formation with maintain blood flow  Most common side effect is more bleeding therefore advised to be careful walking or changing position and avoid any trauma as possible  · ER atorvastatin dose was increased to 40 mg daily  · Testing Required after Discharge -   · None  · Important follow up information -   · Follow-up with your primary care doctor within 1 week  · Follow-up with vascular surgery for the narrowing of your carotid vessel  · Other Instructions -   · Refer to discharge instructions for further details  Results of imaging was provided    · Please review this entire after visit summary as additional general instructions including medication list, appointments, activity, diet, any pertinent wound care, and other additional recommendations from your care team that may be provided for you        Sincerely,     George Parikh MD

## 2021-09-03 NOTE — ASSESSMENT & PLAN NOTE
· Last lipid panel in January with only elevated TG   · Lipid panel within normal range    · Increased atorvastatin to 40 mg due to vascular disease

## 2021-09-03 NOTE — ASSESSMENT & PLAN NOTE
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

## 2021-09-03 NOTE — DISCHARGE SUMMARY
[facility]  Discharge- Shay Godoy 1935, 80 y o  male MRN: [id number]  Unit/Bed#: S -01 Encounter: [id number]  Primary Care Provider: Jaden Mccain MD   Date and time admitted to hospital: 9/2/2021 12:50 AM    * Vertebral artery stenosis  Assessment & Plan  · Presents with acute onset of dizziness for approx one day  Has been followed by outpt ENT for R superior semicircular canal dehiscence  Currently being treat at PT for dizziness  · MRI 3/14/21 w/wo contrast unremarkable  No vascular abnormality   · ED discussed with on call vacular fellow who recommended admission with vascular surgery consult  · Appreciate vascular recommendations:  No surgical intervention needed  · Stroke pathway:  Imaging negative for stroke  · PT/OT:  Outpatient PT    Right ICA stenosis  Assessment & Plan  Right ICA 60% stenosis noted on CTA head and neck on admission  Vascular surgery on board, appreciate recommendations:  Recommending dual anti-platelet therapy and statin with close follow-up with them in clinic  Patient had a baseline carotid ultrasound at the hospital and will continue to follow up with vascular in clinic    Raised TSH level  Assessment & Plan  · TSH 5 903  · No hx of hypothyroidism   · Free T4 normal  · Follow-up with primary care doctor who can facilitate repeat TSH in 4-6 weeks      Mixed hyperlipidemia  Assessment & Plan  · Last lipid panel in January with only elevated TG   · Lipid panel within normal range    · Increased atorvastatin to 40 mg due to vascular disease    Essential hypertension  Assessment & Plan  · Continue home meds    Dizziness-resolved as of 9/3/2021  Assessment & Plan  · Presents with dizziness for which he has been evaluated by ENT in the past   · Recent MRI  3/14/21 with IAC w/wo contrast unremarkable   · 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  · Follows with outpatient ENT   · Continue with PRN meclizine, can use up to 25 mg 3 times a day and advised to discuss with primary care doctor if no improvement  · Outpatient PT        Discharging Resident Physician: Juan Nielsen MD  Attending: Rach Nettles DO  PCP: Finesse Michael MD  Admission Date: 9/2/2021  Discharge Date: 09/03/21    Disposition:     Home    Reason for Admission:  Dizziness with vertebral and carotid arteries stenosis    Consultations During Hospital Stay:  · Vascular surgery    Procedures Performed:     · None    Significant Findings / Test Results:     CTA head and neck with and without contrast    Result Date: 9/2/2021  Impression: No acute intracranial abnormality  No focal stenosis or saccular aneurysm within the [place]  Moderate atherosclerotic calcific plaquing of the right carotid bulb causing approximately 60% stenosis by NASCET criteria  Severe stenosis at the origin of the left vertebral artery with a long segment of no detectable flow involving the V1 and proximal to mid V2 segments  These findings are age indeterminant  No prior studies are available for comparison  Workstation performed: [id number]     XR chest 1 view portable    Result Date: 9/2/2021  Impression: No acute cardiopulmonary disease  Workstation performed: [id number]     MRI brain wo contrast    Result Date: 9/2/2021  Impression: No acute intracranial abnormality  No evidence of recent infarct  Workstation performed: [id number]     Echo:  EF 68%, grade 1 diastolic dysfunction, no significant valvular abnormalities      Incidental Findings:   · None     Test Results Pending at Discharge (will require follow up): · Carotid ultrasound, vascular surgery will follow-up with patient about results in clinic     Outpatient Tests Requested:  · None    Complications:  None    Hospital Course:     Terry Crabtree is a 80 y o  male patient who originally presented to the hospital on 9/2/2021 due to dizziness that is worse than before with imbalance gait    Patient was admitted for stroke workup to rule out stroke/TIA  CTA head and neck done in the ED showing vertebral or artery stenosis and right ICA 60% stenosis  All imaging were negative for stroke or hemorrhage  Echo as part of stroke pathway showed EF of 60% with no significant valvular abnormalities  Due to the concerning imaging findings, vascular surgery was consulted  No intervention was recommended and recommendations were for medical management and close follow-up with vascular surgery in clinic  Per vascular surgery recommendations, patient was started on Plavix and also his statin was increased to 40 mg atorvastatin on admission  Plan is to treat with dual anti-platelet and statin with close follow-up with vascular surgery  Baseline carotid ultrasound was obtained and patient to follow-up with vascular surgery about the results  Patient's dizziness has improved however he still has some residual occasional dizziness and therefore physical therapy worked with him who recommended outpatient physical therapy and we advised the patient to increase his meclizine to 25 mg up to 3 times a day in to contact his primary care doctor if his dizziness did not improve  Patient was advised to follow-up with his primary care doctor in 1 week  Patient and family agreeable to the discharge plan as he is medically stable for discharge today  Condition at Discharge: stable     Discharge Day Visit / Exam:     Subjective:  Patient denies any complaints other than occasional dizziness however it is weight improved compared to the day of admission  Vitals: Blood Pressure: 143/69 (09/03/21 0700)  Pulse: 76 (09/03/21 0700)  Temperature: 98 3 °F (36 8 °C) (09/03/21 0700)  Temp Source: Oral (09/03/21 0700)  Respirations: 18 (09/03/21 0700)  Height: 5' 9" (175 3 cm) (09/02/21 0058)  Weight - Scale: 81 5 kg (179 lb 10 8 oz) (09/02/21 0058)  SpO2: 97 % (09/03/21 0700)  Exam:   Physical Exam  Vitals and nursing note reviewed     Constitutional: General: He is not in acute distress  Appearance: He is not ill-appearing or diaphoretic  HENT:      Head: Normocephalic and atraumatic  Mouth/Throat:      Mouth: Mucous membranes are moist       Pharynx: Oropharynx is clear  Eyes:      General: No scleral icterus  Extraocular Movements: Extraocular movements intact  Conjunctiva/sclera: Conjunctivae normal    Neck:      Vascular: No carotid bruit  Cardiovascular:      Rate and Rhythm: Normal rate and regular rhythm  Heart sounds: No murmur heard  Pulmonary:      Effort: Pulmonary effort is normal  No respiratory distress  Breath sounds: Normal breath sounds  No wheezing or rales  Abdominal:      General: Bowel sounds are normal       Palpations: Abdomen is soft  Tenderness: There is no abdominal tenderness  Musculoskeletal:      Cervical back: Normal range of motion and neck supple  Right lower leg: No edema  Left lower leg: No edema  Skin:     General: Skin is warm and dry  Neurological:      General: No focal deficit present  Mental Status: He is alert and oriented to person, place, and time  Cranial Nerves: No cranial nerve deficit  Sensory: No sensory deficit  Motor: No weakness  Psychiatric:         Mood and Affect: Mood normal          Behavior: Behavior normal        Discussion with Family:  Discussed with patient's daughter at bedside and all questions answered    Discharge instructions/Information to patient and family:   See after visit summary for information provided to patient and family  Provisions for Follow-Up Care:  See after visit summary for information related to follow-up care and any pertinent home health orders  Planned Readmission:  None     Discharge Medications:  See after visit summary for reconciled discharge medications provided to patient and family        ** Please Note: This note has been constructed using a voice recognition system **

## 2021-09-03 NOTE — ASSESSMENT & PLAN NOTE
· Presents with acute onset of dizziness for approx one day  Has been followed by outpt ENT for R superior semicircular canal dehiscence  Currently being treat at PT for dizziness  · MRI 3/14/21 w/wo contrast unremarkable  No vascular abnormality   · ED discussed with on call vacular fellow who recommended admission with vascular surgery consult    · Appreciate vascular recommendations:  No surgical intervention needed  · Stroke pathway:  Imaging negative for stroke  · PT/OT:  Outpatient PT

## 2021-09-03 NOTE — UTILIZATION REVIEW
Initial Clinical Review    Admission: Date/Time/Statement:  9/2/2021 0446 Observation   Admission Orders (From admission, onward)     Ordered        09/02/21 0446  Place in Observation  Once                   Orders Placed This Encounter   Procedures    Place in Observation     Standing Status:   Standing     Number of Occurrences:   1     Order Specific Question:   Level of Care     Answer:   Med Surg [16]     ED Arrival Information     Expected Arrival Acuity    - 9/2/2021 00:50 Urgent         Means of arrival Escorted by Service Admission type    Ambulance [facility] Hospitalist Urgent         Arrival complaint    Dizzienss        Chief Complaint   Patient presents with    Dizziness     patient states he has dizziness all day, and at this moment denies dizzines, vision changes, nausea, vomiting, diarrhea, chest pain, shortness of breah or headache  Initial Presentation:  this is a 80year old male from home to ED via ems admitted to observation due to dizziness/vertebral artery stenosis  History of dizziness and follows with ENT/PT   Presented due to worsening swaying dizziness starting day prior to arrival   Earlier in day, episode of watery  diarrhea  On exam strength 4/5 upper and lower extremities  NIH stroke score 0  TSH 5 903  cta head and neck showed severe stenosis at left vertebral artery  In the ED given 1 liter IVF  Plan is stoke pathway:  Check MRI, echo, neuro checks  Consult vascular  9/2/2021 per vascular:  Patient with ongoing dizziness starting several weeks ago, has occulusion of left vertebral artery and suspect acute on chronic due to atherosclerosis  Recommend dual antiplatelet and statin  Check carotid doppler  No surgical intervention at this time         ED Triage Vitals [09/02/21 0058]   Temperature Pulse Respirations Blood Pressure SpO2   98 7 °F (37 1 °C) 72 18 (!) 181/84 99 %      Temp Source Heart Rate Source Patient Position - Orthostatic VS BP Location FiO2 (%)   Oral Monitor Lying Left arm --      Pain Score       No Pain          Wt Readings from Last 1 Encounters:   09/02/21 81 5 kg (179 lb 10 8 oz)     Additional Vital Signs:   09/03/21 0700  98 3 °F (36 8 °C)  76  18  143/69  --  97 %  None (Room air)  Lying   09/03/21 0300  98 3 °F (36 8 °C)  76  16  122/63  --  97 %  None (Room air)  Lying   09/03/21 0100  98 6 °F (37 °C)  75  16  124/62  --  97 %  None (Room air)  Lying   09/02/21 2300  98 2 °F (36 8 °C)  78  18  117/60  --  96 %  None (Room air)  Lying   09/02/21 2100  98 2 °F (36 8 °C)  77  16  118/57  81  95 %  None (Room air)         09/02/21 0400  --  68  --  155/76  107  97 %  --  --   09/02/21 0202  --  --  --  174/89Abnormal   --  --  --  --   09/02/21 0200  --  68  18  174/89Abnormal   124  99 %  None (Room air)         Pertinent Labs/Diagnostic Test Results:   9/2/2021 MRI brain No acute intracranial abnormality   No evidence of recent infarct  9/2/2021 ct head and neck - No acute intracranial abnormality  No focal stenosis or saccular aneurysm within the [place]  Moderate atherosclerotic calcific plaquing of the right carotid bulb causing approximately 60% stenosis by NASCET criteria  Severe stenosis at the origin of the left vertebral artery with a long segment of no detectable flow involving the V1 and proximal to mid V2 segments   These findings are age indeterminant   No prior studies are available for comparison  9/2/2021 CxR - No acute cardiopulmonary disease      9/2/2021 ecg Sinus rhythm with 1st degree A-V block   Otherwise normal ECG   When compared with ECG of 07-JUL-2018 07:52,   Right bundle branch block is no longer Present     Results from last 7 days   Lab Units 09/02/21  0539 09/02/21  0132   WBC Thousand/uL 8 81 6 59   HEMOGLOBIN g/dL 15 9 15 6   HEMATOCRIT % 48 5 47 2   PLATELETS Thousands/uL 223  223 203   NEUTROS ABS Thousands/µL  --  3 05     Results from last 7 days   Lab Units 09/02/21  0539 09/02/21  0132   SODIUM mmol/L 140 137   POTASSIUM mmol/L 4 4 4 4   CHLORIDE mmol/L 105 103   CO2 mmol/L 26 24   ANION GAP mmol/L 9 10   BUN mg/dL 13 15   CREATININE mg/dL 1 19 1 27   EGFR ml/min/1 73sq m 55 51   CALCIUM mg/dL 9 3 9 1   MAGNESIUM mg/dL  --  2 2     Results from last 7 days   Lab Units 09/02/21  0132   AST U/L 34   ALT U/L 38   ALK PHOS U/L 75   TOTAL PROTEIN g/dL 6 8   ALBUMIN g/dL 3 7   TOTAL BILIRUBIN mg/dL 0 93     Results from last 7 days   Lab Units 09/02/21  0539 09/02/21  0132   GLUCOSE RANDOM mg/dL 105 110     Results from last 7 days   Lab Units 09/02/21  0132   HEMOGLOBIN A1C % 5 9*   EAG mg/dl 123     Results from last 7 days   Lab Units 09/02/21  0201   TROPONIN I ng/mL <0 02     Results from last 7 days   Lab Units 09/02/21  0132   PROTIME seconds 14 1   INR  1 07   PTT seconds 30     Results from last 7 days   Lab Units 09/02/21  0132   TSH 3RD GENERATON uIU/mL 5 903*     Results from last 7 days   Lab Units 09/02/21  0140   CLARITY UA  Clear   COLOR UA  Yellow   SPEC GRAV UA  1 020   PH UA  5 5   GLUCOSE UA mg/dl Negative   KETONES UA mg/dl Negative   BLOOD UA  Trace*   PROTEIN UA mg/dl Negative   NITRITE UA  Negative   BILIRUBIN UA  Negative   UROBILINOGEN UA E U /dl 0 2   LEUKOCYTES UA  Negative   WBC UA /hpf 0-1   RBC UA /hpf 2-4   BACTERIA UA /hpf Occasional   EPITHELIAL CELLS WET PREP /hpf Occasional     ED Treatment:   Medication Administration from 09/02/2021 0050 to 09/02/2021 1547       Date/Time Order Dose Route Action Comments     09/02/2021 0133 sodium chloride 0 9 % bolus 1,000 mL 1,000 mL Intravenous New Bag      09/02/2021 0202 lisinopril (ZESTRIL) tablet 20 mg 20 mg Oral Given      09/02/2021 0228 atorvastatin (LIPITOR) tablet 10 mg 10 mg Oral Given      09/02/2021 0239 iohexol (OMNIPAQUE) 350 MG/ML injection (SINGLE-DOSE) 100 mL 100 mL Intravenous Given      09/02/2021 0900 aspirin (ECOTRIN LOW STRENGTH) EC tablet 81 mg 81 mg Oral Given      09/02/2021 9554 meclizine (ANTIVERT) tablet 12 5 mg 12 5 mg Oral Given      09/02/2021 1350 heparin (porcine) subcutaneous injection 5,000 Units 5,000 Units Subcutaneous Given         Past Medical History:   Diagnosis Date    BPH with obstruction/lower urinary tract symptoms     Cataract     Dizziness     Ear problems     Elevated PSA     Hypercholesterolemia     Hypertension     Poor urinary stream      Present on Admission:   Essential hypertension   Mixed hyperlipidemia      Admitting Diagnosis: Dizziness [R42]  Elevated TSH [R79 89]  Stenosis of left vertebral artery [I65 02]  Age/Sex: 80 y o  male  Admission Orders:  Scheduled Medications:  artificial tear, , Both Eyes, HS  aspirin, 81 mg, Oral, Daily  atorvastatin, 40 mg, Oral, HS  clopidogrel, 75 mg, Oral, Daily  cycloSPORINE, 1 drop, Ophthalmic, BID  heparin (porcine), 5,000 Units, Subcutaneous, Q8H LAURA  lisinopril, 20 mg, Oral, Daily  senna, 2 tablet, Oral, HS      Continuous IV Infusions: none      PRN Meds:  meclizine, 12 5 mg, Oral, Q8H PRN - used x 2 9/2    Telemetry   Neuro checks Every 1 hour x 4 hours, then every 2 hours x 8 hours, then every 4 hours x 72 hours    IP CONSULT TO VASCULAR SURGERY    Network Utilization Review Department  ATTENTION: Please call with any questions or concerns to [phone] and carefully listen to the prompts so that you are directed to the right person  All voicemails are confidential   Carissa Mena all requests for admission clinical reviews, approved or denied determinations and any other requests to dedicated fax number below belonging to the campus where the patient is receiving treatment   List of dedicated fax numbers for the Facilities:  [address] [place] [address] DENIALS (Administrative/Medical Necessity) [phone]   [address] [place] [address] (Maternity/NICU/Pediatrics) [address] [facility] Nino Sanchez [address] [id number] [place] [address] Latasha Herron [address] [place] [address] [phone]

## 2021-09-03 NOTE — ASSESSMENT & PLAN NOTE
· TSH 5 903  · No hx of hypothyroidism   · Free T4 normal  · Follow-up with primary care doctor who can facilitate repeat TSH in 4-6 weeks

## 2021-09-03 NOTE — ASSESSMENT & PLAN NOTE
Right ICA 60% stenosis noted on CTA head and neck on admission  Vascular surgery on board, appreciate recommendations:  Recommending dual anti-platelet therapy and statin with close follow-up with them in clinic    Patient had a baseline carotid ultrasound at the hospital and will continue to follow up with vascular in clinic

## 2021-09-03 NOTE — OCCUPATIONAL THERAPY NOTE
Occupational Therapy Evaluation     Patient Name: Alina Ny  XLOAI'F Date: 9/3/2021  Problem List  Principal Problem:    Vertebral artery stenosis  Active Problems:    Essential hypertension    Mixed hyperlipidemia    Raised TSH level    Past Medical History  Past Medical History:   Diagnosis Date    BPH with obstruction/lower urinary tract symptoms     Cataract     Dizziness     Ear problems     Elevated PSA     Hypercholesterolemia     Hypertension     Poor urinary stream      Past Surgical History  Past Surgical History:   Procedure Laterality Date    CATARACT EXTRACTION      KNEE SURGERY  2005    PROSTATE BIOPSY  2005    ROTATOR CUFF REPAIR  06/15/2015           09/03/21 0957   OT Last Visit   OT Visit Date 09/03/21   Note Type   Note type Evaluation   Restrictions/Precautions   Weight Bearing Precautions Per Order No   Pain Assessment   Pain Assessment Tool Pain Assessment not indicated - pt denies pain   Pain Score No Pain   Home Living   Type of Home House   Home Layout Two level;Stairs to enter with rails  (bi level 6+7 steps to main level)   Bathroom Shower/Tub Tub/shower unit   Bathroom Toilet Standard   Bathroom Equipment Grab bars in shower   [address]   Additional Comments no use of AD at baseline   Prior Function   Level of [place] Independent with ADLs and functional mobility   Lives With [facility] Help From Family   ADL Assistance Independent   IADLs Independent   Falls in the last 6 months 0   Vocational Retired   Comments (+)driving   Lifestyle   Autonomy PTA pt living with wife in [facility], pt (I) with ADLs and IADLs, no use of AD at baseline, (-)falls, (+)drives    Reciprocal Relationships supportive daughter and wife   Service to Others retired   Intrinsic Gratification enjoys doing his exercises and taking laps in driveway   Subjective   Subjective "I just don't feel like I can go heel to toe with my eyes closed, I need to be able to"   ADL   Eating Assistance 7  [facility]   Supine to Sit 6  Modified independent   Additional Comments OOB and in chair at end of session   Transfers   Sit to Stand 6  Modified independent   Stand to Sit 6  Modified independent   Functional Mobility   Functional Mobility 6  Modified independent   Additional Comments with and without SPC, functional community distance   Additional items [facility]   Balance   Static Sitting Good   Dynamic Sitting Good   Static Standing Fair +   Dynamic Standing [address], [address]   Activity Tolerance   Activity Tolerance Patient tolerated treatment well   Medical Staff Made Aware RN Perry Gonzalez   RUE Assessment   RUE Assessment WFL   LUE Assessment   LUE Assessment WFL   Hand Function   Gross Motor Coordination Functional   Fine Motor Coordination Functional   Cognition   Overall Cognitive Status WFL   Arousal/Participation Alert; Cooperative   Attention Within functional limits   Orientation Level Oriented X4   Memory Within functional limits   Following Commands Follows all commands and directions without difficulty   Comments pleasant and cooperative   Assessment   Prognosis Good   Assessment Patient is a 80 y o  male admitted to [address] [place] [address] on 9/2/2021 due to Vertebral artery stenosis  Comorbidities affecting pt's physical performance at time of assessment include HTN, HLD  Patient has active OT orders and activity orders for Activity as tolerated  PTA pt living with wife in [facility], pt (I) with ADLs and IADLs, no use of AD at baseline, (-)falls, (+)drives  Personal factors affecting pt at time of IE include:steps to enter environment   At the time of evaluation patient currently requires (I) for UB ADLs, (I) for LB ADLs, (I) for functional transfers, and (I) for functional mobility  No further acute OT needs identified at this time  Recommend continued mobilization with hospital staff and restorative services while in the hospital to increase pts endurance and strength upon D/C  From OT standpoint, recommend D/C to home with family support when medically cleared  D/C pt from OT caseload at this time  The patient's raw score on the AM-PAC Daily Activity inpatient short form is 24, standardized score is 57 54, less than 39 4  Patients at this level are likely to benefit from discharge to home  Please refer to the recommendation of the Occupational Therapist for safe discharge planning  Goals   Patient Goals to go home   Plan   OT Frequency Eval only   Recommendation   OT Discharge Recommendation No rehabilitation needs  (no OT needs)   AM-PAC Daily Activity Inpatient   Lower Body Dressing 4   Bathing 4   Toileting 4   Upper Body Dressing 4   Grooming 4   Eating 4   Daily Activity Raw Score 24   Daily Activity Standardized Score (Calc for Raw Score >=11) 57 54   AM-PAC Applied Cognition Inpatient   Following a Speech/Presentation 4   Understanding Ordinary Conversation 4   Taking Medications 4   Remembering Where Things Are Placed or Put Away 4   Remembering List of 4-5 Errands 4   Taking Care of Complicated Tasks 4   Applied Cognition Raw Score 24   Applied Cognition Standardized Score 62 21        Pt with no OT needs, will d/c from OT services at this time       ELDA Chew/L

## 2021-09-09 ENCOUNTER — PREPPED CHART (OUTPATIENT)
Dept: URBAN - METROPOLITAN AREA CLINIC 6 | Facility: CLINIC | Age: 86
End: 2021-09-09

## 2021-09-09 ENCOUNTER — COMPLETE EYE EXAM (OUTPATIENT)
Dept: URBAN - METROPOLITAN AREA CLINIC 6 | Facility: CLINIC | Age: 86
End: 2021-09-09

## 2021-09-09 DIAGNOSIS — H25.11: ICD-10-CM

## 2021-09-09 DIAGNOSIS — H50.22: ICD-10-CM

## 2021-09-09 PROCEDURE — 92060 SENSORIMOTOR EXAMINATION: CPT

## 2021-09-09 PROCEDURE — 92014 COMPRE OPH EXAM EST PT 1/>: CPT

## 2021-09-09 ASSESSMENT — VISUAL ACUITY
OD_PH: 20/30
OS_CC: 20/25+2
OS_CC: J1
OD_CC: J3
OD_CC: 20/40

## 2021-09-09 ASSESSMENT — TONOMETRY
OS_IOP_MMHG: 14
OD_IOP_MMHG: 11

## 2021-09-15 ENCOUNTER — OFFICE VISIT (OUTPATIENT)
Dept: VASCULAR SURGERY | Facility: CLINIC | Age: 86
End: 2021-09-15
Payer: COMMERCIAL

## 2021-09-15 VITALS
SYSTOLIC BLOOD PRESSURE: 110 MMHG | HEIGHT: 69 IN | BODY MASS INDEX: 25.18 KG/M2 | DIASTOLIC BLOOD PRESSURE: 60 MMHG | WEIGHT: 170 LBS | HEART RATE: 78 BPM | TEMPERATURE: 98 F

## 2021-09-15 DIAGNOSIS — I65.29 CAROTID STENOSIS: Primary | ICD-10-CM

## 2021-09-15 DIAGNOSIS — H81.4 VERTIGO OF CENTRAL ORIGIN: ICD-10-CM

## 2021-09-15 DIAGNOSIS — I65.02 STENOSIS OF LEFT VERTEBRAL ARTERY: ICD-10-CM

## 2021-09-15 DIAGNOSIS — H53.9 VISUAL CHANGES: ICD-10-CM

## 2021-09-15 DIAGNOSIS — I65.23 BILATERAL CAROTID ARTERY STENOSIS: ICD-10-CM

## 2021-09-15 PROCEDURE — 99204 OFFICE O/P NEW MOD 45 MIN: CPT | Performed by: SURGERY

## 2021-09-15 NOTE — PATIENT INSTRUCTIONS
Bilateral carotid artery stenosis  Asymptomatic bilateral carotid artery stenosis, right 50-69% and left <50%  No previous TIA/CVA  Former smoker many years ago      Reviewed carotid duplex and CTA head/neck with patient with findings above      -We discussed the pathophysiology of carotid disease, available treatment options and indications for treatment   -No indication for treatment of asymptomatic carotid disease at current degree of stenosis  -Continue optimization of medical management  Currently on ASA, plavix and statin  No indication to continue plavix for asymptomatic carotid disease  Started for vertebral occlusion, would discontinue after 3 months   -Will obtain 6 month carotid duplex and follow-up in 6 months  Advised to return sooner or go to the ED if he develops any TIA/CVA symptoms         Vertebral artery stenosis  Recent finding of left vertebral artery occlusion with distal reconstitution on recent admission for dizziness  The right vertebral artery and basilar artery are normal  Intracerebral circulation is otherwise normal  Mild carotid disease  Degree of extracranial and intracranial disease is unlikely to explain symptoms of dizziness, imbalance and visual symptoms  Started on dual antiplatelet therapy and increased statin dose while in the hospital given baseline atherosclerotic disease  Has had increased bruising and worsening visual symptoms  Would stop plavix after 3 months       Vertigo of central origin  Vertigo receiving vestibular therapy and on meclizine  Follows with ENT  Continue follow-up  Doubt symptoms are related to isolated left vertebral artery occlusion with mild extracranial/extracranial vascular disease      Visual changes  Worsening visual changes, has been seen by eye doctor who recommended follow-up with a neurologist  Unclear etiology of progressive vision changes  Also with dry eyes on restasis eye drops  Neurology referral placed

## 2021-09-15 NOTE — ASSESSMENT & PLAN NOTE
Asymptomatic bilateral carotid artery stenosis, right 50-69% and left <50%  No previous TIA/CVA  Former smoker many years ago  Reviewed carotid duplex and CTA head/neck with patient with findings above     -We discussed the pathophysiology of carotid disease, available treatment options and indications for treatment   -No indication for treatment of asymptomatic carotid disease at current degree of stenosis  -Continue optimization of medical management  Currently on ASA, plavix and statin  No indication to continue plavix for asymptomatic carotid disease  Started for vertebral occlusion, would discontinue after 3 months   -Will obtain 6 month carotid duplex and follow-up in 6 months  Advised to return sooner or go to the ED if he develops any TIA/CVA symptoms

## 2021-09-15 NOTE — ASSESSMENT & PLAN NOTE
Vertigo receiving vestibular therapy and on meclizine  Follows with ENT  Continue follow-up  Doubt symptoms are related to isolated left vertebral artery occlusion with mild extracranial/extracranial vascular disease

## 2021-09-15 NOTE — LETTER
September 15, 2021     Will [address]  [address] [facility] [address]    Patient: Terry Crabtree   YOB: 1935   Date of Visit: 9/15/2021       Dear Dr Gatito Low:    Thank you for referring Meaghan Lawrence to me for evaluation  Below are the relevant portions of my assessment and plan of care  Diagnoses and all orders for this visit:    Bilateral carotid artery stenosis  Asymptomatic bilateral carotid artery stenosis, right 50-69% and left <50%  No previous TIA/CVA  Former smoker many years ago      Reviewed carotid duplex and CTA head/neck with patient with findings above      -We discussed the pathophysiology of carotid disease, available treatment options and indications for treatment   -No indication for treatment of asymptomatic carotid disease at current degree of stenosis  -Continue optimization of medical management  Currently on ASA, plavix and statin  No indication to continue plavix for asymptomatic carotid disease  Started for vertebral occlusion, would discontinue after 3 months   -Will obtain 6 month carotid duplex and follow-up in 6 months  Advised to return sooner or go to the ED if he develops any TIA/CVA symptoms         Vertebral artery stenosis  Recent finding of left vertebral artery occlusion with distal reconstitution on recent admission for dizziness  The right vertebral artery and basilar artery are normal  Intracerebral circulation is otherwise normal  Mild carotid disease  Degree of extracranial and intracranial disease is unlikely to explain symptoms of dizziness, imbalance and visual symptoms  Started on dual antiplatelet therapy and increased statin dose while in the hospital given baseline atherosclerotic disease  Has had increased bruising and worsening visual symptoms  Would stop plavix after 3 months       Vertigo of central origin  Vertigo receiving vestibular therapy and on meclizine  Follows with ENT  Continue follow-up   Doubt symptoms are related to isolated left vertebral artery occlusion with mild extracranial/extracranial vascular disease      Visual changes  Worsening visual changes, has been seen by eye doctor who recommended follow-up with a neurologist  Unclear etiology of progressive vision changes  Also with dry eyes on restasis eye drops  Neurology referral placed  If you have questions, please do not hesitate to call me  I look forward to following Joellenmalmirna Donnie along with you           Sincerely,        John To MD        CC: No Recipients

## 2021-09-15 NOTE — ASSESSMENT & PLAN NOTE
Worsening visual changes, has been seen by eye doctor who recommended follow-up with a neurologist  Unclear etiology of progressive vision changes  Also with dry eyes on restasis eye drops  Neurology referral placed

## 2021-09-15 NOTE — ASSESSMENT & PLAN NOTE
Recent finding of left vertebral artery occlusion with distal reconstitution on recent admission for dizziness  The right vertebral artery and basilar artery are normal  Intracerebral circulation is otherwise normal  Mild carotid disease  Degree of extracranial and intracranial disease is unlikely to explain symptoms of dizziness, imbalance and visual symptoms  Started on dual antiplatelet therapy and increased statin dose while in the hospital given baseline atherosclerotic disease  Has had increased bruising and worsening visual symptoms  Would stop plavix after 3 months
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
## 2021-09-24 ENCOUNTER — TELEPHONE (OUTPATIENT)
Dept: NEUROLOGY | Facility: CLINIC | Age: 86
End: 2021-09-24
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
## 2021-09-30 ENCOUNTER — TELEPHONE (OUTPATIENT)
Dept: NEUROLOGY | Facility: CLINIC | Age: 86
End: 2021-09-30

## 2021-09-30 ENCOUNTER — CONSULT (OUTPATIENT)
Dept: NEUROLOGY | Facility: CLINIC | Age: 86
End: 2021-09-30
Payer: COMMERCIAL

## 2021-09-30 VITALS
TEMPERATURE: 97.2 F | DIASTOLIC BLOOD PRESSURE: 71 MMHG | HEART RATE: 100 BPM | WEIGHT: 171.4 LBS | HEIGHT: 69 IN | BODY MASS INDEX: 25.39 KG/M2 | SYSTOLIC BLOOD PRESSURE: 129 MMHG

## 2021-09-30 DIAGNOSIS — M54.41 CHRONIC MIDLINE LOW BACK PAIN WITH BILATERAL SCIATICA: ICD-10-CM

## 2021-09-30 DIAGNOSIS — I65.23 BILATERAL CAROTID ARTERY STENOSIS: ICD-10-CM

## 2021-09-30 DIAGNOSIS — M54.42 CHRONIC MIDLINE LOW BACK PAIN WITH BILATERAL SCIATICA: ICD-10-CM

## 2021-09-30 DIAGNOSIS — I65.02 VERTEBRAL ARTERY STENOSIS, LEFT: ICD-10-CM

## 2021-09-30 DIAGNOSIS — H55.09 DOWNBEAT NYSTAGMUS: Primary | ICD-10-CM

## 2021-09-30 DIAGNOSIS — R79.89 RAISED TSH LEVEL: ICD-10-CM

## 2021-09-30 DIAGNOSIS — G89.29 CHRONIC MIDLINE LOW BACK PAIN WITH BILATERAL SCIATICA: ICD-10-CM

## 2021-09-30 DIAGNOSIS — R42 DIZZINESS: ICD-10-CM

## 2021-09-30 PROCEDURE — 99204 OFFICE O/P NEW MOD 45 MIN: CPT | Performed by: PSYCHIATRY & NEUROLOGY

## 2021-09-30 RX ORDER — MECLIZINE HYDROCHLORIDE 25 MG/1
TABLET ORAL 3 TIMES DAILY
COMMUNITY
End: 2022-07-28 | Stop reason: SDUPTHER

## 2021-09-30 NOTE — PROGRESS NOTES
[facility] MULTIPLE SCLEROSIS CENTER  PATIENT:  Jaxson Ruiz  MRN:  [id number]  :  1935  DATE OF SERVICE:  2021    Assessment/Plan:       Problem List Items Addressed This Visit        Cardiovascular and Mediastinum    Vertebral artery stenosis, left    Bilateral carotid artery stenosis       Nervous and Auditory    Chronic midline low back pain with bilateral sciatica    Relevant Orders    XR spine lumbar minimum 4 views non injury       Other    Dizziness    Raised TSH level    Downbeat nystagmus - Primary          Mr Enriqueta Gonzalez  Has presented to [address] [place] [address] sclerosis [place] for evaluation disbalance and nystagmus  Patient had initial ENT evaluation with moderate right SNHL with downbeat nystagmus on [employer] OU a, same concern was brought by Ophthalmology Dr Fidelina Leslie during his recent visit  We discussed  Or and personally reviewed patient brain MRI which consistent with no acute or chronic ischemic or hemorrhagic changes, no significant small-vessel disease, no signs of hydrocephalus  Patient has no signs of impending as damage dementia, with no neurological findings of Parkinson disease  We discussed  Dizziness might be multifactorial the including vertebrobasilar insufficiency in the light of severe vertebral artery stenosis in addition to multi sensory dysfunction of elderly, wich includes  hearing/vestibular/vision dysfunction;  Patient has challenging reading status post cataract removal    CT angiogram results were discussed as well, severe vertebral artery occlusion at its origin, age indeterminate  Patient is to continue clopidogrel and Lipitor;  Dual antiplatelet therapy is most commonly recommended to the patients with advanced intracranial atherosclerotic changes; VBI might in part cause downbeat nystagmus, no intracranilla pathology noted though      Neurological exam was consistent with full strength upper lower extremity, no signs of cogwheel rigidity, no signs of peripheral polyneuropathy with preserved reflexes upper lower extremities  Patient has significant balance issues and nystagmus on horizontal gaze to left and right side  Right superior semicircular canal dehiscence should be further evaluated by primary ENT team     patient is to continue physical therapy,  Vision therapy might be considered,  Has no signs of convergence insufficiency on exam    patient was offered lumbar x-ray in the light of intermittent pain in the lower back with pain radiating along the posterior part of his legs with concern for sciatica  Patient ambulates with a cane  Follow-up with [facility] within 6 months        Subjective:  visual disturbance, dizziness with known Right superior semicircular canal dehiscence, weakness in right leg    HPI  Mr Christ Jeans is a 79 yo male who was referred to  [address] [place] [address] for evaluation of dizziness and nystagmus  Patient presented with his wife  Patient brought 3 separate complaints during this visit:  visual disturbance, dizziness, weakness in right leg      Recent hospitalization for dizziness was consistent with VERTEBRAL ARTERY STENOSIS, RIGHT ICA stenosis, elevated TSH; dizziness-resolved as of 9/3/2021:   Presents with dizziness for which he has been evaluated by ENT in the past   Recent MRI  3/14/21 with IAC w/wo contrast unremarkable   · 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  · Follows with outpatient ENT   · Continue with PRN meclizine, can use up to 25 mg 3 times a day and advised to discuss with primary care doctor if no improvement  · Outpatient PT    CTA head and neck: No acute intracranial abnormality      No focal stenosis or saccular aneurysm within the [place]      Moderate atherosclerotic calcific plaquing of the right carotid bulb causing approximately 60% stenosis by NASCET criteria      Severe stenosis at the origin of the left vertebral artery with a long segment of no detectable flow involving the V1 and proximal to mid V2 segments  These findings are age indeterminant  No prior studies are available for comparison  Audiology evaluation: [place] Caloric Irrigation: 17% right unilateral weakness within normal limits    Findings:  Non-localizing central      Positionals:                           Sitting: Normal                          Supine: Normal                          Head Right: 4 degrees Downbeating nystagmus                          Head Left: 2 degrees Leftbeating nystagmus, 4 degrees Downbeating nystagmus                          Body Right: 3 degrees Rightbeating nystagmus, 7 degrees Downbeating nystagmus                          Body Left: 5 degrees Leftbeating nystagmus, 3 degrees Downbeating nystagmus                                     MRI brain 9/2021: There is no discrete mass, mass effect or midline shift  There is no intracranial hemorrhage  There is no evidence of acute infarction and diffusion imaging is unremarkable  There are no white matter changes in the cerebral   hemispheres           The following portions of the patient's history were reviewed and updated as appropriate:   He  has a past medical history of BPH with obstruction/lower urinary tract symptoms, Cataract, Dizziness, Ear problems, Elevated PSA, Hypercholesterolemia, Hypertension, and Poor urinary stream   He   Patient Active Problem List    Diagnosis Date Noted    Downbeat nystagmus 09/30/2021    Chronic midline low back pain with bilateral sciatica 09/30/2021    Visual changes 09/15/2021    Bilateral carotid artery stenosis 09/03/2021    Vertebral artery stenosis, left 09/02/2021    Dizziness 09/02/2021    Raised TSH level 09/02/2021    Vertigo of central origin 07/13/2021    Bilateral sensorineural hearing loss 07/13/2021    Constipation 07/09/2018    Generalized weakness 07/07/2018    Thrombocytopenia ([address] ) 07/07/2018    Hyponatremia 07/07/2018    Atypical chest pain 06/05/2018    Lipoma of axilla 06/05/2018    Mixed hyperlipidemia 06/05/2018    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    Elevated PSA 06/05/2018    Other microscopic hematuria 06/05/2018    Essential hypertension 07/26/2017     He  has a past surgical history that includes Prostate biopsy (2005); Cataract extraction; Knee surgery (2005); and Rotator cuff repair (06/15/2015)  His family history includes Cancer in his father; Diabetes in his father and mother; Heart disease in his mother  He  reports that he has quit smoking  He has never used smokeless tobacco  He reports current alcohol use of about 14 0 standard drinks of alcohol per week  He reports that he does not use drugs  Current Outpatient Medications   Medication Sig Dispense Refill    aspirin 81 MG tablet Daily      atorvastatin (LIPITOR) 40 mg tablet Take 1 tablet (40 mg total) by mouth daily at bedtime 30 tablet 0    Cholecalciferol (VITAMIN D3) 2000 units capsule Daily      clopidogrel (PLAVIX) 75 mg tablet Take 1 tablet (75 mg total) by mouth daily 30 tablet 0    lisinopril (ZESTRIL) 20 mg tablet       meclizine (ANTIVERT) 25 mg tablet 3 (three) times a day      RESTASIS 0 05 % ophthalmic emulsion INSTILL 1 DROP INTO BOTH EYES TWICE A DAY      meclizine (ANTIVERT) 12 5 MG tablet Take 12 5 mg by mouth 3 (three) times a day as needed  (Patient not taking: Reported on 9/30/2021)      ondansetron (ZOFRAN-ODT) 4 mg disintegrating tablet Take 1 tablet (4 mg total) by mouth every 6 (six) hours as needed for nausea or vomiting (Patient not taking: Reported on 6/22/2021) 20 tablet 0     No current facility-administered medications for this visit       Current Outpatient Medications on File Prior to Visit   Medication Sig    aspirin 81 MG tablet Daily    atorvastatin (LIPITOR) 40 mg tablet Take 1 tablet (40 mg total) by mouth daily at bedtime    Cholecalciferol (VITAMIN D3) 2000 units capsule Daily  clopidogrel (PLAVIX) 75 mg tablet Take 1 tablet (75 mg total) by mouth daily    lisinopril (ZESTRIL) 20 mg tablet     meclizine (ANTIVERT) 25 mg tablet 3 (three) times a day    RESTASIS 0 05 % ophthalmic emulsion INSTILL 1 DROP INTO BOTH EYES TWICE A DAY    meclizine (ANTIVERT) 12 5 MG tablet Take 12 5 mg by mouth 3 (three) times a day as needed  (Patient not taking: Reported on 9/30/2021)    ondansetron (ZOFRAN-ODT) 4 mg disintegrating tablet Take 1 tablet (4 mg total) by mouth every 6 (six) hours as needed for nausea or vomiting (Patient not taking: Reported on 6/22/2021)     No current facility-administered medications on file prior to visit  He has No Known Allergies            Objective:    Blood pressure 129/71, pulse 100, temperature (!) 97 2 °F (36 2 °C), temperature source Skin, height 5' 9" (1 753 m), weight 77 7 kg (171 lb 6 4 oz)  Physical Exam    Neurological Exam    CONSTITUTIONAL: NAD, pleasant  NECK: supple, no lymphadenopathy, no thyromegaly, no JVD  CARDIOVASCULAR: RRR, normal S1S2, no murmurs, no rubs  RESP: clear to auscultation bilaterally, no wheezes/rhonchi/rales  ABDOMEN: soft, non tender, non distended  SKIN: no rash or skin lesions  EXTREMITIES: no edema, pulses 2+bilaterally  PSYCH: appropriate mood and affect  NEUROLOGIC COMPREHENSIVE EXAM: Patient is oriented to person, place and time, NAD; appropriate affect  CN II, III, IV, V, VI, VII,VIII,IX,X,XI-XII intact with EOMI, PERRLA, OKN abnormal; downbeat nystagmus on horizontal gaze to the right an left, VF grossly intact, fundi poorly visualized secondary to pupillary constriction; symmetric face noted  Motor: 5/5 UE/LE bilateral symmetric; Sensory: intact to light touch and pinprick bilaterally; normal vibration sensation feet bilaterally; Coordination abnormal limits on FTN and BECKA testing; DTR: 2/4 through, no Babinski, no clonus  Tandem gait is abnormal  Romberg: positive      ROS:  12 points of review of system was reviewed with the patient and was unremarkable with exception: see HPI  Review of Systems   Constitutional: Negative  Negative for appetite change and fever  HENT: Negative  Negative for hearing loss, tinnitus, trouble swallowing and voice change  Eyes: Positive for visual disturbance  Negative for photophobia and pain  Respiratory: Negative  Negative for shortness of breath  Cardiovascular: Negative  Negative for palpitations  Gastrointestinal: Negative  Negative for nausea and vomiting  Endocrine: Negative  Negative for cold intolerance  Genitourinary: Negative  Negative for dysuria, frequency and urgency  Musculoskeletal: Negative  Negative for myalgias and neck pain  Skin: Negative  Negative for rash  Neurological: Positive for dizziness, weakness (right leg) and light-headedness  Negative for tremors, seizures, syncope, facial asymmetry, speech difficulty, numbness and headaches  Hematological: Negative  Does not bruise/bleed easily  Psychiatric/Behavioral: Negative  Negative for confusion, hallucinations and sleep disturbance

## 2021-09-30 NOTE — TELEPHONE ENCOUNTER
ADD ON - patient is scheduled with Dr Alberto England on 9/30 @ 12:30p in [facility] has been verified

## 2021-10-07 ENCOUNTER — APPOINTMENT (OUTPATIENT)
Dept: RADIOLOGY | Age: 86
End: 2021-10-07
Payer: COMMERCIAL

## 2021-10-07 DIAGNOSIS — M54.41 CHRONIC MIDLINE LOW BACK PAIN WITH BILATERAL SCIATICA: ICD-10-CM

## 2021-10-07 DIAGNOSIS — M54.42 CHRONIC MIDLINE LOW BACK PAIN WITH BILATERAL SCIATICA: ICD-10-CM

## 2021-10-07 DIAGNOSIS — G89.29 CHRONIC MIDLINE LOW BACK PAIN WITH BILATERAL SCIATICA: ICD-10-CM

## 2021-10-07 PROCEDURE — 72110 X-RAY EXAM L-2 SPINE 4/>VWS: CPT

## 2021-11-01 ENCOUNTER — APPOINTMENT (OUTPATIENT)
Dept: LAB | Facility: CLINIC | Age: 86
End: 2021-11-01
Payer: COMMERCIAL

## 2021-11-01 DIAGNOSIS — N13.8 BPH WITH OBSTRUCTION/LOWER URINARY TRACT SYMPTOMS: ICD-10-CM

## 2021-11-01 DIAGNOSIS — N40.1 BPH WITH OBSTRUCTION/LOWER URINARY TRACT SYMPTOMS: ICD-10-CM

## 2021-11-01 DIAGNOSIS — R97.20 ELEVATED PSA: ICD-10-CM

## 2021-11-01 LAB
BACTERIA UR QL AUTO: NORMAL /HPF
BILIRUB UR QL STRIP: NEGATIVE
CLARITY UR: CLEAR
COLOR UR: YELLOW
GLUCOSE UR STRIP-MCNC: NEGATIVE MG/DL
HGB UR QL STRIP.AUTO: NEGATIVE
HYALINE CASTS #/AREA URNS LPF: NORMAL /LPF
KETONES UR STRIP-MCNC: NEGATIVE MG/DL
LEUKOCYTE ESTERASE UR QL STRIP: NEGATIVE
NITRITE UR QL STRIP: NEGATIVE
NON-SQ EPI CELLS URNS QL MICRO: NORMAL /HPF
PH UR STRIP.AUTO: 6 [PH]
PROT UR STRIP-MCNC: NEGATIVE MG/DL
RBC #/AREA URNS AUTO: NORMAL /HPF
SP GR UR STRIP.AUTO: 1.01 (ref 1–1.03)
UROBILINOGEN UR QL STRIP.AUTO: 0.2 E.U./DL
WBC #/AREA URNS AUTO: NORMAL /HPF

## 2021-11-01 PROCEDURE — 84153 ASSAY OF PSA TOTAL: CPT

## 2021-11-01 PROCEDURE — 36415 COLL VENOUS BLD VENIPUNCTURE: CPT

## 2021-11-01 PROCEDURE — 81001 URINALYSIS AUTO W/SCOPE: CPT

## 2021-11-01 PROCEDURE — 84154 ASSAY OF PSA FREE: CPT

## 2021-11-03 LAB
PSA FREE MFR SERPL: 30 %
PSA FREE SERPL-MCNC: 2.07 NG/ML
PSA SERPL-MCNC: 6.9 NG/ML (ref 0–4)

## 2021-11-19 ENCOUNTER — OFFICE VISIT (OUTPATIENT)
Dept: UROLOGY | Facility: MEDICAL CENTER | Age: 86
End: 2021-11-19
Payer: COMMERCIAL

## 2021-11-19 VITALS
SYSTOLIC BLOOD PRESSURE: 120 MMHG | HEART RATE: 95 BPM | DIASTOLIC BLOOD PRESSURE: 80 MMHG | BODY MASS INDEX: 26.81 KG/M2 | HEIGHT: 69 IN | WEIGHT: 181 LBS

## 2021-11-19 DIAGNOSIS — N13.8 BPH WITH OBSTRUCTION/LOWER URINARY TRACT SYMPTOMS: ICD-10-CM

## 2021-11-19 DIAGNOSIS — R97.20 ELEVATED PSA: Primary | ICD-10-CM

## 2021-11-19 DIAGNOSIS — N40.1 BPH WITH OBSTRUCTION/LOWER URINARY TRACT SYMPTOMS: ICD-10-CM

## 2021-11-19 LAB
SL AMB  POCT GLUCOSE, UA: ABNORMAL
SL AMB LEUKOCYTE ESTERASE,UA: ABNORMAL
SL AMB POCT BILIRUBIN,UA: ABNORMAL
SL AMB POCT BLOOD,UA: ABNORMAL
SL AMB POCT CLARITY,UA: CLEAR
SL AMB POCT COLOR,UA: YELLOW
SL AMB POCT KETONES,UA: ABNORMAL
SL AMB POCT NITRITE,UA: ABNORMAL
SL AMB POCT PH,UA: 5.5
SL AMB POCT SPECIFIC GRAVITY,UA: 1.01
SL AMB POCT URINE PROTEIN: ABNORMAL
SL AMB POCT UROBILINOGEN: 0.2

## 2021-11-19 PROCEDURE — 99213 OFFICE O/P EST LOW 20 MIN: CPT | Performed by: NURSE PRACTITIONER

## 2021-11-19 PROCEDURE — 81003 URINALYSIS AUTO W/O SCOPE: CPT | Performed by: NURSE PRACTITIONER

## 2021-11-19 RX ORDER — METHYLPREDNISOLONE 4 MG/1
TABLET ORAL
COMMUNITY
End: 2022-07-28

## 2021-11-19 RX ORDER — CYCLOSPORINE 0.5 MG/ML
EMULSION OPHTHALMIC
COMMUNITY

## 2022-01-27 ENCOUNTER — TELEPHONE (OUTPATIENT)
Dept: NEUROLOGY | Facility: CLINIC | Age: 87
End: 2022-01-27

## 2022-01-27 NOTE — TELEPHONE ENCOUNTER
Pt had cx his March 6M f/u TP appt via [employer] [facility]      LMOM asking if he'd like to r/s     Gave T4 ext

## 2022-05-24 ENCOUNTER — HOSPITAL ENCOUNTER (EMERGENCY)
Facility: HOSPITAL | Age: 87
Discharge: HOME | End: 2022-05-24
Attending: EMERGENCY MEDICINE
Payer: COMMERCIAL

## 2022-05-24 ENCOUNTER — APPOINTMENT (EMERGENCY)
Dept: CT IMAGING | Facility: HOSPITAL | Age: 87
End: 2022-05-24
Payer: COMMERCIAL

## 2022-05-24 VITALS
HEART RATE: 82 BPM | RESPIRATION RATE: 18 BRPM | DIASTOLIC BLOOD PRESSURE: 70 MMHG | OXYGEN SATURATION: 98 % | TEMPERATURE: 97.9 F | SYSTOLIC BLOOD PRESSURE: 145 MMHG

## 2022-05-24 DIAGNOSIS — S50.12XA TRAUMATIC HEMATOMA OF FOREARM, LEFT, INITIAL ENCOUNTER: ICD-10-CM

## 2022-05-24 DIAGNOSIS — S80.212A ABRASION OF LEFT KNEE, INITIAL ENCOUNTER: ICD-10-CM

## 2022-05-24 DIAGNOSIS — S09.90XA CLOSED HEAD INJURY, INITIAL ENCOUNTER: Primary | ICD-10-CM

## 2022-05-24 PROCEDURE — 90471 IMMUNIZATION ADMIN: CPT

## 2022-05-24 PROCEDURE — 70450 CT HEAD/BRAIN W/O DYE: CPT

## 2022-05-24 PROCEDURE — 99284 EMERGENCY DEPT VISIT MOD MDM: CPT

## 2022-05-24 PROCEDURE — G1004 CDSM NDSC: HCPCS

## 2022-05-24 PROCEDURE — 90715 TDAP VACCINE 7 YRS/> IM: CPT | Performed by: EMERGENCY MEDICINE

## 2022-05-24 PROCEDURE — 99284 EMERGENCY DEPT VISIT MOD MDM: CPT | Performed by: EMERGENCY MEDICINE

## 2022-05-24 RX ADMIN — TETANUS TOXOID, REDUCED DIPHTHERIA TOXOID AND ACELLULAR PERTUSSIS VACCINE, ADSORBED 0.5 ML: 5; 2.5; 8; 8; 2.5 SUSPENSION INTRAMUSCULAR at 21:53

## 2022-05-25 NOTE — ED PROVIDER NOTES
Emergency Department Trauma Note  Melodie Muir 80 y o  male MRN: [id number]  Unit/Bed#: [facility] Encounter: [id number]      Trauma Alert: Trauma Acuity: C  Model of Arrival: Mode of Arrival: Direct from scene via    Trauma Team: Current Providers  Attending Provider: Claire Lacey DO  Registered Nurse: Meera Babcock RN  Resident: Jessie Osorio MD  Consultants:     None      History of Present Illness     Chief Complaint:   Chief Complaint   Patient presents with    Fall     Pt reports falling stepping up onto pavement  Pt reports falling on to his left side  Unknown head strike  Pt takes a baby aspirin daily  GCS 15     HPI:  Melodie Muir is a 80 y o  male who presents with mechanical fall after stepping over a broken curve while at near in a gymnasium     Mechanism:           Winifred Dodd comes emergency department after experiencing a mechanical fall outside of a workout facility  He states that he takes a baby aspirin each day which given his head strike during his fall qualified him for trauma level C activation in the emergency department  He denies any dizziness, lightheadedness, palpations, loss of sensation, loss of consciousness, changes in vision, changes in hearing prior to his fall  He does describe that he has longstanding equilibrium issues or balance issues, but states that he did not experience any worsening of the symptoms prior to his fall  He does describe that the sidewalk in which he stumbled over was more cracked and broken other side walks in the area  He states that he grazed the side of his head that knocked his glasses from his face after falling  He also noted that he had increased bruising over his left forearm and a superficial skin abrasion on his left knee  After evaluation by staff at the area in which he fell, he had ointment, bandages placed on his knee prior to transfer to the emergency department    No active sites of extravasation or bleeding appreciated in his scalp or over his entire body during examination and during his initial evaluation by both his spouse and the observers at his initial fall site  History provided by:  Patient   used: No    Fall  Mechanism of injury: fall    Injury location:  Head/neck and shoulder/arm  Head/neck injury location:  Head  Shoulder/arm injury location:  L forearm  Incident location:  Outdoors  Time since incident:  1 hour  Arrived directly from scene: yes    Fall:     Fall occurred:  Standing    Impact surface:  [employer] of impact:  Head and hands    Entrapped after fall: no    Protective equipment: none    Suspicion of alcohol use: no    Suspicion of drug use: no    Tetanus status:  Out of date  Associated symptoms: no abdominal pain, no back pain, no blindness, no chest pain, no difficulty breathing, no headaches, no hearing loss, no loss of consciousness, no nausea, no neck pain, no seizures and no vomiting      Review of Systems   Constitutional: Negative for chills and fever  HENT: Negative for ear pain, hearing loss and sore throat  Eyes: Negative for blindness, pain and visual disturbance  Respiratory: Negative for cough and shortness of breath  Cardiovascular: Negative for chest pain and palpitations  Gastrointestinal: Negative for abdominal pain, nausea and vomiting  Genitourinary: Negative for dysuria and hematuria  Musculoskeletal: Negative for arthralgias, back pain and neck pain  Skin: Negative for color change and rash  Neurological: Negative for seizures, loss of consciousness, syncope and headaches  All other systems reviewed and are negative        Historical Information     Immunizations:   Immunization History   Administered Date(s) Administered    COVID-19 PFIZER VACCINE 0 3 ML IM 01/20/2021, 02/10/2021, 10/22/2021    INFLUENZA 04/02/2003, 10/07/2013, 10/31/2014, 10/31/2014, 10/14/2015, 11/01/2016, 10/20/2017    Influenza Split High Dose Preservative Free IM 10/14/2015, 11/01/2016, 10/20/2017, 10/17/2018, 10/04/2019    Influenza, high dose seasonal 0 7 mL 10/21/2020, 10/12/2021    Influenza, seasonal, injectable, preservative free 10/07/2013    Pneumococcal Conjugate 13-Valent 01/23/2016    Pneumococcal Polysaccharide PPV23 01/25/2013    Tdap 01/25/2013, 05/24/2022    Zoster 07/15/2008       Past Medical History:   Diagnosis Date    BPH with obstruction/lower urinary tract symptoms     Cataract     Dizziness     Ear problems     Elevated PSA     Hypercholesterolemia     Hypertension     Poor urinary stream        Family History   Problem Relation Age of Onset    Diabetes Mother     Heart disease Mother     Cancer Father     Diabetes Father      Past Surgical History:   Procedure Laterality Date    CATARACT EXTRACTION      KNEE SURGERY  2005    PROSTATE BIOPSY  2005    ROTATOR CUFF REPAIR  06/15/2015     Social History     Tobacco Use    Smoking status: Former Smoker    Smokeless tobacco: Never Used   Vaping Use    Vaping Use: Never used   Substance Use Topics    Alcohol use: Yes     Alcohol/week: 14 0 standard drinks     Types: 14 Cans of beer per week     Comment: 1-2 beers a day    Drug use: No     E-Cigarette/Vaping    E-Cigarette Use Never User      E-Cigarette/Vaping Substances    Nicotine No     THC No     CBD No     Flavoring No     Other No     Unknown No        Family History:   Family History   Problem Relation Age of Onset    Diabetes Mother     Heart disease Mother     Cancer Father     Diabetes Father        Meds/Allergies   Prior to Admission Medications   Prescriptions Last Dose Informant Patient Reported? Taking?    Cholecalciferol (VITAMIN D3) 2000 units capsule  Self Yes No   Sig: Daily   RESTASIS 0 05 % ophthalmic emulsion  Self Yes No   Sig: INSTILL 1 DROP INTO BOTH EYES TWICE A DAY   aspirin 81 MG tablet  Self Yes No   Sig: Daily   atorvastatin (LIPITOR) 40 mg tablet  Self No No   Sig: Take 1 tablet (40 mg total) by mouth daily at bedtime   clopidogrel (PLAVIX) 75 mg tablet  Self No No   Sig: Take 1 tablet (75 mg total) by mouth daily   cycloSPORINE (Restasis) 0 05 % ophthalmic emulsion   Yes No   Sig: Restasis 0 05 % eye drops in a dropperette   PUT 1 DROP INTO BOTH EYES TWICE A DAY   lisinopril (ZESTRIL) 20 mg tablet  Self Yes No   meclizine (ANTIVERT) 12 5 MG tablet  Self Yes No   Sig: Take 12 5 mg by mouth 3 (three) times a day as needed     meclizine (ANTIVERT) 25 mg tablet   Yes No   Sig: 3 (three) times a day   methylprednisolone (MEDROL) 4 mg tablet   Yes No   Sig: methylprednisolone 4 mg tablets in a dose pack   ondansetron (ZOFRAN-ODT) 4 mg disintegrating tablet  Self No No   Sig: Take 1 tablet (4 mg total) by mouth every 6 (six) hours as needed for nausea or vomiting      Facility-Administered Medications: None       No Known Allergies    PHYSICAL EXAM    PE limited by:  Nothing    Objective   Vitals:   First set: Temperature: 98 1 °F (36 7 °C) (05/24/22 2114)  Pulse: 94 (05/24/22 2114)  Respirations: 18 (05/24/22 2114)  Blood Pressure: (!) 177/86 (05/24/22 2114)  SpO2: 97 % (05/24/22 2114)    Primary Survey:   (A) Airway:  Intact, phonating speaking appropriately  (B) Breathing:  Bilateral chest rise, breath sounds appreciated in all lung fields bilaterally  (C) Circulation: Pulses:   normal   (D) Disabliity:  GCS Total:  15  (E) Expose:  Completed    Secondary Survey: (Click on Physical Exam tab above)  Physical Exam  Vitals and nursing note reviewed  Constitutional:       Appearance: Normal appearance  He is well-developed  HENT:      Head: Normocephalic and atraumatic  Comments: No active signs of extravasation, lacerations, or bruising appreciated on the skull  Right Ear: External ear normal       Left Ear: External ear normal       Nose: Nose normal  No rhinorrhea  Mouth/Throat:      Mouth: Mucous membranes are dry     Eyes:      Extraocular Movements: Extraocular movements intact  Conjunctiva/sclera: Conjunctivae normal    Cardiovascular:      Rate and Rhythm: Normal rate and regular rhythm  Pulses: Normal pulses  Pulmonary:      Effort: Pulmonary effort is normal  No respiratory distress  Breath sounds: Normal breath sounds  Abdominal:      General: Abdomen is flat  Palpations: Abdomen is soft  Tenderness: There is no abdominal tenderness  There is no right CVA tenderness, left CVA tenderness, guarding or rebound  Musculoskeletal:         General: Signs of injury present  No swelling or tenderness  Normal range of motion  Cervical back: Neck supple  Skin:     General: Skin is warm and dry  Capillary Refill: Capillary refill takes less than 2 seconds  Findings: Bruising present  Comments: Hematoma of left forearm  Neurological:      General: No focal deficit present  Mental Status: He is alert and oriented to person, place, and time  Cranial Nerves: No cranial nerve deficit  Psychiatric:         Mood and Affect: Mood normal          Cervical spine cleared by clinical criteria? Yes     Invasive Devices  Report    None                 Lab Results:   Results Reviewed     None                 Imaging Studies:   Direct to CT: Yes  CT head without contrast   ED Interpretation by Pavan Ponce MD (05/24 2201)   FINDINGS:     PARENCHYMA:  No intracranial mass, mass effect or midline shift  No CT signs of acute infarction  No acute parenchymal hemorrhage  Mild intracranial atherosclerotic disease  Stable generalized cerebral cortical volume loss, slightly more pronounced in   the posterior fossa      VENTRICLES AND EXTRA-AXIAL SPACES: Ventricles are normal in size  Stable probable of the cerebellar greater than cerebral sulci      VISUALIZED ORBITS AND PARANASAL SINUSES:  Evidence of prior left lens surgery    Rightward nasal septal deviation      CALVARIUM AND EXTRACRANIAL SOFT TISSUES: Normal      IMPRESSION:     No acute intracranial abnormality      Stable cerebellar greater than cerebral volume loss                        Workstation performed: [id number]      Final Result by Cesia Bynum MD (05/24 2146)      No acute intracranial abnormality  Stable cerebellar greater than cerebral volume loss  Workstation performed: [id number]               Procedures  Procedures         ED Course           MDM  Number of Diagnoses or Management Options  Abrasion of left knee, initial encounter: new and requires workup  Closed head injury, initial encounter: new and requires workup  Traumatic hematoma of forearm, left, initial encounter: new and requires workup  Diagnosis management comments: Vira Mendoza comes to the emergency department after a mechanical fall outside that resulted in head strike and bruising to his left knee and left forearm  Based off his utilization of aspirin daily, patient was qualified as a trauma level see upon arrival to the emergency department  Basal a physical examination and evaluation at the bedside, patient was cleared of C-spine precautions by physical examination  Given his head strike with utilization of aspirin, CT head without contrast was ordered  CT scan demonstrated no acute intracranial pathology  Patient was provided with a ice pack with regards to the hematoma  Both patient and spouse stated that the hematoma on left forearm had significantly improved during the transportation from the site of the incident/fall to the emergency department  Patient was deemed stable for discharge home given evaluation of vitals, unremarkable imaging studies, and unremarkable physical examination  Patient expressed understanding with strict strict return precautions that were discussed at the bedside    Patient was counseled on following up with her PCP as soon as possible for continued evaluation with which both the spouse and the patient expressed understanding  Disposition:  Discharged home with symptomatic management for injuries, strict return precautions discussed at bedside, PCP follow-up  Amount and/or Complexity of Data Reviewed  Tests in the radiology section of CPT®: ordered and reviewed  Obtain history from someone other than the patient: yes  Review and summarize past medical records: yes  Discuss the patient with other providers: yes  Independent visualization of images, tracings, or specimens: yes    Risk of Complications, Morbidity, and/or Mortality  Presenting problems: moderate  Diagnostic procedures: moderate  Management options: moderate    Patient Progress  Patient progress: stable          Disposition  [facility] Transfer: No  Final diagnoses:   Closed head injury, initial encounter   Traumatic hematoma of forearm, left, initial encounter   Abrasion of left knee, initial encounter     Time reflects when diagnosis was documented in both MDM as applicable and the Disposition within this note     Time User Action Codes Description Comment    5/24/2022 10:03 PM Cornel Marin Add [S09 90XA] Closed head injury, initial encounter     5/24/2022 10:03 PM Cornel Marin Add [S50 12XA] Traumatic hematoma of forearm, left, initial encounter     5/24/2022 10:03 PM Cherri Torres Add [S80 212A] Abrasion of left knee, initial encounter       ED Disposition     ED Disposition   Discharge    Condition   Stable    Date/Time   Tue May 24, 2022 10:03 PM    Comment   Aarti Samson discharge to home/self care                 Follow-up Information     Follow up With Specialties Details Why Contact Info    Cinthya Carnes MD Internal Medicine In 1 week As needed 2024 [address]  [phone]          Discharge Medication List as of 5/24/2022 10:04 PM      CONTINUE these medications which have NOT CHANGED    Details   aspirin 81 MG tablet Daily, Historical Med      atorvastatin (LIPITOR) 40 mg tablet Take 1 tablet (40 mg total) by mouth daily at bedtime, Starting Fri 9/3/2021, Normal      Cholecalciferol (VITAMIN D3) 2000 units capsule Daily, Historical Med      clopidogrel (PLAVIX) 75 mg tablet Take 1 tablet (75 mg total) by mouth daily, Starting Fri 9/3/2021, Normal      !! cycloSPORINE (Restasis) 0 05 % ophthalmic emulsion Restasis 0 05 % eye drops in a dropperette   PUT 1 DROP INTO BOTH EYES TWICE A DAY, Historical Med      lisinopril (ZESTRIL) 20 mg tablet Starting Fri 2/12/2021, Historical Med      !! meclizine (ANTIVERT) 12 5 MG tablet Take 12 5 mg by mouth 3 (three) times a day as needed  , Starting Mon 1/11/2021, Historical Med      !! meclizine (ANTIVERT) 25 mg tablet 3 (three) times a day, Historical Med      methylprednisolone (MEDROL) 4 mg tablet methylprednisolone 4 mg tablets in a dose pack, Historical Med      ondansetron (ZOFRAN-ODT) 4 mg disintegrating tablet Take 1 tablet (4 mg total) by mouth every 6 (six) hours as needed for nausea or vomiting, Starting Tue 7/10/2018, Normal      !! RESTASIS 0 05 % ophthalmic emulsion INSTILL 1 DROP INTO BOTH EYES TWICE A DAY, Historical Med       !! - Potential duplicate medications found  Please discuss with provider  No discharge procedures on file      PDMP Review       Value Time User    PDMP Reviewed  Yes 9/3/2021  8:20 AM Gil Jenkins MD          ED Provider  Electronically Signed by         Dottie Calvert MD  05/25/22 6444

## 2022-05-25 NOTE — ED ATTENDING ATTESTATION
5/24/2022  Levy CRUZ DO, saw and evaluated the patient  I have discussed the patient with the resident/non-physician practitioner and agree with the resident's/non-physician practitioner's findings, Plan of Care, and MDM as documented in the resident's/non-physician practitioner's note, except where noted  All available labs and Radiology studies were reviewed  I was present for key portions of any procedure(s) performed by the resident/non-physician practitioner and I was immediately available to provide assistance  At this point I agree with the current assessment done in the Emergency Department  I have conducted an independent evaluation of this patient a history and physical is as follows:    [de-identified] year male, coming in as a trauma level see activation for trip and fall over a curb on his weight into the gym today, struck his head on the ground, also struck his left forearm and left knee  He does take aspirin daily and hit his head thus trauma activation  He had no loss of consciousness, no vomiting, no change in mental status  No neck pain  Last tetanus shot by our records was 9 years ago  On exam, he is very well-appearing, his head is atraumatic, normocephalic, he has a abrasion and slight contusion to his nose  He has a moderate-sized hematoma to the dorsum of his left forearm but no bony tenderness or reduced range of motion  Area is mildly tender and boggy  No active drainage or skin openings  There is an abrasion to his left knee  ED Course     CT head performed and negative  His cervical spine was cleared clinically  His hematoma to his left forearm was wrapped and ice applied  Band-Aid already applied to his left knee      Critical Care Time  Procedures

## 2022-07-14 ENCOUNTER — RA CDI HCC (OUTPATIENT)
Dept: OTHER | Facility: HOSPITAL | Age: 87
End: 2022-07-14

## 2022-07-14 NOTE — PROGRESS NOTES
[facility]  coding opportunities       Chart reviewed, no opportunity found:   [address]        Patients Insurance     Medicare Insurance: [employer]

## 2022-07-28 ENCOUNTER — OFFICE VISIT (OUTPATIENT)
Dept: FAMILY MEDICINE CLINIC | Facility: CLINIC | Age: 87
End: 2022-07-28
Payer: COMMERCIAL

## 2022-07-28 VITALS
RESPIRATION RATE: 16 BRPM | OXYGEN SATURATION: 96 % | HEIGHT: 69 IN | BODY MASS INDEX: 27.93 KG/M2 | HEART RATE: 79 BPM | WEIGHT: 188.6 LBS | TEMPERATURE: 97.2 F | DIASTOLIC BLOOD PRESSURE: 68 MMHG | SYSTOLIC BLOOD PRESSURE: 120 MMHG

## 2022-07-28 DIAGNOSIS — N40.1 BPH WITH OBSTRUCTION/LOWER URINARY TRACT SYMPTOMS: ICD-10-CM

## 2022-07-28 DIAGNOSIS — I10 ESSENTIAL HYPERTENSION: Primary | ICD-10-CM

## 2022-07-28 DIAGNOSIS — N13.8 BPH WITH OBSTRUCTION/LOWER URINARY TRACT SYMPTOMS: ICD-10-CM

## 2022-07-28 DIAGNOSIS — E78.2 MIXED HYPERLIPIDEMIA: ICD-10-CM

## 2022-07-28 DIAGNOSIS — H81.4 VERTIGO OF CENTRAL ORIGIN: ICD-10-CM

## 2022-07-28 PROBLEM — R53.1 GENERALIZED WEAKNESS: Status: RESOLVED | Noted: 2018-07-07 | Resolved: 2022-07-28

## 2022-07-28 PROBLEM — Z86.010 HISTORY OF COLONIC POLYPS: Status: ACTIVE | Noted: 2022-07-28

## 2022-07-28 PROBLEM — R42 DIZZINESS: Status: RESOLVED | Noted: 2021-09-02 | Resolved: 2022-07-28

## 2022-07-28 PROBLEM — Z86.0100 HISTORY OF COLONIC POLYPS: Status: ACTIVE | Noted: 2022-07-28

## 2022-07-28 PROBLEM — R07.89 ATYPICAL CHEST PAIN: Status: RESOLVED | Noted: 2018-06-05 | Resolved: 2022-07-28

## 2022-07-28 PROBLEM — K59.00 CONSTIPATION: Status: RESOLVED | Noted: 2018-07-09 | Resolved: 2022-07-28

## 2022-07-28 PROBLEM — E87.1 HYPONATREMIA: Status: RESOLVED | Noted: 2018-07-07 | Resolved: 2022-07-28

## 2022-07-28 PROCEDURE — 1100F PTFALLS ASSESS-DOCD GE2>/YR: CPT

## 2022-07-28 PROCEDURE — 99213 OFFICE O/P EST LOW 20 MIN: CPT

## 2022-07-28 RX ORDER — MECLIZINE HYDROCHLORIDE 25 MG/1
25 TABLET ORAL 3 TIMES DAILY
Qty: 90 TABLET | Refills: 0 | Status: SHIPPED | OUTPATIENT
Start: 2022-07-28

## 2022-07-28 NOTE — PROGRESS NOTES
Assessment/Plan:         Problem List Items Addressed This Visit        Cardiovascular and Mediastinum    Essential hypertension - Primary     Under control  Continue current medication  We will re-evaluate at next office visit  Nervous and Auditory    Vertigo of central origin     Under control  Continue current medication  We will re-evaluate at next office visit  Genitourinary    BPH with obstruction/lower urinary tract symptoms     Under control  Continue current medication  We will re-evaluate at next office visit  Other    Mixed hyperlipidemia     Under control  Continue current medication  We will re-evaluate at next office visit  Other Visit Diagnoses     BMI 27 0-27 9,adult                Subjective:      Patient ID: Silvana Rdz is a 80 y o  male  Patient here for her chronic medical problems and review of the labs and imaging if it is applicable  Currently has no specific complaints other than mentioned in the review of systems        The following portions of the patient's history were reviewed and updated as appropriate:   Past Medical History:  He has a past medical history of BPH with obstruction/lower urinary tract symptoms, Cataract, Diverticulitis, Diverticulosis, Dizziness, Ear problems, Elevated PSA, Hypercholesterolemia, Hypertension, Osteoarthritis, Poor urinary stream, and Vertigo  ,  _______________________________________________________________________  Medical Problems:  does not have any pertinent problems on file ,  _______________________________________________________________________  Past Surgical History:   has a past surgical history that includes Prostate biopsy (2005); Cataract extraction (Left); Knee surgery (Left, 2005); Rotator cuff repair (Right, 06/15/2015); and Colonoscopy (02/16/2004)  ,  _______________________________________________________________________  Family History:  family history includes Cancer in his father; Diabetes in his father and mother; Heart attack in his mother; Heart disease in his mother ,  _______________________________________________________________________  Social History:   reports that he has quit smoking  He has never used smokeless tobacco  He reports current alcohol use of about 14 0 standard drinks of alcohol per week  He reports that he does not use drugs  ,  _______________________________________________________________________  Allergies:  has No Known Allergies     _______________________________________________________________________  Current Outpatient Medications   Medication Sig Dispense Refill    aspirin 81 MG tablet Daily      atorvastatin (LIPITOR) 40 mg tablet Take 1 tablet (40 mg total) by mouth daily at bedtime 30 tablet 0    Cholecalciferol (VITAMIN D3) 2000 units capsule Daily      cycloSPORINE (Restasis) 0 05 % ophthalmic emulsion Restasis 0 05 % eye drops in a dropperette   PUT 1 DROP INTO BOTH EYES TWICE A DAY      lisinopril (ZESTRIL) 20 mg tablet       meclizine (ANTIVERT) 25 mg tablet 3 (three) times a day      clopidogrel (PLAVIX) 75 mg tablet Take 1 tablet (75 mg total) by mouth daily (Patient not taking: Reported on 7/28/2022) 30 tablet 0    meclizine (ANTIVERT) 12 5 MG tablet Take 12 5 mg by mouth 3 (three) times a day as needed   (Patient not taking: Reported on 7/28/2022)      methylprednisolone (MEDROL) 4 mg tablet methylprednisolone 4 mg tablets in a dose pack (Patient not taking: Reported on 7/28/2022)      ondansetron (ZOFRAN-ODT) 4 mg disintegrating tablet Take 1 tablet (4 mg total) by mouth every 6 (six) hours as needed for nausea or vomiting (Patient not taking: Reported on 7/28/2022) 20 tablet 0    RESTASIS 0 05 % ophthalmic emulsion INSTILL 1 DROP INTO BOTH EYES TWICE A DAY       No current facility-administered medications for this visit      _______________________________________________________________________  Review of Systems   Constitutional: Negative for chills and fever  HENT: Negative for congestion, ear pain and sore throat  Eyes: Negative for pain and visual disturbance  Respiratory: Negative for cough and shortness of breath  Cardiovascular: Negative for chest pain and palpitations  Gastrointestinal: Negative for abdominal pain and vomiting  Genitourinary: Negative for difficulty urinating, dysuria, frequency and hematuria  Musculoskeletal: Negative for arthralgias and back pain  Skin: Negative for color change and rash  Neurological: Negative for dizziness, seizures, syncope, light-headedness and headaches  Psychiatric/Behavioral: Negative for agitation and behavioral problems  All other systems reviewed and are negative  Objective:  Vitals:    07/28/22 1053   BP: 120/68   BP Location: Right arm   Patient Position: Sitting   Cuff Size: Standard   Pulse: 79   Resp: 16   Temp: (!) 97 2 °F (36 2 °C)   TempSrc: Tympanic   SpO2: 96%   Weight: 85 5 kg (188 lb 9 6 oz)   Height: 5' 9" (1 753 m)     Body mass index is 27 85 kg/m²  Physical Exam  Vitals reviewed  Constitutional:       Appearance: Normal appearance  HENT:      Head: Normocephalic and atraumatic  Right Ear: Tympanic membrane normal       Left Ear: Tympanic membrane normal       Nose: Nose normal       Mouth/Throat:      Mouth: Mucous membranes are moist    Eyes:      Conjunctiva/sclera: Conjunctivae normal       Pupils: Pupils are equal, round, and reactive to light  Cardiovascular:      Rate and Rhythm: Normal rate and regular rhythm  Heart sounds: Normal heart sounds  Pulmonary:      Effort: Pulmonary effort is normal       Breath sounds: Normal breath sounds  Abdominal:      General: Abdomen is flat  Bowel sounds are normal       Palpations: Abdomen is soft  Musculoskeletal:         General: Normal range of motion  Cervical back: Normal range of motion and neck supple     Skin:     General: Skin is warm and dry  Capillary Refill: Capillary refill takes 2 to 3 seconds  Neurological:      General: No focal deficit present  Mental Status: He is alert and oriented to person, place, and time  Psychiatric:         Mood and Affect: Mood normal        BMI Counseling: Body mass index is 27 85 kg/m²  The BMI is above normal  Nutrition recommendations include decreasing portion sizes, decreasing fast food intake, limiting drinks that contain sugar, moderation in carbohydrate intake, increasing intake of lean protein and reducing intake of saturated and trans fat  Exercise recommendations include exercising 3-5 times per week  No pharmacotherapy was ordered  Rationale for BMI follow-up plan is due to patient being overweight or obese

## 2022-07-28 NOTE — PATIENT INSTRUCTIONS
Frequent home monitor of blood pressure  Diet high in fruit and vegetables and low in salt and cholesterol  Regular cardiovascular exercise  Take all medications regularly as prescribed  Loose weight   Potential consequences of obesity explained to patient

## 2022-08-13 DIAGNOSIS — I10 ESSENTIAL (PRIMARY) HYPERTENSION: ICD-10-CM

## 2022-08-15 RX ORDER — LISINOPRIL 20 MG/1
TABLET ORAL
Qty: 90 TABLET | Refills: 0 | Status: SHIPPED | OUTPATIENT
Start: 2022-08-15

## 2022-10-02 DIAGNOSIS — I65.29 CAROTID STENOSIS: ICD-10-CM

## 2022-10-03 RX ORDER — ATORVASTATIN CALCIUM 40 MG/1
TABLET, FILM COATED ORAL
Qty: 90 TABLET | Refills: 0 | Status: SHIPPED | OUTPATIENT
Start: 2022-10-03

## 2022-10-28 ENCOUNTER — OFFICE VISIT (OUTPATIENT)
Dept: FAMILY MEDICINE CLINIC | Facility: CLINIC | Age: 87
End: 2022-10-28
Payer: COMMERCIAL

## 2022-10-28 VITALS
RESPIRATION RATE: 16 BRPM | SYSTOLIC BLOOD PRESSURE: 120 MMHG | HEART RATE: 59 BPM | HEIGHT: 69 IN | TEMPERATURE: 97.9 F | OXYGEN SATURATION: 98 % | BODY MASS INDEX: 28.14 KG/M2 | DIASTOLIC BLOOD PRESSURE: 64 MMHG | WEIGHT: 190 LBS

## 2022-10-28 DIAGNOSIS — Z23 ENCOUNTER FOR IMMUNIZATION: ICD-10-CM

## 2022-10-28 DIAGNOSIS — E78.2 MIXED HYPERLIPIDEMIA: ICD-10-CM

## 2022-10-28 DIAGNOSIS — E55.9 VITAMIN D DEFICIENCY: ICD-10-CM

## 2022-10-28 DIAGNOSIS — N40.1 BPH WITH OBSTRUCTION/LOWER URINARY TRACT SYMPTOMS: ICD-10-CM

## 2022-10-28 DIAGNOSIS — I10 ESSENTIAL HYPERTENSION: Primary | ICD-10-CM

## 2022-10-28 DIAGNOSIS — N13.8 BPH WITH OBSTRUCTION/LOWER URINARY TRACT SYMPTOMS: ICD-10-CM

## 2022-10-28 PROCEDURE — 90662 IIV NO PRSV INCREASED AG IM: CPT

## 2022-10-28 PROCEDURE — 99213 OFFICE O/P EST LOW 20 MIN: CPT

## 2022-10-28 PROCEDURE — G0008 ADMIN INFLUENZA VIRUS VAC: HCPCS

## 2022-10-28 NOTE — PROGRESS NOTES
Assessment/Plan:         Problem List Items Addressed This Visit        Cardiovascular and Mediastinum    Essential hypertension - Primary     Under control  Continue current medication  We will re-evaluate at next office visit  Relevant Orders    CBC and differential    Comprehensive metabolic panel    UA w Reflex to Microscopic w Reflex to Culture       Genitourinary    BPH with obstruction/lower urinary tract symptoms     Under control  Continue current medication  We will re-evaluate at next office visit  Other    Mixed hyperlipidemia     Under control  Continue current medication  We will re-evaluate at next office visit  Relevant Orders    Lipid Panel with Direct LDL reflex      Other Visit Diagnoses     Vitamin D deficiency        Relevant Orders    Vitamin D 25 hydroxy    Encounter for immunization        Relevant Orders    influenza vaccine, high-dose, PF 0 7 mL (FLUZONE HIGH-DOSE) (Completed)            Subjective:      Patient ID: Anuradha Law is a 80 y o  male  Patient here for review of chronic medical problems and review of the labs and imaging if it is applicable  Currently has no specific complaints other than mentioned in the review of systems  Denies chest pain, SOB, cough, abdominal pain, nausea, vomiting, fever, chills, lightheadedness, dizziness,headache, tingling or numbness  No bowel or bladder problem  The following portions of the patient's history were reviewed and updated as appropriate:   Past Medical History:  He has a past medical history of BPH with obstruction/lower urinary tract symptoms, Cataract, Diverticulitis, Diverticulosis, Dizziness, Ear problems, Elevated PSA, Hypercholesterolemia, Hypertension, Osteoarthritis, Poor urinary stream, and Vertigo  ,  _______________________________________________________________________  Medical Problems:  does not have any pertinent problems on file ,  _______________________________________________________________________  Past Surgical History:   has a past surgical history that includes Prostate biopsy (2005); Cataract extraction (Left); Knee surgery (Left, 2005); Rotator cuff repair (Right, 06/15/2015); and Colonoscopy (02/16/2004)  ,  _______________________________________________________________________  Family History:  family history includes Cancer in his father; Diabetes in his father and mother; Heart attack in his mother; Heart disease in his mother ,  _______________________________________________________________________  Social History:   reports that he has quit smoking  He has never used smokeless tobacco  He reports current alcohol use of about 14 0 standard drinks of alcohol per week  He reports that he does not use drugs  ,  _______________________________________________________________________  Allergies:  has No Known Allergies     _______________________________________________________________________  Current Outpatient Medications   Medication Sig Dispense Refill   • aspirin 81 MG tablet Daily     • atorvastatin (LIPITOR) 40 mg tablet TAKE 1 TABLET BY MOUTH EVERYDAY AT BEDTIME 90 tablet 0   • Cholecalciferol (VITAMIN D3) 2000 units capsule Daily     • lisinopril (ZESTRIL) 20 mg tablet TAKE 1 TABLET BY MOUTH EVERY DAY 90 tablet 0   • meclizine (ANTIVERT) 25 mg tablet Take 1 tablet (25 mg total) by mouth 3 (three) times a day 90 tablet 0   • cycloSPORINE (Restasis) 0 05 % ophthalmic emulsion Restasis 0 05 % eye drops in a dropperette   PUT 1 DROP INTO BOTH EYES TWICE A DAY       No current facility-administered medications for this visit      _______________________________________________________________________  Review of Systems   Constitutional: Negative for chills, fatigue and fever  HENT: Negative for congestion, ear pain, rhinorrhea, sneezing and sore throat  Eyes: Negative for redness, itching and visual disturbance  Respiratory: Negative for cough, chest tightness and shortness of breath  Cardiovascular: Negative for chest pain, palpitations and leg swelling  Gastrointestinal: Negative for abdominal pain, blood in stool, diarrhea, nausea and vomiting  Endocrine: Negative for cold intolerance and heat intolerance  Genitourinary: Negative for dysuria, frequency and urgency  Musculoskeletal: Negative for arthralgias, back pain and myalgias  Skin: Negative for color change and rash  Neurological: Positive for dizziness (Occasional)  Negative for weakness, light-headedness, numbness and headaches  Hematological: Does not bruise/bleed easily  Psychiatric/Behavioral: Negative for agitation, behavioral problems and confusion  Objective:  Vitals:    10/28/22 1214   BP: 120/64   BP Location: Left arm   Patient Position: Sitting   Cuff Size: Standard   Pulse: 59   Resp: 16   Temp: 97 9 °F (36 6 °C)   TempSrc: Tympanic   SpO2: 98%   Weight: 86 2 kg (190 lb)   Height: 5' 9" (1 753 m)     Body mass index is 28 06 kg/m²  Physical Exam  Vitals and nursing note reviewed  Constitutional:       General: He is not in acute distress  Appearance: Normal appearance  He is not ill-appearing, toxic-appearing or diaphoretic  HENT:      Head: Normocephalic and atraumatic  Right Ear: Tympanic membrane normal       Left Ear: Tympanic membrane normal       Nose: Nose normal  No congestion  Mouth/Throat:      Mouth: Mucous membranes are moist    Eyes:      General: No scleral icterus  Right eye: No discharge  Left eye: No discharge  Extraocular Movements: Extraocular movements intact  Conjunctiva/sclera: Conjunctivae normal       Pupils: Pupils are equal, round, and reactive to light  Cardiovascular:      Rate and Rhythm: Normal rate and regular rhythm  Pulses: Normal pulses  Heart sounds: Normal heart sounds  No murmur heard  No gallop     Pulmonary:      Effort: Pulmonary effort is normal  No respiratory distress  Breath sounds: Normal breath sounds  No wheezing, rhonchi or rales  Abdominal:      General: Abdomen is flat  Bowel sounds are normal  There is no distension  Palpations: Abdomen is soft  Tenderness: There is no abdominal tenderness  There is no guarding  Musculoskeletal:         General: No swelling or tenderness  Normal range of motion  Cervical back: Normal range of motion and neck supple  No rigidity  Lymphadenopathy:      Cervical: No cervical adenopathy  Skin:     General: Skin is warm  Capillary Refill: Capillary refill takes 2 to 3 seconds  Coloration: Skin is not jaundiced  Findings: No bruising or rash  Neurological:      General: No focal deficit present  Mental Status: He is alert and oriented to person, place, and time  Mental status is at baseline        Gait: Gait normal    Psychiatric:         Mood and Affect: Mood normal

## 2022-11-12 DIAGNOSIS — I10 ESSENTIAL (PRIMARY) HYPERTENSION: ICD-10-CM

## 2022-11-14 RX ORDER — LISINOPRIL 20 MG/1
TABLET ORAL
Qty: 90 TABLET | Refills: 0 | Status: SHIPPED | OUTPATIENT
Start: 2022-11-14

## 2022-12-31 DIAGNOSIS — I65.29 CAROTID STENOSIS: ICD-10-CM

## 2023-01-03 RX ORDER — ATORVASTATIN CALCIUM 40 MG/1
TABLET, FILM COATED ORAL
Qty: 90 TABLET | Refills: 0 | Status: SHIPPED | OUTPATIENT
Start: 2023-01-03

## 2023-01-13 ENCOUNTER — APPOINTMENT (OUTPATIENT)
Dept: LAB | Facility: CLINIC | Age: 88
End: 2023-01-13

## 2023-01-13 DIAGNOSIS — I10 ESSENTIAL HYPERTENSION: ICD-10-CM

## 2023-01-13 DIAGNOSIS — E78.2 MIXED HYPERLIPIDEMIA: ICD-10-CM

## 2023-01-13 DIAGNOSIS — E55.9 VITAMIN D DEFICIENCY: ICD-10-CM

## 2023-01-13 LAB
25(OH)D3 SERPL-MCNC: 48.9 NG/ML (ref 30–100)
ALBUMIN SERPL BCP-MCNC: 3.4 G/DL (ref 3.5–5)
ALP SERPL-CCNC: 67 U/L (ref 46–116)
ALT SERPL W P-5'-P-CCNC: 28 U/L (ref 12–78)
ANION GAP SERPL CALCULATED.3IONS-SCNC: 6 MMOL/L (ref 4–13)
AST SERPL W P-5'-P-CCNC: 27 U/L (ref 5–45)
BASOPHILS # BLD AUTO: 0.04 THOUSANDS/ÂΜL (ref 0–0.1)
BASOPHILS NFR BLD AUTO: 1 % (ref 0–1)
BILIRUB SERPL-MCNC: 0.83 MG/DL (ref 0.2–1)
BILIRUB UR QL STRIP: NEGATIVE
BUN SERPL-MCNC: 23 MG/DL (ref 5–25)
CALCIUM ALBUM COR SERPL-MCNC: 9.5 MG/DL (ref 8.3–10.1)
CALCIUM SERPL-MCNC: 9 MG/DL (ref 8.3–10.1)
CHLORIDE SERPL-SCNC: 110 MMOL/L (ref 96–108)
CHOLEST SERPL-MCNC: 109 MG/DL
CLARITY UR: CLEAR
CO2 SERPL-SCNC: 25 MMOL/L (ref 21–32)
COLOR UR: NORMAL
CREAT SERPL-MCNC: 1.39 MG/DL (ref 0.6–1.3)
EOSINOPHIL # BLD AUTO: 0.16 THOUSAND/ÂΜL (ref 0–0.61)
EOSINOPHIL NFR BLD AUTO: 3 % (ref 0–6)
ERYTHROCYTE [DISTWIDTH] IN BLOOD BY AUTOMATED COUNT: 12.6 % (ref 11.6–15.1)
GFR SERPL CREATININE-BSD FRML MDRD: 45 ML/MIN/1.73SQ M
GLUCOSE P FAST SERPL-MCNC: 113 MG/DL (ref 65–99)
GLUCOSE UR STRIP-MCNC: NEGATIVE MG/DL
HCT VFR BLD AUTO: 43.9 % (ref 36.5–49.3)
HDLC SERPL-MCNC: 41 MG/DL
HGB BLD-MCNC: 14.5 G/DL (ref 12–17)
HGB UR QL STRIP.AUTO: NEGATIVE
IMM GRANULOCYTES # BLD AUTO: 0.01 THOUSAND/UL (ref 0–0.2)
IMM GRANULOCYTES NFR BLD AUTO: 0 % (ref 0–2)
KETONES UR STRIP-MCNC: NEGATIVE MG/DL
LDLC SERPL CALC-MCNC: 37 MG/DL (ref 0–100)
LEUKOCYTE ESTERASE UR QL STRIP: NEGATIVE
LYMPHOCYTES # BLD AUTO: 2.46 THOUSANDS/ÂΜL (ref 0.6–4.47)
LYMPHOCYTES NFR BLD AUTO: 39 % (ref 14–44)
MCH RBC QN AUTO: 31.7 PG (ref 26.8–34.3)
MCHC RBC AUTO-ENTMCNC: 33 G/DL (ref 31.4–37.4)
MCV RBC AUTO: 96 FL (ref 82–98)
MONOCYTES # BLD AUTO: 0.61 THOUSAND/ÂΜL (ref 0.17–1.22)
MONOCYTES NFR BLD AUTO: 10 % (ref 4–12)
NEUTROPHILS # BLD AUTO: 3.11 THOUSANDS/ÂΜL (ref 1.85–7.62)
NEUTS SEG NFR BLD AUTO: 47 % (ref 43–75)
NITRITE UR QL STRIP: NEGATIVE
NRBC BLD AUTO-RTO: 0 /100 WBCS
PH UR STRIP.AUTO: 6 [PH]
PLATELET # BLD AUTO: 213 THOUSANDS/UL (ref 149–390)
PMV BLD AUTO: 10.3 FL (ref 8.9–12.7)
POTASSIUM SERPL-SCNC: 4.5 MMOL/L (ref 3.5–5.3)
PROT SERPL-MCNC: 6.8 G/DL (ref 6.4–8.4)
PROT UR STRIP-MCNC: NEGATIVE MG/DL
RBC # BLD AUTO: 4.58 MILLION/UL (ref 3.88–5.62)
SODIUM SERPL-SCNC: 141 MMOL/L (ref 135–147)
SP GR UR STRIP.AUTO: 1.01 (ref 1–1.03)
TRIGL SERPL-MCNC: 157 MG/DL
UROBILINOGEN UR STRIP-ACNC: <2 MG/DL
WBC # BLD AUTO: 6.39 THOUSAND/UL (ref 4.31–10.16)

## 2023-01-22 ENCOUNTER — RA CDI HCC (OUTPATIENT)
Dept: OTHER | Facility: HOSPITAL | Age: 88
End: 2023-01-22

## 2023-01-22 NOTE — PROGRESS NOTES
[facility]  coding opportunities       Chart reviewed, no opportunity found:   [address]        Patients Insurance     Medicare Insurance: [employer]

## 2023-01-30 ENCOUNTER — OFFICE VISIT (OUTPATIENT)
Dept: FAMILY MEDICINE CLINIC | Facility: CLINIC | Age: 88
End: 2023-01-30

## 2023-01-30 VITALS
HEART RATE: 70 BPM | OXYGEN SATURATION: 92 % | RESPIRATION RATE: 16 BRPM | BODY MASS INDEX: 28.14 KG/M2 | HEIGHT: 69 IN | TEMPERATURE: 98 F | WEIGHT: 190 LBS | SYSTOLIC BLOOD PRESSURE: 112 MMHG | DIASTOLIC BLOOD PRESSURE: 64 MMHG

## 2023-01-30 DIAGNOSIS — I65.23 BILATERAL CAROTID ARTERY STENOSIS: ICD-10-CM

## 2023-01-30 DIAGNOSIS — Z00.00 MEDICARE ANNUAL WELLNESS VISIT, SUBSEQUENT: Primary | ICD-10-CM

## 2023-01-30 DIAGNOSIS — E78.2 MIXED HYPERLIPIDEMIA: ICD-10-CM

## 2023-01-30 DIAGNOSIS — I10 ESSENTIAL HYPERTENSION: ICD-10-CM

## 2023-01-30 PROBLEM — R79.89 RAISED TSH LEVEL: Status: RESOLVED | Noted: 2021-09-02 | Resolved: 2023-01-30

## 2023-01-30 PROBLEM — G89.29 CHRONIC MIDLINE LOW BACK PAIN WITH BILATERAL SCIATICA: Status: RESOLVED | Noted: 2021-09-30 | Resolved: 2023-01-30

## 2023-01-30 PROBLEM — M54.42 CHRONIC MIDLINE LOW BACK PAIN WITH BILATERAL SCIATICA: Status: RESOLVED | Noted: 2021-09-30 | Resolved: 2023-01-30

## 2023-01-30 PROBLEM — N13.8 BPH WITH OBSTRUCTION/LOWER URINARY TRACT SYMPTOMS: Status: RESOLVED | Noted: 2018-06-05 | Resolved: 2023-01-30

## 2023-01-30 PROBLEM — D69.6 THROMBOCYTOPENIA (HCC): Status: RESOLVED | Noted: 2018-07-07 | Resolved: 2023-01-30

## 2023-01-30 PROBLEM — M54.41 CHRONIC MIDLINE LOW BACK PAIN WITH BILATERAL SCIATICA: Status: RESOLVED | Noted: 2021-09-30 | Resolved: 2023-01-30

## 2023-01-30 PROBLEM — N40.1 BPH WITH OBSTRUCTION/LOWER URINARY TRACT SYMPTOMS: Status: RESOLVED | Noted: 2018-06-05 | Resolved: 2023-01-30

## 2023-01-30 NOTE — PATIENT INSTRUCTIONS
Medicare Preventive Visit Patient Instructions  Thank you for completing your Welcome to Medicare Visit or Medicare Annual Wellness Visit today  Your next wellness visit will be due in one year (1/31/2024)  The screening/preventive services that you may require over the next 5-10 years are detailed below  Some tests may not apply to you based off risk factors and/or age  Screening tests ordered at today's visit but not completed yet may show as past due  Also, please note that scanned in results may not display below  Preventive Screenings:  Service Recommendations Previous Testing/Comments   Colorectal Cancer Screening  · Colonoscopy    · Fecal Occult Blood Test (FOBT)/Fecal Immunochemical Test (FIT)  · Fecal DNA/Cologuard Test  · Flexible Sigmoidoscopy Age: 39-70 years old   Colonoscopy: every 10 years (May be performed more frequently if at higher risk)  OR  FOBT/FIT: every 1 year  OR  Cologuard: every 3 years  OR  Sigmoidoscopy: every 5 years  Screening may be recommended earlier than age 39 if at higher risk for colorectal cancer  Also, an individualized decision between you and your healthcare provider will decide whether screening between the ages of 74-80 would be appropriate  Colonoscopy: Not on file  FOBT/FIT: Not on file  Cologuard: Not on file  Sigmoidoscopy: Not on file          Prostate Cancer Screening Individualized decision between patient and health care provider in men between ages of 53-78   Medicare will cover every 12 months beginning on the day after your 50th birthday PSA: 6 9 ng/mL           Hepatitis C Screening Once for adults born between 1945 and 1965  More frequently in patients at high risk for Hepatitis C Hep C Antibody: Not on file        Diabetes Screening 1-2 times per year if you're at risk for diabetes or have pre-diabetes Fasting glucose: 113 mg/dL (1/13/2023)  A1C: 5 9 % (9/2/2021)      Cholesterol Screening Once every 5 years if you don't have a lipid disorder   May order more often based on risk factors  Lipid panel: 01/13/2023         Other Preventive Screenings Covered by Medicare:  1  Abdominal Aortic Aneurysm (AAA) Screening: covered once if your at risk  You're considered to be at risk if you have a family history of AAA or a male between the age of 73-68 who smoking at least 100 cigarettes in your lifetime  2  Lung Cancer Screening: covers low dose CT scan once per year if you meet all of the following conditions: (1) Age 50-69; (2) No signs or symptoms of lung cancer; (3) Current smoker or have quit smoking within the last 15 years; (4) You have a tobacco smoking history of at least 20 pack years (packs per day x number of years you smoked); (5) You get a written order from a healthcare provider  3  Glaucoma Screening: covered annually if you're considered high risk: (1) You have diabetes OR (2) Family history of glaucoma OR (3)  aged 48 and older OR (3)  American aged 72 and older  3  Osteoporosis Screening: covered every 2 years if you meet one of the following conditions: (1) Have a vertebral abnormality; (2) On glucocorticoid therapy for more than 3 months; (3) Have primary hyperparathyroidism; (4) On osteoporosis medications and need to assess response to drug therapy  5  HIV Screening: covered annually if you're between the age of 12-76  Also covered annually if you are younger than 13 and older than 72 with risk factors for HIV infection  For pregnant patients, it is covered up to 3 times per pregnancy      Immunizations:  Immunization Recommendations   Influenza Vaccine Annual influenza vaccination during flu season is recommended for all persons aged >= 6 months who do not have contraindications   Pneumococcal Vaccine   * Pneumococcal conjugate vaccine = PCV13 (Prevnar 13), PCV15 (Vaxneuvance), PCV20 (Prevnar 20)  * Pneumococcal polysaccharide vaccine = PPSV23 (Pneumovax) Adults 25-60 years old: 1-3 doses may be recommended based on certain risk factors  Adults 72 years old: 1-2 doses may be recommended based off what pneumonia vaccine you previously received   Hepatitis B Vaccine 3 dose series if at intermediate or high risk (ex: diabetes, end stage renal disease, liver disease)   Tetanus (Td) Vaccine - COST NOT COVERED BY MEDICARE PART B Following completion of primary series, a booster dose should be given every 10 years to maintain immunity against tetanus  Td may also be given as tetanus wound prophylaxis  Tdap Vaccine - COST NOT COVERED BY MEDICARE PART B Recommended at least once for all adults  For pregnant patients, recommended with each pregnancy  Shingles Vaccine (Shingrix) - COST NOT COVERED BY MEDICARE PART B  2 shot series recommended in those aged 48 and above     Health Maintenance Due:  There are no preventive care reminders to display for this patient  Immunizations Due:      Topic Date Due   • COVID-19 Vaccine (5 - Booster for Pfizer series) 07/04/2022     Advance Directives   What are advance directives? Advance directives are legal documents that state your wishes and plans for medical care  These plans are made ahead of time in case you lose your ability to make decisions for yourself  Advance directives can apply to any medical decision, such as the treatments you want, and if you want to donate organs  What are the types of advance directives? There are many types of advance directives, and each state has rules about how to use them  You may choose a combination of any of the following:  · Living will: This is a written record of the treatment you want  You can also choose which treatments you do not want, which to limit, and which to stop at a certain time  This includes surgery, medicine, IV fluid, and tube feedings  · Durable power of  for healthcare [facility]): This is a written record that states who you want to make healthcare choices for you when you are unable to make them for yourself   This person, called a proxy, is usually a family member or a friend  You may choose more than 1 proxy  · Do not resuscitate (DNR) order:  A DNR order is used in case your heart stops beating or you stop breathing  It is a request not to have certain forms of treatment, such as CPR  A DNR order may be included in other types of advance directives  · Medical directive: This covers the care that you want if you are in a coma, near death, or unable to make decisions for yourself  You can list the treatments you want for each condition  Treatment may include pain medicine, surgery, blood transfusions, dialysis, IV or tube feedings, and a ventilator (breathing machine)  · Values history: This document has questions about your views, beliefs, and how you feel and think about life  This information can help others choose the care that you would choose  Why are advance directives important? An advance directive helps you control your care  Although spoken wishes may be used, it is better to have your wishes written down  Spoken wishes can be misunderstood, or not followed  Treatments may be given even if you do not want them  An advance directive may make it easier for your family to make difficult choices about your care  Weight Management   Why it is important to manage your weight:  Being overweight increases your risk of health conditions such as heart disease, high blood pressure, type 2 diabetes, and certain types of cancer  It can also increase your risk for osteoarthritis, sleep apnea, and other respiratory problems  Aim for a slow, steady weight loss  Even a small amount of weight loss can lower your risk of health problems  How to lose weight safely:  A safe and healthy way to lose weight is to eat fewer calories and get regular exercise  You can lose up about 1 pound a week by decreasing the number of calories you eat by 500 calories each day     Healthy meal plan for weight management:  A healthy meal plan includes a variety of foods, contains fewer calories, and helps you stay healthy  A healthy meal plan includes the following:  · Eat whole-grain foods more often  A healthy meal plan should contain fiber  Fiber is the part of grains, fruits, and vegetables that is not broken down by your body  Whole-grain foods are healthy and provide extra fiber in your diet  Some examples of whole-grain foods are whole-wheat breads and pastas, oatmeal, brown rice, and bulgur  · Eat a variety of vegetables every day  Include dark, leafy greens such as spinach, kale, damaris greens, and mustard greens  Eat yellow and orange vegetables such as carrots, sweet potatoes, and winter squash  · Eat a variety of fruits every day  Choose fresh or canned fruit (canned in its own juice or light syrup) instead of juice  Fruit juice has very little or no fiber  · Eat low-fat dairy foods  Drink fat-free (skim) milk or 1% milk  Eat fat-free yogurt and low-fat cottage cheese  Try low-fat cheeses such as mozzarella and other reduced-fat cheeses  · Choose meat and other protein foods that are low in fat  Choose beans or other legumes such as split peas or lentils  Choose fish, skinless poultry (chicken or turkey), or lean cuts of red meat (beef or pork)  Before you cook meat or poultry, cut off any visible fat  · Use less fat and oil  Try baking foods instead of frying them  Add less fat, such as margarine, sour cream, regular salad dressing and mayonnaise to foods  Eat fewer high-fat foods  Some examples of high-fat foods include french fries, doughnuts, ice cream, and cakes  · Eat fewer sweets  Limit foods and drinks that are high in sugar  This includes candy, cookies, regular soda, and sweetened drinks  Exercise:  Exercise at least 30 minutes per day on most days of the week  Some examples of exercise include walking, biking, dancing, and swimming   You can also fit in more physical activity by taking the stairs instead of the elevator or parking farther away from stores  Ask your healthcare provider about the best exercise plan for you  © Copyright [employer] 2018 Information is for End User's use only and may not be sold, redistributed or otherwise used for commercial purposes   All illustrations and images included in CareNotes® are the copyrighted property of [employer] M [employer]  or [address] [place] [address]

## 2023-01-30 NOTE — PROGRESS NOTES
Assessment and Plan:     Problem List Items Addressed This Visit        Cardiovascular and Mediastinum    Essential hypertension     Under control  Continue current medication  We will re-evaluate at next office visit  Bilateral carotid artery stenosis     Under control  Continue current medication  We will re-evaluate at next office visit  Other    Mixed hyperlipidemia     Under control  Continue current medication  We will re-evaluate at next office visit  Other Visit Diagnoses     Medicare annual wellness visit, subsequent    -  Primary        BMI Counseling: Body mass index is 28 06 kg/m²  The BMI is above normal  Nutrition recommendations include decreasing portion sizes, decreasing fast food intake, limiting drinks that contain sugar, moderation in carbohydrate intake, increasing intake of lean protein and reducing intake of saturated and trans fat  Exercise recommendations include vigorous physical activity 75 minutes/week  No pharmacotherapy was ordered  Rationale for BMI follow-up plan is due to patient being overweight or obese  Depression Screening and Follow-up Plan: Patient was screened for depression during today's encounter  They screened negative with a PHQ-2 score of 0  Preventive health issues were discussed with patient, and age appropriate screening tests were ordered as noted in patient's After Visit Summary  Personalized health advice and appropriate referrals for health education or preventive services given if needed, as noted in patient's After Visit Summary  History of Present Illness:     Patient presents for a Medicare Wellness Visit    Patient here for review of chronic medical problems and review of the labs and imaging if it is applicable    Currently has no specific complaints other than mentioned in the review of systems  Denies chest pain, SOB, cough, abdominal pain, nausea, vomiting, fever, chills, lightheadedness, ,headache, tingling or numbness  No bowel or bladder problem  Has occasional dizziness and still have visual disturbances     Patient Care Team:  Braden Oswald MD as PCP - General (Internal Medicine)     Review of Systems:     Review of Systems   Constitutional: Negative for chills, fatigue and fever  HENT: Negative for congestion, ear pain, rhinorrhea, sneezing and sore throat  Eyes: Positive for visual disturbance  Negative for redness and itching  Respiratory: Negative for cough, chest tightness and shortness of breath  Cardiovascular: Negative for chest pain, palpitations and leg swelling  Gastrointestinal: Negative for abdominal pain, blood in stool, diarrhea, nausea and vomiting  Endocrine: Negative for cold intolerance and heat intolerance  Genitourinary: Negative for dysuria, frequency and urgency  Musculoskeletal: Negative for arthralgias, back pain and myalgias  Skin: Negative for color change and rash  Neurological: Positive for dizziness  Negative for weakness, light-headedness, numbness and headaches  Hematological: Does not bruise/bleed easily  Psychiatric/Behavioral: Negative for agitation, behavioral problems and confusion          Problem List:     Patient Active Problem List   Diagnosis   • Essential hypertension   • Lipoma of axilla   • Mixed hyperlipidemia   • Elevated PSA   • Other microscopic hematuria   • Vertigo of central origin   • Bilateral sensorineural hearing loss   • Vertebral artery stenosis, left   • Bilateral carotid artery stenosis   • Visual changes   • Downbeat nystagmus   • History of colonic polyps      Past Medical and Surgical History:     Past Medical History:   Diagnosis Date   • BPH with obstruction/lower urinary tract symptoms    • Cataract    • Diverticulitis    • Diverticulosis    • Dizziness    • Ear problems    • Elevated PSA    • Hypercholesterolemia    • Hypertension    • Osteoarthritis    • Poor urinary stream    • Vertigo      Past Surgical History:   Procedure Laterality Date   • CATARACT EXTRACTION Left    • COLONOSCOPY  02/16/2004   • KNEE SURGERY Left 2005   • PROSTATE BIOPSY  2005   • ROTATOR CUFF REPAIR Right 06/15/2015      Family History:     Family History   Problem Relation Age of Onset   • Diabetes Mother    • Heart disease Mother    • Heart attack Mother    • Cancer Father    • Diabetes Father       Social History:     Social History     Socioeconomic History   • Marital status: /Civil Union     Spouse name: None   • Number of children: None   • Years of education: None   • Highest education level: None   Occupational History   • None   Tobacco Use   • Smoking status: Former     Passive exposure: Past   • Smokeless tobacco: Never   Vaping Use   • Vaping Use: Never used   Substance and Sexual Activity   • Alcohol use: Yes     Alcohol/week: 14 0 standard drinks     Types: 14 Cans of beer per week     Comment: 1-2 beers a day   • Drug use: No   • Sexual activity: Not Currently   Other Topics Concern   • None   Social History Narrative   • None     Social Determinants of Health     Financial Resource Strain: Low Risk    • Difficulty of Paying Living Expenses: Not very hard   Food Insecurity: Not on file   Transportation Needs: No Transportation Needs   • Lack of Transportation (Medical): No   • Lack of Transportation (Non-Medical):  No   Physical Activity: Not on file   Stress: Not on file   Social Connections: Not on file   Intimate Partner Violence: Not on file   Housing Stability: Not on file      Medications and Allergies:     Current Outpatient Medications   Medication Sig Dispense Refill   • aspirin 81 MG tablet Daily     • atorvastatin (LIPITOR) 40 mg tablet TAKE 1 TABLET BY MOUTH EVERYDAY AT BEDTIME 90 tablet 0   • Cholecalciferol (VITAMIN D3) 2000 units capsule Daily     • cycloSPORINE (Restasis) 0 05 % ophthalmic emulsion Restasis 0 05 % eye drops in a dropperette   PUT 1 DROP INTO BOTH EYES TWICE A DAY     • lisinopril (ZESTRIL) 20 mg tablet TAKE 1 TABLET BY MOUTH EVERY DAY 90 tablet 0   • meclizine (ANTIVERT) 25 mg tablet Take 1 tablet (25 mg total) by mouth 3 (three) times a day 90 tablet 0     No current facility-administered medications for this visit  No Known Allergies   Immunizations:     Immunization History   Administered Date(s) Administered   • COVID-19 PFIZER VACCINE 0 3 ML IM 01/20/2021, 02/10/2021, 10/22/2021, 05/09/2022   • INFLUENZA 04/02/2003, 10/07/2013, 10/31/2014, 10/31/2014, 10/14/2015, 11/01/2016, 10/20/2017   • Influenza Split High Dose Preservative Free IM 10/14/2015, 11/01/2016, 10/20/2017, 10/17/2018, 10/04/2019   • Influenza, high dose seasonal 0 7 mL 10/21/2020, 10/12/2021, 10/28/2022   • Influenza, seasonal, injectable, preservative free 10/07/2013   • Pneumococcal Conjugate 13-Valent 01/23/2016   • Pneumococcal Polysaccharide PPV23 01/25/2013   • Tdap 01/25/2013, 05/24/2022   • Zoster 07/15/2008      Health Maintenance: There are no preventive care reminders to display for this patient  Topic Date Due   • COVID-19 Vaccine (5 - Booster for Pfizer series) 07/04/2022      Medicare Screening Tests and Risk Assessments:     Bard Mosley is here for his Subsequent Wellness visit  Last Medicare Wellness visit information reviewed, patient interviewed and updates made to the record today  Health Risk Assessment:   Patient rates overall health as good  Patient feels that their physical health rating is slightly better  Patient is satisfied with their life  Eyesight was rated as same  Hearing was rated as same  Patient feels that their emotional and mental health rating is same  Patients states they are never, rarely angry  Patient states they are never, rarely unusually tired/fatigued  Pain experienced in the last 7 days has been some  Patient's pain rating has been 3/10  Patient states that he has experienced no weight loss or gain in last 6 months  Fall Risk Screening:    In the past year, patient has experienced: history of falling in past year    Number of falls: 1  Injured during fall?: No    Feels unsteady when standing or walking?: Yes    Worried about falling?: Yes      Home Safety:  Patient has trouble with stairs inside or outside of their home  Patient has working smoke alarms and has working carbon monoxide detector  Home safety hazards include: none  Nutrition:   Current diet is Limited junk food  Medications:   Patient is currently taking over-the-counter supplements  OTC medications include: see medication list  Patient is able to manage medications  Activities of Daily Living (ADLs)/Instrumental Activities of Daily Living (IADLs):   Walk and transfer into and out of bed and chair?: Yes  Dress and groom yourself?: Yes    Bathe or shower yourself?: Yes    Feed yourself?  Yes  Do your laundry/housekeeping?: Yes  Manage your money, pay your bills and track your expenses?: Yes  Make your own meals?: Yes    Do your own shopping?: Yes    Previous Hospitalizations:   Any hospitalizations or ED visits within the last 12 months?: Yes    How many hospitalizations have you had in the last year?: 1-2    Hospitalization Comments: S/p fall with contusions to left arm    Advance Care Planning:   Living will: No    Durable POA for healthcare: No    Advanced directive: No    Advanced directive counseling given: Yes    Five wishes given: Yes    Patient declined ACP directive: No    End of Life Decisions reviewed with patient: Yes    Provider agrees with end of life decisions: Yes      Cognitive Screening:   Provider or family/friend/caregiver concerned regarding cognition?: No    PREVENTIVE SCREENINGS      Cardiovascular Screening:    General: Screening Not Indicated and History Lipid Disorder      Diabetes Screening:     General: Screening Current      Colorectal Cancer Screening:     General: Screening Not Indicated      Prostate Cancer Screening:    General: Screening Not Indicated Abdominal Aortic Aneurysm (AAA) Screening:    Risk factors include: tobacco use        Lung Cancer Screening:     General: Screening Not Indicated    Screening, Brief Intervention, and Referral to Treatment (SBIRT)    Screening    Typical number of drinks in a week: 2    AUDIT-C Screenin) How often did you have a drink containing alcohol in the past year? 2 to 4 times a month  2) How many drinks did you have on a typical day when you were drinking in the past year? 1 to 2  3) How often did you have 6 or more drinks on one occasion in the past year? never    AUDIT-C Score: 2  Interpretation: Score 0-3 (male): Negative screen for alcohol misuse    Single Item Drug Screening:  How often have you used an illegal drug (including marijuana) or a prescription medication for non-medical reasons in the past year? never    Single Item Drug Screen Score: 0  Interpretation: Negative screen for possible drug use disorder    No results found  Physical Exam:     /64 (BP Location: Left arm, Patient Position: Sitting, Cuff Size: Standard)   Pulse 70   Temp 98 °F (36 7 °C) (Tympanic)   Resp 16   Ht 5' 9" (1 753 m)   Wt 86 2 kg (190 lb)   SpO2 92%   BMI 28 06 kg/m²     Physical Exam  Vitals and nursing note reviewed  Constitutional:       General: He is not in acute distress  Appearance: Normal appearance  He is well-developed and normal weight  He is not ill-appearing, toxic-appearing or diaphoretic  HENT:      Head: Normocephalic and atraumatic  Right Ear: Tympanic membrane normal  There is no impacted cerumen  Left Ear: Tympanic membrane normal  There is no impacted cerumen  Nose: Nose normal  No congestion or rhinorrhea  Mouth/Throat:      Mouth: Mucous membranes are moist       Pharynx: Oropharynx is clear  Eyes:      General: No scleral icterus  Right eye: No discharge  Left eye: No discharge  Extraocular Movements: Extraocular movements intact  Conjunctiva/sclera: Conjunctivae normal       Pupils: Pupils are equal, round, and reactive to light  Cardiovascular:      Rate and Rhythm: Normal rate and regular rhythm  Pulses: Normal pulses  Heart sounds: Normal heart sounds  No murmur heard  Pulmonary:      Effort: Pulmonary effort is normal  No respiratory distress  Breath sounds: Normal breath sounds  No wheezing  Abdominal:      General: Abdomen is flat  Bowel sounds are normal  There is no distension  Palpations: Abdomen is soft  Tenderness: There is no abdominal tenderness  There is no right CVA tenderness or left CVA tenderness  Musculoskeletal:         General: Normal range of motion  Cervical back: Normal range of motion and neck supple  Right lower leg: No edema  Left lower leg: No edema  Skin:     General: Skin is warm and dry  Capillary Refill: Capillary refill takes 2 to 3 seconds  Coloration: Skin is not jaundiced  Neurological:      General: No focal deficit present  Mental Status: He is alert and oriented to person, place, and time  Mental status is at baseline  Motor: No weakness     Psychiatric:         Mood and Affect: Mood normal          Behavior: Behavior normal           Amy Barrett MD

## 2023-02-10 DIAGNOSIS — I10 ESSENTIAL (PRIMARY) HYPERTENSION: ICD-10-CM

## 2023-02-10 RX ORDER — LISINOPRIL 20 MG/1
TABLET ORAL
Qty: 90 TABLET | Refills: 0 | Status: SHIPPED | OUTPATIENT
Start: 2023-02-10

## 2023-02-13 ENCOUNTER — OFFICE VISIT (OUTPATIENT)
Dept: FAMILY MEDICINE CLINIC | Facility: CLINIC | Age: 88
End: 2023-02-13

## 2023-02-13 VITALS
OXYGEN SATURATION: 99 % | WEIGHT: 189.6 LBS | DIASTOLIC BLOOD PRESSURE: 66 MMHG | RESPIRATION RATE: 18 BRPM | HEART RATE: 71 BPM | HEIGHT: 69 IN | TEMPERATURE: 97.6 F | SYSTOLIC BLOOD PRESSURE: 124 MMHG | BODY MASS INDEX: 28.08 KG/M2

## 2023-02-13 DIAGNOSIS — H92.03 EARACHE SYMPTOMS, BILATERAL: Primary | ICD-10-CM

## 2023-02-13 DIAGNOSIS — H90.0 CONDUCTIVE HEARING LOSS, BILATERAL: ICD-10-CM

## 2023-02-13 DIAGNOSIS — H61.23 HEARING LOSS DUE TO CERUMEN IMPACTION, BILATERAL: ICD-10-CM

## 2023-02-13 DIAGNOSIS — H61.23 BILATERAL IMPACTED CERUMEN: ICD-10-CM

## 2023-02-13 NOTE — PROGRESS NOTES
Assessment/Plan:         Problem List Items Addressed This Visit    None  Visit Diagnoses     Earache symptoms, bilateral    -  Primary    Conductive hearing loss, bilateral        Hearing loss due to cerumen impaction, bilateral        Bilateral impacted cerumen        Relevant Orders    Ear cerumen removal (Completed)            Subjective:      Patient ID: Sindi Allen is a 80 y o  male  Patient here as having some discomfort in the both the ears and reduced hearing both ears no fever no chills no upper respiratory symptoms no other complaints      The following portions of the patient's history were reviewed and updated as appropriate:   Past Medical History:  He has a past medical history of BPH with obstruction/lower urinary tract symptoms, Cataract, Diverticulitis, Diverticulosis, Dizziness, Ear problems, Elevated PSA, Hypercholesterolemia, Hypertension, Osteoarthritis, Poor urinary stream, and Vertigo  ,  _______________________________________________________________________  Medical Problems:  does not have any pertinent problems on file ,  _______________________________________________________________________  Past Surgical History:   has a past surgical history that includes Prostate biopsy (2005); Cataract extraction (Left); Knee surgery (Left, 2005); Rotator cuff repair (Right, 06/15/2015); and Colonoscopy (02/16/2004)  ,  _______________________________________________________________________  Family History:  family history includes Cancer in his father; Diabetes in his father and mother; Heart attack in his mother; Heart disease in his mother ,  _______________________________________________________________________  Social History:   reports that he has quit smoking  He has been exposed to tobacco smoke  He has never used smokeless tobacco  He reports current alcohol use of about 14 0 standard drinks per week   He reports that he does not use drugs ,  _______________________________________________________________________  Allergies:  has No Known Allergies     _______________________________________________________________________  Current Outpatient Medications   Medication Sig Dispense Refill   • aspirin 81 MG tablet Daily     • atorvastatin (LIPITOR) 40 mg tablet TAKE 1 TABLET BY MOUTH EVERYDAY AT BEDTIME 90 tablet 0   • Cholecalciferol (VITAMIN D3) 2000 units capsule Daily     • cycloSPORINE (Restasis) 0 05 % ophthalmic emulsion Restasis 0 05 % eye drops in a dropperette   PUT 1 DROP INTO BOTH EYES TWICE A DAY     • lisinopril (ZESTRIL) 20 mg tablet TAKE 1 TABLET BY MOUTH EVERY DAY 90 tablet 0   • meclizine (ANTIVERT) 25 mg tablet Take 1 tablet (25 mg total) by mouth 3 (three) times a day 90 tablet 0     No current facility-administered medications for this visit      _______________________________________________________________________  Review of Systems   Constitutional: Negative for chills, fatigue and fever  HENT: Positive for ear pain (Discomfort both ears) and hearing loss (decreased both ears)  Negative for congestion, rhinorrhea, sneezing and sore throat  Eyes: Positive for visual disturbance  Negative for redness and itching  Respiratory: Negative for cough, chest tightness and shortness of breath  Cardiovascular: Negative for chest pain, palpitations and leg swelling  Gastrointestinal: Negative for abdominal pain, blood in stool, diarrhea, nausea and vomiting  Endocrine: Negative for cold intolerance and heat intolerance  Genitourinary: Negative for dysuria, frequency and urgency  Musculoskeletal: Negative for arthralgias, back pain and myalgias  Skin: Negative for color change and rash  Neurological: Positive for dizziness  Negative for weakness, light-headedness, numbness and headaches  Hematological: Does not bruise/bleed easily     Psychiatric/Behavioral: Negative for agitation, behavioral problems and confusion  Objective:  Vitals:    02/13/23 1054   BP: 124/66   BP Location: Right arm   Patient Position: Sitting   Cuff Size: Standard   Pulse: 71   Resp: 18   Temp: 97 6 °F (36 4 °C)   TempSrc: Tympanic   SpO2: 99%   Weight: 86 kg (189 lb 9 6 oz)   Height: 5' 9" (1 753 m)     Body mass index is 28 kg/m²  Physical Exam  Vitals and nursing note reviewed  Constitutional:       General: He is not in acute distress  Appearance: Normal appearance  He is not ill-appearing, toxic-appearing or diaphoretic  HENT:      Head: Normocephalic and atraumatic  Right Ear: Tympanic membrane, ear canal and external ear normal  There is impacted cerumen  Left Ear: Tympanic membrane, ear canal and external ear normal  There is impacted cerumen  Nose: Nose normal  No congestion  Mouth/Throat:      Mouth: Mucous membranes are moist    Eyes:      General: No scleral icterus  Right eye: No discharge  Left eye: No discharge  Extraocular Movements: Extraocular movements intact  Conjunctiva/sclera: Conjunctivae normal       Pupils: Pupils are equal, round, and reactive to light  Cardiovascular:      Rate and Rhythm: Normal rate and regular rhythm  Pulses: Normal pulses  Heart sounds: Normal heart sounds  No murmur heard  No gallop  Pulmonary:      Effort: Pulmonary effort is normal  No respiratory distress  Breath sounds: Normal breath sounds  No wheezing, rhonchi or rales  Abdominal:      General: Abdomen is flat  Bowel sounds are normal  There is no distension  Palpations: Abdomen is soft  Tenderness: There is no abdominal tenderness  There is no guarding  Musculoskeletal:         General: No swelling or tenderness  Normal range of motion  Cervical back: Normal range of motion and neck supple  No rigidity  Lymphadenopathy:      Cervical: No cervical adenopathy  Skin:     General: Skin is warm        Capillary Refill: Capillary refill takes 2 to 3 seconds  Coloration: Skin is not jaundiced  Findings: No bruising or rash  Neurological:      General: No focal deficit present  Mental Status: He is alert and oriented to person, place, and time  Mental status is at baseline  Gait: Gait normal    Psychiatric:         Mood and Affect: Mood normal        Ear cerumen removal    Date/Time: 2/13/2023 11:13 AM  Performed by: Stacia Sanchez MD  Authorized by: Stacia Sanchez MD   Universal Protocol:  Risks and benefits: risks, benefits and alternatives were discussed    Procedure details:     Location:  L ear and R ear    Procedure type: irrigation with instrumentation      Approach:  External  Post-procedure details:     Complication:  None    Hearing quality:  Improved    Patient tolerance of procedure:   Tolerated well, no immediate complications

## 2023-04-04 DIAGNOSIS — I65.29 CAROTID STENOSIS: ICD-10-CM

## 2023-04-04 RX ORDER — ATORVASTATIN CALCIUM 40 MG/1
TABLET, FILM COATED ORAL
Qty: 90 TABLET | Refills: 0 | Status: SHIPPED | OUTPATIENT
Start: 2023-04-04

## 2023-04-24 ENCOUNTER — RA CDI HCC (OUTPATIENT)
Dept: OTHER | Facility: HOSPITAL | Age: 88
End: 2023-04-24

## 2023-04-24 NOTE — PROGRESS NOTES
[address]  coding opportunities       Chart reviewed, no opportunity found: CHART REVIEWED, NO OPPORTUNITY FOUND        Patients Insurance     Medicare Insurance: Medicare

## 2023-05-01 ENCOUNTER — OFFICE VISIT (OUTPATIENT)
Dept: FAMILY MEDICINE CLINIC | Facility: CLINIC | Age: 88
End: 2023-05-01

## 2023-05-01 VITALS
WEIGHT: 192 LBS | OXYGEN SATURATION: 96 % | TEMPERATURE: 97.4 F | RESPIRATION RATE: 18 BRPM | HEART RATE: 73 BPM | SYSTOLIC BLOOD PRESSURE: 104 MMHG | DIASTOLIC BLOOD PRESSURE: 60 MMHG | HEIGHT: 69 IN | BODY MASS INDEX: 28.44 KG/M2

## 2023-05-01 DIAGNOSIS — I10 ESSENTIAL (PRIMARY) HYPERTENSION: ICD-10-CM

## 2023-05-01 DIAGNOSIS — N18.31 STAGE 3A CHRONIC KIDNEY DISEASE (CKD) (HCC): ICD-10-CM

## 2023-05-01 DIAGNOSIS — E78.2 MIXED HYPERLIPIDEMIA: ICD-10-CM

## 2023-05-01 DIAGNOSIS — I10 ESSENTIAL HYPERTENSION: Primary | ICD-10-CM

## 2023-05-01 DIAGNOSIS — I73.9 PAD (PERIPHERAL ARTERY DISEASE) (HCC): ICD-10-CM

## 2023-05-01 RX ORDER — LISINOPRIL 10 MG/1
10 TABLET ORAL DAILY
Qty: 90 TABLET | Refills: 2 | Status: SHIPPED | OUTPATIENT
Start: 2023-05-01

## 2023-05-01 NOTE — PROGRESS NOTES
Assessment/Plan:         Problem List Items Addressed This Visit        Cardiovascular and Mediastinum    Essential hypertension - Primary     Under control  Blood pressure on the low side and because of the GFR reducing we will reduce his lisinopril to 10 mg daily  We will re-evaluate at next office visit  Relevant Medications    lisinopril (ZESTRIL) 10 mg tablet    Other Relevant Orders    Basic metabolic panel    PAD (peripheral artery disease) ([facility] [address] )     Under control  Continue current medication  We will re-evaluate at next office visit  Genitourinary    Stage 3a chronic kidney disease (CKD) ([facility] )     Lab Results   Component Value Date    EGFR 45 01/13/2023    EGFR 55 09/02/2021    EGFR 51 09/02/2021    CREATININE 1 39 (H) 01/13/2023    CREATININE 1 19 09/02/2021    CREATININE 1 27 09/02/2021   creatinine and GFR stable   Will need periodic BMP  Avoid NSAIDs like ibuprofen Aleve Advil etc   Avoid high potassium diet  We will continue to monitor              Relevant Orders    Basic metabolic panel       Other    Mixed hyperlipidemia     Under control  Continue current medication  We will re-evaluate at next office visit  Other Visit Diagnoses     Essential (primary) hypertension        Relevant Medications    lisinopril (ZESTRIL) 10 mg tablet            Subjective:      Patient ID: Ivone Katz is a 80 y o  male  HPI    The following portions of the patient's history were reviewed and updated as appropriate:   Past Medical History:  He has a past medical history of BPH with obstruction/lower urinary tract symptoms, Cataract, Diverticulitis, Diverticulosis, Dizziness, Ear problems, Elevated PSA, Hypercholesterolemia, Hypertension, Osteoarthritis, Poor urinary stream, and Vertigo  ,  _______________________________________________________________________  Medical Problems:  does not have any pertinent problems on file ,  _______________________________________________________________________  Past Surgical History:   has a past surgical history that includes Prostate biopsy (2005); Cataract extraction (Left); Knee surgery (Left, 2005); Rotator cuff repair (Right, 06/15/2015); and Colonoscopy (02/16/2004)  ,  _______________________________________________________________________  Family History:  family history includes Cancer in his father; Diabetes in his father and mother; Heart attack in his mother; Heart disease in his mother ,  _______________________________________________________________________  Social History:   reports that he has quit smoking  He has been exposed to tobacco smoke  He has never used smokeless tobacco  He reports current alcohol use of about 14 0 standard drinks per week  He reports that he does not use drugs  ,  _______________________________________________________________________  Allergies:  has No Known Allergies     _______________________________________________________________________  Current Outpatient Medications   Medication Sig Dispense Refill    aspirin 81 MG tablet Daily      atorvastatin (LIPITOR) 40 mg tablet TAKE 1 TABLET BY MOUTH EVERYDAY AT BEDTIME 90 tablet 0    Cholecalciferol (VITAMIN D3) 2000 units capsule Daily      cycloSPORINE (Restasis) 0 05 % ophthalmic emulsion Restasis 0 05 % eye drops in a dropperette   PUT 1 DROP INTO BOTH EYES TWICE A DAY      lisinopril (ZESTRIL) 10 mg tablet Take 1 tablet (10 mg total) by mouth daily 90 tablet 2    meclizine (ANTIVERT) 25 mg tablet Take 1 tablet (25 mg total) by mouth 3 (three) times a day 90 tablet 0     No current facility-administered medications for this visit      _______________________________________________________________________  Review of Systems   Constitutional: Negative for chills, fatigue and fever  HENT: Positive for hearing loss (decreased both ears)   Negative for congestion, ear pain (Discomfort both "ears), rhinorrhea, sneezing and sore throat  Eyes: Positive for visual disturbance  Negative for redness and itching  Respiratory: Negative for cough, chest tightness and shortness of breath  Cardiovascular: Negative for chest pain, palpitations and leg swelling  Gastrointestinal: Negative for abdominal pain, blood in stool, diarrhea, nausea and vomiting  Endocrine: Negative for cold intolerance and heat intolerance  Genitourinary: Negative for dysuria, frequency and urgency  Musculoskeletal: Negative for arthralgias, back pain and myalgias  Skin: Negative for color change and rash  Neurological: Positive for dizziness  Negative for weakness, light-headedness, numbness and headaches  Hematological: Does not bruise/bleed easily  Psychiatric/Behavioral: Negative for agitation, behavioral problems and confusion  Objective:  Vitals:    05/01/23 1126   BP: 104/60   BP Location: Left arm   Patient Position: Sitting   Cuff Size: Standard   Pulse: 73   Resp: 18   Temp: (!) 97 4 °F (36 3 °C)   TempSrc: Tympanic   SpO2: 96%   Weight: 87 1 kg (192 lb)   Height: 5' 9\" (1 753 m)     Body mass index is 28 35 kg/m²  Physical Exam  Vitals and nursing note reviewed  Constitutional:       General: He is not in acute distress  Appearance: Normal appearance  He is not ill-appearing, toxic-appearing or diaphoretic  HENT:      Head: Normocephalic and atraumatic  Right Ear: Tympanic membrane normal       Left Ear: Tympanic membrane normal       Nose: Nose normal  No congestion  Mouth/Throat:      Mouth: Mucous membranes are moist    Eyes:      General: No scleral icterus  Right eye: No discharge  Left eye: No discharge  Extraocular Movements: Extraocular movements intact  Conjunctiva/sclera: Conjunctivae normal       Pupils: Pupils are equal, round, and reactive to light  Cardiovascular:      Rate and Rhythm: Normal rate and regular rhythm        Pulses: " Normal pulses  Heart sounds: Normal heart sounds  No murmur heard  No gallop  Pulmonary:      Effort: Pulmonary effort is normal  No respiratory distress  Breath sounds: Normal breath sounds  No wheezing, rhonchi or rales  Abdominal:      General: Abdomen is flat  Bowel sounds are normal  There is no distension  Palpations: Abdomen is soft  Tenderness: There is no abdominal tenderness  There is no guarding  Musculoskeletal:         General: No swelling or tenderness  Normal range of motion  Cervical back: Normal range of motion and neck supple  No rigidity  Right lower leg: No edema  Left lower leg: No edema  Lymphadenopathy:      Cervical: No cervical adenopathy  Skin:     General: Skin is warm  Capillary Refill: Capillary refill takes 2 to 3 seconds  Coloration: Skin is not jaundiced  Findings: No bruising or rash  Neurological:      General: No focal deficit present  Mental Status: He is alert and oriented to person, place, and time  Mental status is at baseline        Gait: Gait normal    Psychiatric:         Mood and Affect: Mood normal          Behavior: Behavior normal

## 2023-05-01 NOTE — ASSESSMENT & PLAN NOTE
Under control  Blood pressure on the low side and because of the GFR reducing we will reduce his lisinopril to 10 mg daily  We will re-evaluate at next office visit

## 2023-05-01 NOTE — ASSESSMENT & PLAN NOTE
Lab Results   Component Value Date    EGFR 45 01/13/2023    EGFR 55 09/02/2021    EGFR 51 09/02/2021    CREATININE 1 39 (H) 01/13/2023    CREATININE 1 19 09/02/2021    CREATININE 1 27 09/02/2021   creatinine and GFR stable   Will need periodic BMP  Avoid NSAIDs like ibuprofen Aleve Advil etc   Avoid high potassium diet    We will continue to monitor

## 2023-07-03 DIAGNOSIS — I65.29 CAROTID STENOSIS: ICD-10-CM

## 2023-07-03 RX ORDER — ATORVASTATIN CALCIUM 40 MG/1
TABLET, FILM COATED ORAL
Qty: 90 TABLET | Refills: 0 | Status: SHIPPED | OUTPATIENT
Start: 2023-07-03

## 2023-07-14 ENCOUNTER — APPOINTMENT (OUTPATIENT)
Dept: LAB | Facility: CLINIC | Age: 88
End: 2023-07-14
Payer: COMMERCIAL

## 2023-07-14 DIAGNOSIS — N18.31 STAGE 3A CHRONIC KIDNEY DISEASE (CKD) (HCC): ICD-10-CM

## 2023-07-14 DIAGNOSIS — I10 ESSENTIAL HYPERTENSION: ICD-10-CM

## 2023-07-14 LAB
ANION GAP SERPL CALCULATED.3IONS-SCNC: 4 MMOL/L
BUN SERPL-MCNC: 17 MG/DL (ref 5–25)
CALCIUM SERPL-MCNC: 9.3 MG/DL (ref 8.3–10.1)
CHLORIDE SERPL-SCNC: 112 MMOL/L (ref 96–108)
CO2 SERPL-SCNC: 24 MMOL/L (ref 21–32)
CREAT SERPL-MCNC: 1.44 MG/DL (ref 0.6–1.3)
GFR SERPL CREATININE-BSD FRML MDRD: 43 ML/MIN/1.73SQ M
GLUCOSE P FAST SERPL-MCNC: 118 MG/DL (ref 65–99)
POTASSIUM SERPL-SCNC: 4.6 MMOL/L (ref 3.5–5.3)
SODIUM SERPL-SCNC: 140 MMOL/L (ref 135–147)

## 2023-07-14 PROCEDURE — 36415 COLL VENOUS BLD VENIPUNCTURE: CPT

## 2023-07-14 PROCEDURE — 80048 BASIC METABOLIC PNL TOTAL CA: CPT

## 2023-08-01 ENCOUNTER — OFFICE VISIT (OUTPATIENT)
Dept: FAMILY MEDICINE CLINIC | Facility: CLINIC | Age: 88
End: 2023-08-01
Payer: COMMERCIAL

## 2023-08-01 VITALS
TEMPERATURE: 97.4 F | BODY MASS INDEX: 27.28 KG/M2 | HEIGHT: 69 IN | SYSTOLIC BLOOD PRESSURE: 130 MMHG | OXYGEN SATURATION: 98 % | DIASTOLIC BLOOD PRESSURE: 80 MMHG | HEART RATE: 71 BPM | RESPIRATION RATE: 18 BRPM | WEIGHT: 184.2 LBS

## 2023-08-01 DIAGNOSIS — N18.31 STAGE 3A CHRONIC KIDNEY DISEASE (CKD) (HCC): ICD-10-CM

## 2023-08-01 DIAGNOSIS — I10 ESSENTIAL HYPERTENSION: Primary | ICD-10-CM

## 2023-08-01 DIAGNOSIS — E78.2 MIXED HYPERLIPIDEMIA: ICD-10-CM

## 2023-08-01 DIAGNOSIS — I73.9 PAD (PERIPHERAL ARTERY DISEASE) (HCC): ICD-10-CM

## 2023-08-01 DIAGNOSIS — H81.4 VERTIGO OF CENTRAL ORIGIN: ICD-10-CM

## 2023-08-01 PROCEDURE — 99214 OFFICE O/P EST MOD 30 MIN: CPT

## 2023-08-01 RX ORDER — MECLIZINE HYDROCHLORIDE 25 MG/1
25 TABLET ORAL 3 TIMES DAILY
Qty: 90 TABLET | Refills: 0 | Status: SHIPPED | OUTPATIENT
Start: 2023-08-01

## 2023-08-01 NOTE — PROGRESS NOTES
Assessment/Plan:         Problem List Items Addressed This Visit        Cardiovascular and Mediastinum    Essential hypertension - Primary     Under control. Continue current medication. We will re-evaluate at next office visit. PAD (peripheral artery disease) (HCC)     Under control. Continue current medication. We will re-evaluate at next office visit. Nervous and Auditory    Vertigo of central origin     Under control. Continue current medication. We will re-evaluate at next office visit. Genitourinary    Stage 3a chronic kidney disease (CKD) ([address])     Lab Results   Component Value Date    EGFR 43 07/14/2023    EGFR 45 01/13/2023    EGFR 55 09/02/2021    CREATININE 1.44 (H) 07/14/2023    CREATININE 1.39 (H) 01/13/2023    CREATININE 1.19 09/02/2021   creatinine and GFR stable   Will need periodic BMP  Avoid NSAIDs like ibuprofen Aleve Advil etc.  Avoid high potassium diet. We will continue to monitor                 Other    Mixed hyperlipidemia     Under control. Continue current medication. We will re-evaluate at next office visit. Subjective:      Patient ID: Vero Sweet is a 80 y.o. male. Patient here for review of chronic medical problems and  the labs and imaging if it is applicable. Currently has no specific complaints other than mentioned in the review of systems  Denies chest pain, SOB, cough, abdominal pain, nausea, vomiting, fever, chills, lightheadedness, ,headache, tingling or numbness. No bowel or bladder problem.         The following portions of the patient's history were reviewed and updated as appropriate:   Past Medical History:  He has a past medical history of BPH with obstruction/lower urinary tract symptoms, Cataract, Diverticulitis, Diverticulosis, Dizziness, Ear problems, Elevated PSA, Hypercholesterolemia, Hypertension, Osteoarthritis, Poor urinary stream, and Vertigo. ,  _______________________________________________________________________  Medical Problems:  does not have any pertinent problems on file.,  _______________________________________________________________________  Past Surgical History:   has a past surgical history that includes Prostate biopsy (2005); Cataract extraction (Left); Knee surgery (Left, 2005); Rotator cuff repair (Right, 06/15/2015); and Colonoscopy (02/16/2004). ,  _______________________________________________________________________  Family History:  family history includes Cancer in his father; Diabetes in his father and mother; Heart attack in his mother; Heart disease in his mother.,  _______________________________________________________________________  Social History:   reports that he has quit smoking. He has been exposed to tobacco smoke. He has never used smokeless tobacco. He reports current alcohol use of about 14.0 standard drinks of alcohol per week. He reports that he does not use drugs. ,  _______________________________________________________________________  Allergies:  has No Known Allergies. .  _______________________________________________________________________  Current Outpatient Medications   Medication Sig Dispense Refill   • aspirin 81 MG tablet Daily     • atorvastatin (LIPITOR) 40 mg tablet TAKE 1 TABLET BY MOUTH EVERYDAY AT BEDTIME 90 tablet 0   • Cholecalciferol (VITAMIN D3) 2000 units capsule Daily     • cycloSPORINE (Restasis) 0.05 % ophthalmic emulsion Restasis 0.05 % eye drops in a dropperette   PUT 1 DROP INTO BOTH EYES TWICE A DAY     • lisinopril (ZESTRIL) 10 mg tablet Take 1 tablet (10 mg total) by mouth daily 90 tablet 2   • meclizine (ANTIVERT) 25 mg tablet Take 1 tablet (25 mg total) by mouth 3 (three) times a day 90 tablet 0     No current facility-administered medications for this visit.     _______________________________________________________________________  Review of Systems Constitutional: Negative for chills, fatigue and fever. HENT: Positive for hearing loss (decreased both ears). Negative for congestion, ear pain (Discomfort both ears), rhinorrhea, sneezing and sore throat. Eyes: Positive for visual disturbance. Negative for redness and itching. Respiratory: Negative for cough, chest tightness and shortness of breath. Cardiovascular: Negative for chest pain, palpitations and leg swelling. Gastrointestinal: Negative for abdominal pain, blood in stool, diarrhea, nausea and vomiting. Endocrine: Negative for cold intolerance and heat intolerance. Genitourinary: Negative for dysuria, frequency and urgency. Musculoskeletal: Negative for arthralgias, back pain and myalgias. Skin: Negative for color change and rash. Neurological: Positive for dizziness. Negative for weakness, light-headedness, numbness and headaches. Hematological: Does not bruise/bleed easily. Psychiatric/Behavioral: Negative for agitation, behavioral problems and confusion. Objective: There were no vitals filed for this visit. There is no height or weight on file to calculate BMI. Physical Exam  Vitals and nursing note reviewed. Constitutional:       General: He is not in acute distress. Appearance: Normal appearance. He is not ill-appearing, toxic-appearing or diaphoretic. HENT:      Head: Normocephalic and atraumatic. Right Ear: Tympanic membrane normal.      Left Ear: Tympanic membrane normal.      Nose: Nose normal. No congestion. Mouth/Throat:      Mouth: Mucous membranes are moist.   Eyes:      General: No scleral icterus. Right eye: No discharge. Left eye: No discharge. Extraocular Movements: Extraocular movements intact. Conjunctiva/sclera: Conjunctivae normal.      Pupils: Pupils are equal, round, and reactive to light. Cardiovascular:      Rate and Rhythm: Normal rate and regular rhythm. Pulses: Normal pulses.       Heart sounds: Normal heart sounds. No murmur heard. No gallop. Pulmonary:      Effort: Pulmonary effort is normal. No respiratory distress. Breath sounds: Normal breath sounds. No wheezing, rhonchi or rales. Abdominal:      General: Abdomen is flat. Bowel sounds are normal. There is no distension. Palpations: Abdomen is soft. Tenderness: There is no abdominal tenderness. There is no guarding. Musculoskeletal:         General: No swelling or tenderness. Normal range of motion. Cervical back: Normal range of motion and neck supple. No rigidity. Right lower leg: No edema. Left lower leg: No edema. Lymphadenopathy:      Cervical: No cervical adenopathy. Skin:     General: Skin is warm. Capillary Refill: Capillary refill takes 2 to 3 seconds. Coloration: Skin is not jaundiced. Findings: No bruising or rash. Neurological:      General: No focal deficit present. Mental Status: He is alert and oriented to person, place, and time. Mental status is at baseline.       Gait: Gait normal.   Psychiatric:         Mood and Affect: Mood normal.         Behavior: Behavior normal.

## 2023-08-01 NOTE — ASSESSMENT & PLAN NOTE
Lab Results   Component Value Date    EGFR 43 07/14/2023    EGFR 45 01/13/2023    EGFR 55 09/02/2021    CREATININE 1.44 (H) 07/14/2023    CREATININE 1.39 (H) 01/13/2023    CREATININE 1.19 09/02/2021   creatinine and GFR stable   Will need periodic BMP  Avoid NSAIDs like ibuprofen Aleve Advil etc.  Avoid high potassium diet.   We will continue to monitor

## 2023-10-05 DIAGNOSIS — I65.29 CAROTID STENOSIS: ICD-10-CM

## 2023-10-05 RX ORDER — ATORVASTATIN CALCIUM 40 MG/1
TABLET, FILM COATED ORAL
Qty: 90 TABLET | Refills: 0 | Status: SHIPPED | OUTPATIENT
Start: 2023-10-05

## 2023-11-06 ENCOUNTER — OFFICE VISIT (OUTPATIENT)
Dept: FAMILY MEDICINE CLINIC | Facility: CLINIC | Age: 88
End: 2023-11-06
Payer: COMMERCIAL

## 2023-11-06 VITALS
BODY MASS INDEX: 27.64 KG/M2 | DIASTOLIC BLOOD PRESSURE: 80 MMHG | WEIGHT: 186.6 LBS | TEMPERATURE: 97.6 F | SYSTOLIC BLOOD PRESSURE: 130 MMHG | HEIGHT: 69 IN | OXYGEN SATURATION: 99 % | RESPIRATION RATE: 18 BRPM | HEART RATE: 60 BPM

## 2023-11-06 DIAGNOSIS — I10 ESSENTIAL HYPERTENSION: ICD-10-CM

## 2023-11-06 DIAGNOSIS — Z23 ENCOUNTER FOR IMMUNIZATION: Primary | ICD-10-CM

## 2023-11-06 DIAGNOSIS — I73.9 PAD (PERIPHERAL ARTERY DISEASE) (HCC): ICD-10-CM

## 2023-11-06 DIAGNOSIS — E55.9 VITAMIN D DEFICIENCY: ICD-10-CM

## 2023-11-06 DIAGNOSIS — E78.2 MIXED HYPERLIPIDEMIA: ICD-10-CM

## 2023-11-06 DIAGNOSIS — N18.31 STAGE 3A CHRONIC KIDNEY DISEASE (CKD) (HCC): ICD-10-CM

## 2023-11-06 PROCEDURE — 90662 IIV NO PRSV INCREASED AG IM: CPT

## 2023-11-06 PROCEDURE — 99213 OFFICE O/P EST LOW 20 MIN: CPT

## 2023-11-06 PROCEDURE — G0008 ADMIN INFLUENZA VIRUS VAC: HCPCS

## 2023-11-06 NOTE — PROGRESS NOTES
Assessment/Plan:         Problem List Items Addressed This Visit        Cardiovascular and Mediastinum    Essential hypertension     Under control. Continue current medication. We will re-evaluate at next office visit. Relevant Orders    CBC and differential    Comprehensive metabolic panel    Vitamin D 25 hydroxy    PAD (peripheral artery disease) (HCC)       Genitourinary    Stage 3a chronic kidney disease (CKD) ([address])       Other    Mixed hyperlipidemia    Relevant Orders    Lipid Panel with Direct LDL reflex   Other Visit Diagnoses     Encounter for immunization    -  Primary    Relevant Orders    influenza vaccine, high-dose, PF 0.7 mL (FLUZONE HIGH-DOSE)    Vitamin D deficiency        Relevant Orders    Urinalysis with microscopic            Subjective:      Patient ID: Haseeb Joiner is a 80 y.o. male. Patient here for review of chronic medical problems and  the labs and imaging if it is applicable. Currently has no specific complaints other than mentioned in the review of systems  Denies chest pain, SOB, cough, abdominal pain, nausea, vomiting, fever, chills, lightheadedness, dizziness,headache, tingling or numbness. No bowel or bladder problem. The following portions of the patient's history were reviewed and updated as appropriate:   Past Medical History:  He has a past medical history of BPH with obstruction/lower urinary tract symptoms, Cataract, Diverticulitis, Diverticulosis, Dizziness, Ear problems, Elevated PSA, Hypercholesterolemia, Hypertension, Osteoarthritis, Poor urinary stream, and Vertigo. ,  _______________________________________________________________________  Medical Problems:  does not have any pertinent problems on file.,  _______________________________________________________________________  Past Surgical History:   has a past surgical history that includes Prostate biopsy (2005); Cataract extraction (Left); Knee surgery (Left, 2005);  Rotator cuff repair (Right, 06/15/2015); and Colonoscopy (02/16/2004). ,  _______________________________________________________________________  Family History:  family history includes Cancer in his father; Diabetes in his father and mother; Heart attack in his mother; Heart disease in his mother.,  _______________________________________________________________________  Social History:   reports that he has quit smoking. His smoking use included cigarettes. He has been exposed to tobacco smoke. He has never used smokeless tobacco. He reports current alcohol use of about 14.0 standard drinks of alcohol per week. He reports that he does not use drugs. ,  _______________________________________________________________________  Allergies:  has No Known Allergies. .  _______________________________________________________________________  Current Outpatient Medications   Medication Sig Dispense Refill   • aspirin 81 MG tablet Daily     • atorvastatin (LIPITOR) 40 mg tablet TAKE 1 TABLET BY MOUTH EVERYDAY AT BEDTIME 90 tablet 0   • Cholecalciferol (VITAMIN D3) 2000 units capsule Daily     • cycloSPORINE (Restasis) 0.05 % ophthalmic emulsion Restasis 0.05 % eye drops in a dropperette   PUT 1 DROP INTO BOTH EYES TWICE A DAY     • lisinopril (ZESTRIL) 10 mg tablet Take 1 tablet (10 mg total) by mouth daily 90 tablet 2   • meclizine (ANTIVERT) 25 mg tablet Take 1 tablet (25 mg total) by mouth 3 (three) times a day 90 tablet 0     No current facility-administered medications for this visit.     _______________________________________________________________________  Review of Systems   Constitutional:  Negative for chills, fatigue and fever. HENT:  Positive for hearing loss (decreased both ears). Negative for congestion, ear pain (Discomfort both ears), rhinorrhea, sneezing and sore throat. Eyes:  Positive for visual disturbance. Negative for redness and itching. Respiratory:  Negative for cough, chest tightness and shortness of breath. Cardiovascular:  Negative for chest pain, palpitations and leg swelling. Gastrointestinal:  Negative for abdominal pain, blood in stool, diarrhea, nausea and vomiting. Endocrine: Negative for cold intolerance and heat intolerance. Genitourinary:  Negative for dysuria, frequency and urgency. Musculoskeletal:  Negative for arthralgias, back pain and myalgias. Skin:  Negative for color change and rash. Neurological:  Positive for dizziness. Negative for weakness, light-headedness, numbness and headaches. Hematological:  Does not bruise/bleed easily. Psychiatric/Behavioral:  Negative for agitation, behavioral problems and confusion. Objective:  Vitals:    11/06/23 1004   BP: 130/80   BP Location: Left arm   Patient Position: Sitting   Cuff Size: Standard   Pulse: 60   Resp: 18   Temp: 97.6 °F (36.4 °C)   TempSrc: Tympanic   SpO2: 99%   Weight: 84.6 kg (186 lb 9.6 oz)   Height: 5' 9" (1.753 m)     Body mass index is 27.56 kg/m². Physical Exam  Vitals and nursing note reviewed. Constitutional:       General: He is not in acute distress. Appearance: Normal appearance. He is not ill-appearing, toxic-appearing or diaphoretic. HENT:      Head: Normocephalic and atraumatic. Right Ear: Tympanic membrane normal.      Left Ear: Tympanic membrane normal.      Nose: Nose normal. No congestion. Mouth/Throat:      Mouth: Mucous membranes are moist.      Pharynx: No oropharyngeal exudate or posterior oropharyngeal erythema. Eyes:      General: No scleral icterus. Right eye: No discharge. Left eye: No discharge. Extraocular Movements: Extraocular movements intact. Conjunctiva/sclera: Conjunctivae normal.      Pupils: Pupils are equal, round, and reactive to light. Cardiovascular:      Rate and Rhythm: Normal rate and regular rhythm. Pulses: Normal pulses. Heart sounds: Normal heart sounds. No murmur heard. No gallop.    Pulmonary:      Effort: Pulmonary effort is normal. No respiratory distress. Breath sounds: Normal breath sounds. No wheezing, rhonchi or rales. Abdominal:      General: Abdomen is flat. Bowel sounds are normal. There is no distension. Palpations: Abdomen is soft. Tenderness: There is no abdominal tenderness. There is no right CVA tenderness, left CVA tenderness or guarding. Musculoskeletal:         General: No swelling or tenderness. Normal range of motion. Cervical back: Normal range of motion and neck supple. No rigidity. Right lower leg: No edema. Left lower leg: No edema. Lymphadenopathy:      Cervical: No cervical adenopathy. Skin:     General: Skin is warm. Capillary Refill: Capillary refill takes 2 to 3 seconds. Coloration: Skin is not jaundiced. Findings: No bruising or rash. Neurological:      General: No focal deficit present. Mental Status: He is alert and oriented to person, place, and time. Mental status is at baseline. Sensory: No sensory deficit. Motor: No weakness.       Gait: Gait normal.   Psychiatric:         Mood and Affect: Mood normal.         Behavior: Behavior normal.

## 2023-12-01 ENCOUNTER — TELEPHONE (OUTPATIENT)
Age: 88
End: 2023-12-01

## 2023-12-01 NOTE — TELEPHONE ENCOUNTER
Patient's wife calling in reference to handicap placard form.   She dropped it off this week and wanted to know if it was ready for .  Spoke with Maribel in Dr. Roy's office, form is not ready yet and office will call patient once Dr. Roy has completed the form.  Patient's wife is aware.    Please call Jessica when ready at [phone] or on their home number

## 2023-12-04 ENCOUNTER — TELEPHONE (OUTPATIENT)
Dept: FAMILY MEDICINE CLINIC | Facility: CLINIC | Age: 88
End: 2023-12-04

## 2024-01-03 DIAGNOSIS — I65.29 CAROTID STENOSIS: ICD-10-CM

## 2024-01-03 RX ORDER — ATORVASTATIN CALCIUM 40 MG/1
TABLET, FILM COATED ORAL
Qty: 90 TABLET | Refills: 0 | Status: SHIPPED | OUTPATIENT
Start: 2024-01-03

## 2024-01-23 ENCOUNTER — APPOINTMENT (OUTPATIENT)
Dept: LAB | Facility: CLINIC | Age: 89
End: 2024-01-23
Payer: COMMERCIAL

## 2024-01-23 DIAGNOSIS — E78.2 MIXED HYPERLIPIDEMIA: ICD-10-CM

## 2024-01-23 DIAGNOSIS — I10 ESSENTIAL HYPERTENSION: ICD-10-CM

## 2024-01-23 LAB
25(OH)D3 SERPL-MCNC: 52.6 NG/ML (ref 30–100)
ALBUMIN SERPL BCP-MCNC: 4 G/DL (ref 3.5–5)
ALP SERPL-CCNC: 69 U/L (ref 34–104)
ALT SERPL W P-5'-P-CCNC: 19 U/L (ref 7–52)
ANION GAP SERPL CALCULATED.3IONS-SCNC: 9 MMOL/L
AST SERPL W P-5'-P-CCNC: 25 U/L (ref 13–39)
BACTERIA UR QL AUTO: ABNORMAL /HPF
BASOPHILS # BLD AUTO: 0.06 THOUSANDS/ÂΜL (ref 0–0.1)
BASOPHILS NFR BLD AUTO: 1 % (ref 0–1)
BILIRUB SERPL-MCNC: 0.73 MG/DL (ref 0.2–1)
BILIRUB UR QL STRIP: NEGATIVE
BUN SERPL-MCNC: 14 MG/DL (ref 5–25)
CALCIUM SERPL-MCNC: 9.3 MG/DL (ref 8.4–10.2)
CHLORIDE SERPL-SCNC: 106 MMOL/L (ref 96–108)
CHOLEST SERPL-MCNC: 116 MG/DL
CLARITY UR: CLEAR
CO2 SERPL-SCNC: 25 MMOL/L (ref 21–32)
COLOR UR: ABNORMAL
CREAT SERPL-MCNC: 1.14 MG/DL (ref 0.6–1.3)
EOSINOPHIL # BLD AUTO: 0.15 THOUSAND/ÂΜL (ref 0–0.61)
EOSINOPHIL NFR BLD AUTO: 2 % (ref 0–6)
ERYTHROCYTE [DISTWIDTH] IN BLOOD BY AUTOMATED COUNT: 13.1 % (ref 11.6–15.1)
GFR SERPL CREATININE-BSD FRML MDRD: 57 ML/MIN/1.73SQ M
GLUCOSE P FAST SERPL-MCNC: 114 MG/DL (ref 65–99)
GLUCOSE UR STRIP-MCNC: NEGATIVE MG/DL
HCT VFR BLD AUTO: 46.5 % (ref 36.5–49.3)
HDLC SERPL-MCNC: 45 MG/DL
HGB BLD-MCNC: 15.3 G/DL (ref 12–17)
HGB UR QL STRIP.AUTO: NEGATIVE
HYALINE CASTS #/AREA URNS LPF: ABNORMAL /LPF
IMM GRANULOCYTES # BLD AUTO: 0.02 THOUSAND/UL (ref 0–0.2)
IMM GRANULOCYTES NFR BLD AUTO: 0 % (ref 0–2)
KETONES UR STRIP-MCNC: NEGATIVE MG/DL
LDLC SERPL CALC-MCNC: 47 MG/DL (ref 0–100)
LEUKOCYTE ESTERASE UR QL STRIP: NEGATIVE
LYMPHOCYTES # BLD AUTO: 2.27 THOUSANDS/ÂΜL (ref 0.6–4.47)
LYMPHOCYTES NFR BLD AUTO: 33 % (ref 14–44)
MCH RBC QN AUTO: 30.9 PG (ref 26.8–34.3)
MCHC RBC AUTO-ENTMCNC: 32.9 G/DL (ref 31.4–37.4)
MCV RBC AUTO: 94 FL (ref 82–98)
MONOCYTES # BLD AUTO: 0.58 THOUSAND/ÂΜL (ref 0.17–1.22)
MONOCYTES NFR BLD AUTO: 8 % (ref 4–12)
NEUTROPHILS # BLD AUTO: 3.8 THOUSANDS/ÂΜL (ref 1.85–7.62)
NEUTS SEG NFR BLD AUTO: 56 % (ref 43–75)
NITRITE UR QL STRIP: NEGATIVE
NON-SQ EPI CELLS URNS QL MICRO: ABNORMAL /HPF
NRBC BLD AUTO-RTO: 0 /100 WBCS
PH UR STRIP.AUTO: 6 [PH]
PLATELET # BLD AUTO: 220 THOUSANDS/UL (ref 149–390)
PMV BLD AUTO: 10 FL (ref 8.9–12.7)
POTASSIUM SERPL-SCNC: 4.9 MMOL/L (ref 3.5–5.3)
PROT SERPL-MCNC: 6.8 G/DL (ref 6.4–8.4)
PROT UR STRIP-MCNC: NEGATIVE MG/DL
RBC # BLD AUTO: 4.95 MILLION/UL (ref 3.88–5.62)
RBC #/AREA URNS AUTO: ABNORMAL /HPF
SODIUM SERPL-SCNC: 140 MMOL/L (ref 135–147)
SP GR UR STRIP.AUTO: 1.01 (ref 1–1.03)
TRIGL SERPL-MCNC: 122 MG/DL
UROBILINOGEN UR STRIP-ACNC: <2 MG/DL
WBC # BLD AUTO: 6.88 THOUSAND/UL (ref 4.31–10.16)
WBC #/AREA URNS AUTO: ABNORMAL /HPF

## 2024-01-23 PROCEDURE — 82306 VITAMIN D 25 HYDROXY: CPT

## 2024-01-23 PROCEDURE — 85025 COMPLETE CBC W/AUTO DIFF WBC: CPT

## 2024-01-23 PROCEDURE — 80053 COMPREHEN METABOLIC PANEL: CPT

## 2024-01-23 PROCEDURE — 36415 COLL VENOUS BLD VENIPUNCTURE: CPT

## 2024-01-23 PROCEDURE — 80061 LIPID PANEL: CPT

## 2024-01-24 DIAGNOSIS — I10 ESSENTIAL (PRIMARY) HYPERTENSION: ICD-10-CM

## 2024-01-24 RX ORDER — LISINOPRIL 10 MG/1
10 TABLET ORAL DAILY
Qty: 90 TABLET | Refills: 1 | Status: SHIPPED | OUTPATIENT
Start: 2024-01-24

## 2024-01-31 ENCOUNTER — OFFICE VISIT (OUTPATIENT)
Dept: FAMILY MEDICINE CLINIC | Facility: CLINIC | Age: 89
End: 2024-01-31
Payer: COMMERCIAL

## 2024-01-31 VITALS
BODY MASS INDEX: 27.7 KG/M2 | HEART RATE: 82 BPM | HEIGHT: 69 IN | DIASTOLIC BLOOD PRESSURE: 80 MMHG | SYSTOLIC BLOOD PRESSURE: 124 MMHG | TEMPERATURE: 97.9 F | RESPIRATION RATE: 18 BRPM | WEIGHT: 187 LBS | OXYGEN SATURATION: 99 %

## 2024-01-31 DIAGNOSIS — N18.31 STAGE 3A CHRONIC KIDNEY DISEASE (CKD) (HCC): ICD-10-CM

## 2024-01-31 DIAGNOSIS — I73.9 PAD (PERIPHERAL ARTERY DISEASE) (HCC): ICD-10-CM

## 2024-01-31 DIAGNOSIS — Z00.00 MEDICARE ANNUAL WELLNESS VISIT, SUBSEQUENT: ICD-10-CM

## 2024-01-31 DIAGNOSIS — I10 ESSENTIAL HYPERTENSION: Primary | ICD-10-CM

## 2024-01-31 DIAGNOSIS — E78.2 MIXED HYPERLIPIDEMIA: ICD-10-CM

## 2024-01-31 PROCEDURE — 1125F AMNT PAIN NOTED PAIN PRSNT: CPT

## 2024-01-31 PROCEDURE — 3288F FALL RISK ASSESSMENT DOCD: CPT

## 2024-01-31 PROCEDURE — G0439 PPPS, SUBSEQ VISIT: HCPCS

## 2024-01-31 PROCEDURE — 1159F MED LIST DOCD IN RCRD: CPT

## 2024-01-31 PROCEDURE — 99213 OFFICE O/P EST LOW 20 MIN: CPT

## 2024-01-31 PROCEDURE — 1170F FXNL STATUS ASSESSED: CPT

## 2024-01-31 PROCEDURE — 1160F RVW MEDS BY RX/DR IN RCRD: CPT

## 2024-01-31 NOTE — PATIENT INSTRUCTIONS
Medicare Preventive Visit Patient Instructions  Thank you for completing your Welcome to Medicare Visit or Medicare Annual Wellness Visit today. Your next wellness visit will be due in one year (1/31/2025).  The screening/preventive services that you may require over the next 5-10 years are detailed below. Some tests may not apply to you based off risk factors and/or age. Screening tests ordered at today's visit but not completed yet may show as past due. Also, please note that scanned in results may not display below.  Preventive Screenings:  Service Recommendations Previous Testing/Comments   Colorectal Cancer Screening  Colonoscopy    Fecal Occult Blood Test (FOBT)/Fecal Immunochemical Test (FIT)  Fecal DNA/Cologuard Test  Flexible Sigmoidoscopy Age: 45-75 years old   Colonoscopy: every 10 years (May be performed more frequently if at higher risk)  OR  FOBT/FIT: every 1 year  OR  Cologuard: every 3 years  OR  Sigmoidoscopy: every 5 years  Screening may be recommended earlier than age 45 if at higher risk for colorectal cancer. Also, an individualized decision between you and your healthcare provider will decide whether screening between the ages of 76-85 would be appropriate. Colonoscopy: Not on file  FOBT/FIT: Not on file  Cologuard: Not on file  Sigmoidoscopy: Not on file    Screening Not Indicated     Prostate Cancer Screening Individualized decision between patient and health care provider in men between ages of 55-69   Medicare will cover every 12 months beginning on the day after your 50th birthday PSA: 6.9 ng/mL     Screening Not Indicated     Hepatitis C Screening Once for adults born between 1945 and 1965  More frequently in patients at high risk for Hepatitis C Hep C Antibody: Not on file        Diabetes Screening 1-2 times per year if you're at risk for diabetes or have pre-diabetes Fasting glucose: 114 mg/dL (1/23/2024)  A1C: 5.9 % (9/2/2021)  Screening Current   Cholesterol Screening Once every 5  years if you don't have a lipid disorder. May order more often based on risk factors. Lipid panel: 01/23/2024  Screening Not Indicated  History Lipid Disorder      Other Preventive Screenings Covered by Medicare:  Abdominal Aortic Aneurysm (AAA) Screening: covered once if your at risk. You're considered to be at risk if you have a family history of AAA or a male between the age of 65-75 who smoking at least 100 cigarettes in your lifetime.  Lung Cancer Screening: covers low dose CT scan once per year if you meet all of the following conditions: (1) Age 55-77; (2) No signs or symptoms of lung cancer; (3) Current smoker or have quit smoking within the last 15 years; (4) You have a tobacco smoking history of at least 20 pack years (packs per day x number of years you smoked); (5) You get a written order from a healthcare provider.  Glaucoma Screening: covered annually if you're considered high risk: (1) You have diabetes OR (2) Family history of glaucoma OR (3)  aged 50 and older OR (4)  American aged 65 and older  Osteoporosis Screening: covered every 2 years if you meet one of the following conditions: (1) Have a vertebral abnormality; (2) On glucocorticoid therapy for more than 3 months; (3) Have primary hyperparathyroidism; (4) On osteoporosis medications and need to assess response to drug therapy.  HIV Screening: covered annually if you're between the age of 15-65. Also covered annually if you are younger than 15 and older than 65 with risk factors for HIV infection. For pregnant patients, it is covered up to 3 times per pregnancy.    Immunizations:  Immunization Recommendations   Influenza Vaccine Annual influenza vaccination during flu season is recommended for all persons aged >= 6 months who do not have contraindications   Pneumococcal Vaccine   * Pneumococcal conjugate vaccine = PCV13 (Prevnar 13), PCV15 (Vaxneuvance), PCV20 (Prevnar 20)  * Pneumococcal polysaccharide vaccine = PPSV23  (Pneumovax) Adults 19-65 yo with certain risk factors or if 65+ yo  If never received any pneumonia vaccine: recommend Prevnar 20 (PCV20)  Give PCV20 if previously received 1 dose of PCV13 or PPSV23   Hepatitis B Vaccine 3 dose series if at intermediate or high risk (ex: diabetes, end stage renal disease, liver disease)   Respiratory syncytial virus (RSV) Vaccine - COVERED BY MEDICARE PART D  * RSVPreF3 (Arexvy) CDC recommends that adults 60 years of age and older may receive a single dose of RSV vaccine using shared clinical decision-making (SCDM)   Tetanus (Td) Vaccine - COST NOT COVERED BY MEDICARE PART B Following completion of primary series, a booster dose should be given every 10 years to maintain immunity against tetanus. Td may also be given as tetanus wound prophylaxis.   Tdap Vaccine - COST NOT COVERED BY MEDICARE PART B Recommended at least once for all adults. For pregnant patients, recommended with each pregnancy.   Shingles Vaccine (Shingrix) - COST NOT COVERED BY MEDICARE PART B  2 shot series recommended in those 19 years and older who have or will have weakened immune systems or those 50 years and older     Health Maintenance Due:  There are no preventive care reminders to display for this patient.  Immunizations Due:      Topic Date Due   • COVID-19 Vaccine (5 - 2023-24 season) 09/01/2023     Advance Directives   What are advance directives?  Advance directives are legal documents that state your wishes and plans for medical care. These plans are made ahead of time in case you lose your ability to make decisions for yourself. Advance directives can apply to any medical decision, such as the treatments you want, and if you want to donate organs.   What are the types of advance directives?  There are many types of advance directives, and each state has rules about how to use them. You may choose a combination of any of the following:  Living will:  This is a written record of the treatment you want.  You can also choose which treatments you do not want, which to limit, and which to stop at a certain time. This includes surgery, medicine, IV fluid, and tube feedings.   Durable power of  for healthcare (DPAHC):  This is a written record that states who you want to make healthcare choices for you when you are unable to make them for yourself. This person, called a proxy, is usually a family member or a friend. You may choose more than 1 proxy.  Do not resuscitate (DNR) order:  A DNR order is used in case your heart stops beating or you stop breathing. It is a request not to have certain forms of treatment, such as CPR. A DNR order may be included in other types of advance directives.  Medical directive:  This covers the care that you want if you are in a coma, near death, or unable to make decisions for yourself. You can list the treatments you want for each condition. Treatment may include pain medicine, surgery, blood transfusions, dialysis, IV or tube feedings, and a ventilator (breathing machine).  Values history:  This document has questions about your views, beliefs, and how you feel and think about life. This information can help others choose the care that you would choose.  Why are advance directives important?  An advance directive helps you control your care. Although spoken wishes may be used, it is better to have your wishes written down. Spoken wishes can be misunderstood, or not followed. Treatments may be given even if you do not want them. An advance directive may make it easier for your family to make difficult choices about your care.   Weight Management   Why it is important to manage your weight:  Being overweight increases your risk of health conditions such as heart disease, high blood pressure, type 2 diabetes, and certain types of cancer. It can also increase your risk for osteoarthritis, sleep apnea, and other respiratory problems. Aim for a slow, steady weight loss. Even a small  amount of weight loss can lower your risk of health problems.  How to lose weight safely:  A safe and healthy way to lose weight is to eat fewer calories and get regular exercise. You can lose up about 1 pound a week by decreasing the number of calories you eat by 500 calories each day.   Healthy meal plan for weight management:  A healthy meal plan includes a variety of foods, contains fewer calories, and helps you stay healthy. A healthy meal plan includes the following:  Eat whole-grain foods more often.  A healthy meal plan should contain fiber. Fiber is the part of grains, fruits, and vegetables that is not broken down by your body. Whole-grain foods are healthy and provide extra fiber in your diet. Some examples of whole-grain foods are whole-wheat breads and pastas, oatmeal, brown rice, and bulgur.  Eat a variety of vegetables every day.  Include dark, leafy greens such as spinach, kale, damaris greens, and mustard greens. Eat yellow and orange vegetables such as carrots, sweet potatoes, and winter squash.   Eat a variety of fruits every day.  Choose fresh or canned fruit (canned in its own juice or light syrup) instead of juice. Fruit juice has very little or no fiber.  Eat low-fat dairy foods.  Drink fat-free (skim) milk or 1% milk. Eat fat-free yogurt and low-fat cottage cheese. Try low-fat cheeses such as mozzarella and other reduced-fat cheeses.  Choose meat and other protein foods that are low in fat.  Choose beans or other legumes such as split peas or lentils. Choose fish, skinless poultry (chicken or turkey), or lean cuts of red meat (beef or pork). Before you cook meat or poultry, cut off any visible fat.   Use less fat and oil.  Try baking foods instead of frying them. Add less fat, such as margarine, sour cream, regular salad dressing and mayonnaise to foods. Eat fewer high-fat foods. Some examples of high-fat foods include french fries, doughnuts, ice cream, and cakes.  Eat fewer sweets.  Limit  foods and drinks that are high in sugar. This includes candy, cookies, regular soda, and sweetened drinks.  Exercise:  Exercise at least 30 minutes per day on most days of the week. Some examples of exercise include walking, biking, dancing, and swimming. You can also fit in more physical activity by taking the stairs instead of the elevator or parking farther away from stores. Ask your healthcare provider about the best exercise plan for you.      © Copyright [employer] 2018 Information is for End User's use only and may not be sold, redistributed or otherwise used for commercial purposes. All illustrations and images included in CareNotes® are the copyrighted property of [employer] or [employer]

## 2024-01-31 NOTE — ASSESSMENT & PLAN NOTE
Lab Results   Component Value Date    EGFR 57 01/23/2024    EGFR 43 07/14/2023    EGFR 45 01/13/2023    CREATININE 1.14 01/23/2024    CREATININE 1.44 (H) 07/14/2023    CREATININE 1.39 (H) 01/13/2023   creatinine and GFR stable and improving  Will need periodic BMP  Avoid NSAIDs like ibuprofen Aleve Advil etc.  Avoid high potassium diet.  We will continue to monitor

## 2024-01-31 NOTE — PROGRESS NOTES
Assessment and Plan:     Problem List Items Addressed This Visit     Essential hypertension - Primary     Under control.  Continue lisinopril.  We will continue to monitor         Mixed hyperlipidemia     Under control.  Continue atorvastatin.  We will continue to monitor         Stage 3a chronic kidney disease (CKD) ([facility])     Lab Results   Component Value Date    EGFR 57 01/23/2024    EGFR 43 07/14/2023    EGFR 45 01/13/2023    CREATININE 1.14 01/23/2024    CREATININE 1.44 (H) 07/14/2023    CREATININE 1.39 (H) 01/13/2023   creatinine and GFR stable and improving  Will need periodic BMP  Avoid NSAIDs like ibuprofen Aleve Advil etc.  Avoid high potassium diet.  We will continue to monitor            PAD (peripheral artery disease) ([facility])     Under control.    Continue current medication.    We will re-evaluate at next office visit.          Other Visit Diagnoses     Medicare annual wellness visit, subsequent               Preventive health issues were discussed with patient, and age appropriate screening tests were ordered as noted in patient's After Visit Summary.  Personalized health advice and appropriate referrals for health education or preventive services given if needed, as noted in patient's After Visit Summary.     History of Present Illness:     Patient presents for a Medicare Wellness Visit    Patient here for review of chronic medical problems and  the labs and imaging if it is applicable.  Currently has no specific complaints other than mentioned in the review of systems  Denies chest pain, SOB, cough, abdominal pain, nausea, vomiting, fever, chills, lightheadedness,,headache, tingling or numbness.No bowel or bladder problem.         Patient Care Team:  Christopher Roy MD as PCP - General (Internal Medicine)     Review of Systems:     Review of Systems   Constitutional:  Negative for chills, fatigue and fever.   HENT:  Positive for hearing loss (decreased both ears). Negative for congestion, ear pain  (Discomfort both ears), rhinorrhea, sneezing and sore throat.    Eyes:  Positive for visual disturbance. Negative for redness and itching.   Respiratory:  Negative for cough, chest tightness and shortness of breath.    Cardiovascular:  Negative for chest pain, palpitations and leg swelling.   Gastrointestinal:  Negative for abdominal pain, blood in stool, diarrhea, nausea and vomiting.   Endocrine: Negative for cold intolerance and heat intolerance.   Genitourinary:  Negative for dysuria, frequency and urgency.   Musculoskeletal:  Negative for arthralgias, back pain and myalgias.   Skin:  Negative for color change and rash.   Neurological:  Positive for dizziness. Negative for weakness, light-headedness, numbness and headaches.   Hematological:  Does not bruise/bleed easily.   Psychiatric/Behavioral:  Negative for agitation, behavioral problems and confusion.         Problem List:     Patient Active Problem List   Diagnosis   • Essential hypertension   • Lipoma of axilla   • Mixed hyperlipidemia   • Elevated PSA   • Other microscopic hematuria   • Vertigo of central origin   • Bilateral sensorineural hearing loss   • Vertebral artery stenosis, left   • Bilateral carotid artery stenosis   • Visual changes   • Downbeat nystagmus   • History of colonic polyps   • Stage 3a chronic kidney disease (CKD) ([facility])   • PAD (peripheral artery disease) ([facility])      Past Medical and Surgical History:     Past Medical History:   Diagnosis Date   • BPH with obstruction/lower urinary tract symptoms    • Cataract    • Diverticulitis    • Diverticulosis    • Dizziness    • Ear problems    • Elevated PSA    • Hypercholesterolemia    • Hypertension    • Osteoarthritis    • Poor urinary stream    • Vertigo      Past Surgical History:   Procedure Laterality Date   • CATARACT EXTRACTION Left    • COLONOSCOPY  02/16/2004   • KNEE SURGERY Left 2005   • PROSTATE BIOPSY  2005   • ROTATOR CUFF REPAIR Right 06/15/2015      Family History:      Family History   Problem Relation Age of Onset   • Diabetes Mother    • Heart disease Mother    • Heart attack Mother    • Cancer Father    • Diabetes Father       Social History:     Social History     Socioeconomic History   • Marital status: /Civil Union     Spouse name: None   • Number of children: None   • Years of education: None   • Highest education level: None   Occupational History   • None   Tobacco Use   • Smoking status: Former     Types: Cigarettes     Passive exposure: Past   • Smokeless tobacco: Never   Vaping Use   • Vaping status: Never Used   Substance and Sexual Activity   • Alcohol use: Yes     Alcohol/week: 14.0 standard drinks of alcohol     Types: 14 Cans of beer per week     Comment: 1-2 beers a day   • Drug use: No   • Sexual activity: Not Currently   Other Topics Concern   • None   Social History Narrative   • None     Social Determinants of Health     Financial Resource Strain: Low Risk  (1/24/2024)    Overall Financial Resource Strain (CARDIA)    • Difficulty of Paying Living Expenses: Not hard at all   Food Insecurity: Not on file   Transportation Needs: No Transportation Needs (1/24/2024)    PRAPARE - Transportation    • Lack of Transportation (Medical): No    • Lack of Transportation (Non-Medical): No   Physical Activity: Not on file   Stress: Not on file   Social Connections: Not on file   Intimate Partner Violence: Not on file   Housing Stability: Not on file      Medications and Allergies:     Current Outpatient Medications   Medication Sig Dispense Refill   • aspirin 81 MG tablet Daily     • atorvastatin (LIPITOR) 40 mg tablet TAKE 1 TABLET BY MOUTH EVERYDAY AT BEDTIME 90 tablet 0   • Cholecalciferol (VITAMIN D3) 2000 units capsule Daily     • cycloSPORINE (Restasis) 0.05 % ophthalmic emulsion Restasis 0.05 % eye drops in a dropperette   PUT 1 DROP INTO BOTH EYES TWICE A DAY     • lisinopril (ZESTRIL) 10 mg tablet TAKE 1 TABLET BY MOUTH EVERY DAY 90 tablet 1   •  meclizine (ANTIVERT) 25 mg tablet Take 1 tablet (25 mg total) by mouth 3 (three) times a day 90 tablet 0     No current facility-administered medications for this visit.     No Known Allergies   Immunizations:     Immunization History   Administered Date(s) Administered   • COVID-19 PFIZER VACCINE 0.3 ML IM 01/20/2021, 02/10/2021, 10/22/2021, 05/09/2022   • INFLUENZA 04/02/2003, 10/07/2013, 10/31/2014, 10/31/2014, 10/14/2015, 11/01/2016, 10/20/2017   • Influenza Split High Dose Preservative Free IM 10/14/2015, 11/01/2016, 10/20/2017, 10/17/2018, 10/04/2019   • Influenza, high dose seasonal 0.7 mL 10/21/2020, 10/12/2021, 10/28/2022, 11/06/2023   • Influenza, seasonal, injectable, preservative free 10/07/2013   • Pneumococcal Conjugate 13-Valent 01/23/2016   • Pneumococcal Polysaccharide PPV23 01/25/2013   • Tdap 01/25/2013, 05/24/2022   • Zoster 07/15/2008      Health Maintenance:     There are no preventive care reminders to display for this patient.      Topic Date Due   • COVID-19 Vaccine (5 - 2023-24 season) 09/01/2023      Medicare Screening Tests and Risk Assessments:     Bay is here for his Subsequent Wellness visit.     Health Risk Assessment:   Patient rates overall health as good. Patient feels that their physical health rating is slightly better. Patient is satisfied with their life. Eyesight was rated as same. Hearing was rated as same. Patient feels that their emotional and mental health rating is same. Patients states they are never, rarely angry. Patient states they are sometimes unusually tired/fatigued. Pain experienced in the last 7 days has been some. Patient's pain rating has been 4/10. Patient states that he has experienced no weight loss or gain in last 6 months.     Fall Risk Screening:   In the past year, patient has experienced: no history of falling in past year      Home Safety:  Patient has trouble with stairs inside or outside of their home. Patient has working smoke alarms and has no  working carbon monoxide detector. Home safety hazards include: none.     Nutrition:   Current diet is Regular.     Medications:   Patient is not currently taking any over-the-counter supplements. Patient is able to manage medications.     Activities of Daily Living (ADLs)/Instrumental Activities of Daily Living (IADLs):   Walk and transfer into and out of bed and chair?: Yes  Dress and groom yourself?: Yes    Bathe or shower yourself?: Yes    Feed yourself? Yes  Do your laundry/housekeeping?: Yes  Manage your money, pay your bills and track your expenses?: Yes  Make your own meals?: Yes    Do your own shopping?: Yes    Previous Hospitalizations:   Any hospitalizations or ED visits within the last 12 months?: No      Advance Care Planning:   Living will: No    Durable POA for healthcare: No    Advanced directive: No      Cognitive Screening:   Provider or family/friend/caregiver concerned regarding cognition?: No    PREVENTIVE SCREENINGS      Cardiovascular Screening:    General: Screening Not Indicated and History Lipid Disorder      Diabetes Screening:     General: Screening Current      Colorectal Cancer Screening:     General: Screening Not Indicated      Prostate Cancer Screening:    General: Screening Not Indicated      Abdominal Aortic Aneurysm (AAA) Screening:    Risk factors include: tobacco use        Lung Cancer Screening:     General: Screening Not Indicated    Screening, Brief Intervention, and Referral to Treatment (SBIRT)    Screening  Typical number of drinks in a day: 1  Typical number of drinks in a week: 3  Interpretation: Low risk drinking behavior.    AUDIT-C Screenin) How often did you have a drink containing alcohol in the past year? 2 to 4 times a month  2) How many drinks did you have on a typical day when you were drinking in the past year? 1 to 2  3) How often did you have 6 or more drinks on one occasion in the past year? never    AUDIT-C Score: 2  Interpretation: Score 0-3 (male):  "Negative screen for alcohol misuse    Single Item Drug Screening:  How often have you used an illegal drug (including marijuana) or a prescription medication for non-medical reasons in the past year? never    Single Item Drug Screen Score: 0  Interpretation: Negative screen for possible drug use disorder    No results found.     Physical Exam:     /80 (BP Location: Right arm, Patient Position: Sitting, Cuff Size: Standard)   Pulse 82   Temp 97.9 °F (36.6 °C) (Temporal)   Resp 18   Ht 5' 9\" (1.753 m)   Wt 84.8 kg (187 lb)   SpO2 99%   BMI 27.62 kg/m²     Physical Exam  Vitals and nursing note reviewed.   Constitutional:       General: He is not in acute distress.     Appearance: Normal appearance. He is well-developed and normal weight. He is not ill-appearing, toxic-appearing or diaphoretic.   HENT:      Head: Normocephalic and atraumatic.      Right Ear: Tympanic membrane, ear canal and external ear normal. There is no impacted cerumen.      Left Ear: Tympanic membrane, ear canal and external ear normal. There is no impacted cerumen.      Nose: Nose normal. No congestion or rhinorrhea.      Mouth/Throat:      Mouth: Mucous membranes are moist.      Pharynx: Oropharynx is clear. No posterior oropharyngeal erythema.   Eyes:      General: No scleral icterus.        Right eye: No discharge.         Left eye: No discharge.      Extraocular Movements: Extraocular movements intact.      Conjunctiva/sclera: Conjunctivae normal.      Pupils: Pupils are equal, round, and reactive to light.   Cardiovascular:      Rate and Rhythm: Normal rate and regular rhythm.      Pulses: Normal pulses.      Heart sounds: Normal heart sounds. No murmur heard.     No gallop.   Pulmonary:      Effort: Pulmonary effort is normal. No respiratory distress.      Breath sounds: Normal breath sounds. No wheezing or rales.   Abdominal:      General: Abdomen is flat. Bowel sounds are normal. There is no distension.      Palpations: " Abdomen is soft.      Tenderness: There is no abdominal tenderness. There is no right CVA tenderness, left CVA tenderness or guarding.   Musculoskeletal:         General: No swelling or tenderness. Normal range of motion.      Cervical back: Normal range of motion and neck supple. No rigidity.      Right lower leg: No edema.      Left lower leg: No edema.   Lymphadenopathy:      Cervical: No cervical adenopathy.   Skin:     General: Skin is warm and dry.      Capillary Refill: Capillary refill takes 2 to 3 seconds.      Coloration: Skin is not jaundiced or pale.      Findings: No bruising.   Neurological:      General: No focal deficit present.      Mental Status: He is alert and oriented to person, place, and time. Mental status is at baseline.      Sensory: No sensory deficit.      Motor: No weakness.   Psychiatric:         Mood and Affect: Mood normal.         Behavior: Behavior normal.          Christopher Roy MD   Fluconazole Counseling:  Patient counseled regarding adverse effects of fluconazole including but not limited to headache, diarrhea, nausea, upset stomach, liver function test abnormalities, taste disturbance, and stomach pain.  There is a rare possibility of liver failure that can occur when taking fluconazole.  The patient understands that monitoring of LFTs and kidney function test may be required, especially at baseline. The patient verbalized understanding of the proper use and possible adverse effects of fluconazole.  All of the patient's questions and concerns were addressed.

## 2024-02-16 NOTE — H&P
H&P- Gabby Lau 1935, 80 y o  male MRN: [id number]    Unit/Bed#: ED 11 Encounter: [id number]    Primary Care Provider: Madhav Santiago MD   Date and time admitted to hospital: 7/7/2018  7:40 AM    * Generalized weakness   Assessment & Plan    Nausea with dry heaves since Wednesday  Overall very tired and weak  EMS noted fever 101 en route  Afebrile currently without significant leukocytosis  UA suggestive of dehydration and poor oral intake  Check procalcitonin  Hold empiric antibiotics   Supportive care (IVF, APAP, antiemetics)        Thrombocytopenia (HCC)   Assessment & Plan    Incidentally noted on labs; pt unaware of this issue  Will trend; ?if associated with occult infection        Hyponatremia   Assessment & Plan    Mild; likely hypovolemic  IVF   Trend BMP             VTE Prophylaxis: Enoxaparin (Lovenox)  / sequential compression device   Code Status: FULL   POLST: POLST form is not discussed and not completed at this time  Anticipated Length of Stay:  Patient will be admitted on an Observation basis with an anticipated length of stay of  < 2 midnights  Justification for Hospital Stay: weakness, nausea and vomiting, fevers at home     Total Time for Visit, including Counseling / Coordination of Care: 45 minutes  Greater than 50% of this total time spent on direct patient counseling and coordination of care  Chief Complaint:   Not feeling well    History of Present Illness:    Gabby Lau is a 80 y o  male history of hypertension, hyperlipidemia who presents with several days of not feeling well  Patient's symptoms started approximately Wednesday with nausea and dry heaves  Patient reports he has been unable to keep down any solid food due to this nausea  States he was trying to stay hydrated over the same period  He denies fevers, does endorse chills  He did have a fever recorded in route to the hospital today  No cough or cold-like symptoms to suggest URI    No obvious are clear dysuria  He has no loose stool or diarrhea  He has been more constipated than usual over the last month  No significant tick exposure  No sick contacts  Patient reports he is feeling a lot better after receiving some IV fluids  Review of Systems:    Review of Systems   Constitutional: Positive for chills, fatigue and fever  HENT: Negative  Respiratory: Negative  Negative for cough, chest tightness and shortness of breath  Cardiovascular: Negative for chest pain and palpitations  Gastrointestinal: Positive for constipation and nausea  Negative for abdominal pain, diarrhea and vomiting  Genitourinary: Negative for dysuria and hematuria  Musculoskeletal: Negative for arthralgias and myalgias  Neurological: Positive for weakness  All other systems reviewed and are negative  Past Medical and Surgical History:     Past Medical History:   Diagnosis Date    BPH with obstruction/lower urinary tract symptoms     Cataract     Elevated PSA     Hypercholesterolemia     Hypertension     Poor urinary stream        Past Surgical History:   Procedure Laterality Date    CATARACT EXTRACTION      KNEE SURGERY  2005    PROSTATE BIOPSY  2005    ROTATOR CUFF REPAIR  06/15/2015       Meds/Allergies:    Prior to Admission medications    Medication Sig Start Date End Date Taking? Authorizing Provider   aspirin 81 MG tablet Daily   Yes Historical Provider, MD   atorvastatin (LIPITOR) 10 mg tablet Take 10 mg by mouth daily at bedtime 4/7/18  Yes Historical Provider, MD   Cholecalciferol (VITAMIN D3) 2000 units capsule Daily   Yes Historical Provider, MD   lisinopril (ZESTRIL) 10 mg tablet Take 10 mg by mouth daily 4/7/18  Yes Historical Provider, MD   pravastatin (PRAVACHOL) 20 mg tablet pravastatin 20 mg tablet  7/7/18  Historical Provider, MD     I have reviewed home medications with patient personally      Allergies: No Known Allergies    Social History:     Marital Status: /Civil Union Occupation: retired   Patient Pre-hospital Living Situation: with wife  Patient Pre-hospital Level of Mobility: independent  Patient Pre-hospital Diet Restrictions: none  Substance Use History:   History   Alcohol Use    8 4 oz/week    14 Cans of beer per week     Comment: 1-2 beers a day     History   Smoking Status    Former Smoker   Smokeless Tobacco    Never Used     History   Drug Use No       Family History:    Family History   Problem Relation Age of Onset    Diabetes Mother     Heart disease Mother     Cancer Father     Diabetes Father        Physical Exam:     Vitals:   Blood Pressure: 109/58 (07/07/18 1200)  Pulse: 58 (07/07/18 1200)  Temperature: 98 3 °F (36 8 °C) (07/07/18 1039)  Temp Source: Oral (07/07/18 1039)  Respirations: 18 (07/07/18 1200)  SpO2: 94 % (07/07/18 1200)    Physical Exam   Constitutional: He is oriented to person, place, and time  He appears well-developed and well-nourished  No distress  HENT:   Head: Normocephalic and atraumatic  Eyes: Conjunctivae are normal  No scleral icterus  Cardiovascular: Normal rate, regular rhythm and normal heart sounds  Exam reveals no friction rub  No murmur heard  Pulmonary/Chest: Effort normal and breath sounds normal  No respiratory distress  He has no wheezes  He has no rales  He exhibits no tenderness  Abdominal: Soft  Bowel sounds are normal  He exhibits no distension  There is no tenderness  There is no rebound and no guarding  Musculoskeletal: Normal range of motion  He exhibits no edema, tenderness or deformity  Neurological: He is alert and oriented to person, place, and time  No cranial nerve deficit  Skin: Skin is warm and dry  No rash noted  He is not diaphoretic  No erythema  No pallor  Psychiatric: He has a normal mood and affect  His behavior is normal    Nursing note and vitals reviewed  Additional Data:     Lab Results: I have personally reviewed pertinent reports          Results from last 7 days  Lab Units 07/07/18  0810   WBC Thousand/uL 6 39   HEMOGLOBIN g/dL 14 8   HEMATOCRIT % 44 0   PLATELETS Thousands/uL 94*   NEUTROS PCT % 85*   LYMPHS PCT % 9*   MONOS PCT % 6   EOS PCT % 0       Results from last 7 days  Lab Units 07/07/18  0810   SODIUM mmol/L 134*   POTASSIUM mmol/L 4 2   CHLORIDE mmol/L 100   CO2 mmol/L 26   BUN mg/dL 16   CREATININE mg/dL 1 23   CALCIUM mg/dL 8 2*   TOTAL PROTEIN g/dL 6 8   BILIRUBIN TOTAL mg/dL 0 70   ALK PHOS U/L 75   ALT U/L 46   AST U/L 50*   GLUCOSE RANDOM mg/dL 145*           Imaging: I have personally reviewed pertinent reports  Ct Abdomen Pelvis Wo Contrast    Result Date: 7/7/2018  Narrative: CT ABDOMEN AND PELVIS WITHOUT IV CONTRAST INDICATION:   LLQ pain, suspect diverticulitis  COMPARISON: None  TECHNIQUE:  CT examination of the abdomen and pelvis was performed without intravenous contrast   Axial, sagittal, and coronal 2D reformatted images were created from the source data and submitted for interpretation  Radiation dose length product (DLP) for this visit:  434 mGy-cm   This examination, like all CT scans performed in the [facility], was performed utilizing techniques to minimize radiation dose exposure, including the use of iterative reconstruction and automated exposure control  Enteric contrast was administered  FINDINGS: ABDOMEN LOWER CHEST:  Atelectatic changes are noted at the lung bases  LIVER/BILIARY TREE:  Liver is diffusely decreased in density consistent with fatty change  Normal hepatic contours  No biliary dilatation  GALLBLADDER:  No calcified gallstones  No pericholecystic inflammatory change  SPLEEN:  Unremarkable  PANCREAS:  Unremarkable  ADRENAL GLANDS:  Unremarkable  KIDNEYS/URETERS:  No hydronephrosis  There is a 16 mm intermediate density exophytic lesion at the left renal upper pole (series 2, image 25  An adjacent probable cyst measures 14 mm    There is a questionable punctate calculus at the right UVJ causing no hydronephrosis  STOMACH AND BOWEL:  There is colonic diverticulosis without evidence of acute diverticulitis  APPENDIX:  A normal appendix was visualized  ABDOMINOPELVIC CAVITY:  No ascites or free intraperitoneal air  No lymphadenopathy  VESSELS:  Unremarkable for patient's age  PELVIS REPRODUCTIVE ORGANS:  The prostate gland is enlarged measuring 5 6 x 4 7 cm  URINARY BLADDER:  Unremarkable  ABDOMINAL WALL/INGUINAL REGIONS:  Unremarkable  OSSEOUS STRUCTURES:  No acute fracture or destructive osseous lesion  There are osteoarthritic degenerative changes of both hip joints  Impression: Scattered colonic diverticulosis with no inflammatory changes present to suggest acute diverticulitis  No free air or free fluid  Questionable punctate 1 mm calcified calculus at the right UVJ causing no significant right-sided hydronephrosis or hydroureter  16mm intermediate density lesion noted at the left renal upper pole which may represent a hemorrhagic/proteinaceous cyst   However, solid neoplasm cannot be excluded  Therefore, a nonemergent enhanced CT or MRI of the kidneys is recommended for further characterization  Workstation performed: [id number]       EKG, Pathology, and Other Studies Reviewed on Admission:   · EKG: NSR 77    Allscripts / Epic Records Reviewed: Yes      ** Please Note: This note has been constructed using a voice recognition system   ** No (0)

## 2024-04-04 DIAGNOSIS — I65.29 CAROTID STENOSIS: ICD-10-CM

## 2024-04-04 RX ORDER — ATORVASTATIN CALCIUM 40 MG/1
TABLET, FILM COATED ORAL
Qty: 90 TABLET | Refills: 1 | Status: SHIPPED | OUTPATIENT
Start: 2024-04-04

## 2024-05-01 ENCOUNTER — OFFICE VISIT (OUTPATIENT)
Dept: FAMILY MEDICINE CLINIC | Facility: CLINIC | Age: 89
End: 2024-05-01
Payer: COMMERCIAL

## 2024-05-01 VITALS
OXYGEN SATURATION: 97 % | SYSTOLIC BLOOD PRESSURE: 126 MMHG | WEIGHT: 190.4 LBS | BODY MASS INDEX: 28.2 KG/M2 | HEIGHT: 69 IN | HEART RATE: 64 BPM | TEMPERATURE: 98.2 F | DIASTOLIC BLOOD PRESSURE: 80 MMHG | RESPIRATION RATE: 18 BRPM

## 2024-05-01 DIAGNOSIS — N18.31 STAGE 3A CHRONIC KIDNEY DISEASE (CKD) (HCC): ICD-10-CM

## 2024-05-01 DIAGNOSIS — I73.9 PAD (PERIPHERAL ARTERY DISEASE) (HCC): ICD-10-CM

## 2024-05-01 DIAGNOSIS — E78.2 MIXED HYPERLIPIDEMIA: ICD-10-CM

## 2024-05-01 DIAGNOSIS — I10 ESSENTIAL HYPERTENSION: Primary | ICD-10-CM

## 2024-05-01 PROCEDURE — 1159F MED LIST DOCD IN RCRD: CPT

## 2024-05-01 PROCEDURE — 1160F RVW MEDS BY RX/DR IN RCRD: CPT

## 2024-05-01 PROCEDURE — 99213 OFFICE O/P EST LOW 20 MIN: CPT

## 2024-05-01 PROCEDURE — G2211 COMPLEX E/M VISIT ADD ON: HCPCS

## 2024-05-01 NOTE — PROGRESS NOTES
Assessment/Plan:         Problem List Items Addressed This Visit     Essential hypertension - Primary     Under control.  Continue lisinopril.  We will continue to monitor         Mixed hyperlipidemia     Under control.  Continue atorvastatin.  We will continue to monitor         Stage 3a chronic kidney disease (CKD) (HCC)     Lab Results   Component Value Date    EGFR 57 01/23/2024    EGFR 43 07/14/2023    EGFR 45 01/13/2023    CREATININE 1.14 01/23/2024    CREATININE 1.44 (H) 07/14/2023    CREATININE 1.39 (H) 01/13/2023   creatinine and GFR stable and improving  Will need periodic BMP  Avoid NSAIDs like ibuprofen Aleve Advil etc.  Avoid high potassium diet.  We will continue to monitor            PAD (peripheral artery disease) ([facility])     Under control.    Continue current medication.    We will re-evaluate at next office visit.                Subjective:      Patient ID: Bay Real is a 88 y.o. male.    Patient here for review of chronic medical problems and  the labs and imaging if it is applicable.  Currently has no specific complaints other than mentioned in the review of systems  Denies chest pain, SOB, cough, abdominal pain, nausea, vomiting, fever, chills, lightheadedness, dizziness,headache, tingling or numbness.No bowel or bladder problem.          The following portions of the patient's history were reviewed and updated as appropriate:   Past Medical History:  He has a past medical history of BPH with obstruction/lower urinary tract symptoms, Cataract, Diverticulitis, Diverticulosis, Dizziness, Ear problems, Elevated PSA, Hypercholesterolemia, Hypertension, Osteoarthritis, Poor urinary stream, and Vertigo.,  _______________________________________________________________________  Medical Problems:  does not have any pertinent problems on file.,  _______________________________________________________________________  Past Surgical History:   has a past surgical history that includes Prostate biopsy  (2005); Cataract extraction (Left); Knee surgery (Left, 2005); Rotator cuff repair (Right, 06/15/2015); and Colonoscopy (02/16/2004).,  _______________________________________________________________________  Family History:  family history includes Cancer in his father; Diabetes in his father and mother; Heart attack in his mother; Heart disease in his mother.,  _______________________________________________________________________  Social History:   reports that he has quit smoking. His smoking use included cigarettes. He has been exposed to tobacco smoke. He has never used smokeless tobacco. He reports current alcohol use of about 14.0 standard drinks of alcohol per week. He reports that he does not use drugs.,  _______________________________________________________________________  Allergies:  has No Known Allergies..  _______________________________________________________________________  Current Outpatient Medications   Medication Sig Dispense Refill   • aspirin 81 MG tablet Daily     • atorvastatin (LIPITOR) 40 mg tablet TAKE 1 TABLET BY MOUTH EVERYDAY AT BEDTIME 90 tablet 1   • Cholecalciferol (VITAMIN D3) 2000 units capsule Daily     • cycloSPORINE (Restasis) 0.05 % ophthalmic emulsion Restasis 0.05 % eye drops in a dropperette   PUT 1 DROP INTO BOTH EYES TWICE A DAY     • lisinopril (ZESTRIL) 10 mg tablet TAKE 1 TABLET BY MOUTH EVERY DAY 90 tablet 1   • meclizine (ANTIVERT) 25 mg tablet Take 1 tablet (25 mg total) by mouth 3 (three) times a day 90 tablet 0     No current facility-administered medications for this visit.     _______________________________________________________________________  Review of Systems   Constitutional:  Negative for chills, fatigue and fever.   HENT:  Positive for hearing loss (decreased both ears). Negative for congestion, ear pain (Discomfort both ears), rhinorrhea, sneezing and sore throat.    Eyes:  Positive for visual disturbance. Negative for redness and itching.  "  Respiratory:  Negative for cough, chest tightness and shortness of breath.    Cardiovascular:  Negative for chest pain, palpitations and leg swelling.   Gastrointestinal:  Negative for abdominal pain, blood in stool, diarrhea, nausea and vomiting.   Endocrine: Negative for cold intolerance and heat intolerance.   Genitourinary:  Negative for dysuria, frequency and urgency.   Musculoskeletal:  Negative for arthralgias, back pain and myalgias.   Skin:  Negative for color change and rash.   Neurological:  Positive for dizziness. Negative for weakness, light-headedness, numbness and headaches.   Hematological:  Does not bruise/bleed easily.   Psychiatric/Behavioral:  Negative for agitation, behavioral problems and confusion.          Objective:  Vitals:    05/01/24 1000   BP: 126/80   BP Location: Right arm   Patient Position: Sitting   Cuff Size: Standard   Pulse: 64   Resp: 18   Temp: 98.2 °F (36.8 °C)   TempSrc: Temporal   SpO2: 97%   Weight: 86.4 kg (190 lb 6.4 oz)   Height: 5' 9\" (1.753 m)     Body mass index is 28.12 kg/m².     Physical Exam  Vitals and nursing note reviewed.   Constitutional:       General: He is not in acute distress.     Appearance: Normal appearance. He is not ill-appearing, toxic-appearing or diaphoretic.   HENT:      Head: Normocephalic and atraumatic.      Nose: Nose normal. No congestion.      Mouth/Throat:      Mouth: Mucous membranes are moist.      Pharynx: No oropharyngeal exudate or posterior oropharyngeal erythema.   Eyes:      General: No scleral icterus.        Right eye: No discharge.         Left eye: No discharge.      Extraocular Movements: Extraocular movements intact.      Conjunctiva/sclera: Conjunctivae normal.      Pupils: Pupils are equal, round, and reactive to light.   Cardiovascular:      Rate and Rhythm: Normal rate and regular rhythm.      Pulses: Normal pulses.      Heart sounds: Normal heart sounds. No murmur heard.     No gallop.   Pulmonary:      Effort: " Pulmonary effort is normal. No respiratory distress.      Breath sounds: Normal breath sounds. No wheezing, rhonchi or rales.   Abdominal:      General: Abdomen is flat. Bowel sounds are normal. There is no distension.      Palpations: Abdomen is soft.      Tenderness: There is no abdominal tenderness. There is no right CVA tenderness, left CVA tenderness or guarding.   Musculoskeletal:         General: No swelling or tenderness. Normal range of motion.      Cervical back: Normal range of motion and neck supple. No rigidity.      Right lower leg: No edema.      Left lower leg: No edema.   Lymphadenopathy:      Cervical: No cervical adenopathy.   Skin:     General: Skin is warm.      Capillary Refill: Capillary refill takes 2 to 3 seconds.      Coloration: Skin is not jaundiced.      Findings: No bruising or rash.   Neurological:      General: No focal deficit present.      Mental Status: He is alert and oriented to person, place, and time. Mental status is at baseline.      Sensory: No sensory deficit.      Motor: No weakness.      Gait: Gait normal.   Psychiatric:         Mood and Affect: Mood normal.         Behavior: Behavior normal.

## 2024-05-21 ENCOUNTER — APPOINTMENT (EMERGENCY)
Dept: RADIOLOGY | Facility: HOSPITAL | Age: 89
End: 2024-05-21
Payer: COMMERCIAL

## 2024-05-21 ENCOUNTER — TELEPHONE (OUTPATIENT)
Age: 89
End: 2024-05-21

## 2024-05-21 ENCOUNTER — HOSPITAL ENCOUNTER (EMERGENCY)
Facility: HOSPITAL | Age: 89
Discharge: HOME | End: 2024-05-21
Attending: EMERGENCY MEDICINE
Payer: COMMERCIAL

## 2024-05-21 VITALS
SYSTOLIC BLOOD PRESSURE: 179 MMHG | WEIGHT: 190 LBS | RESPIRATION RATE: 16 BRPM | DIASTOLIC BLOOD PRESSURE: 81 MMHG | TEMPERATURE: 98 F | BODY MASS INDEX: 28.06 KG/M2 | OXYGEN SATURATION: 99 % | HEART RATE: 81 BPM

## 2024-05-21 DIAGNOSIS — S62.609A FINGER FRACTURE: ICD-10-CM

## 2024-05-21 DIAGNOSIS — S66.829A EXTENSOR TENDON LACERATION OF HAND WITH OPEN WOUND: Primary | ICD-10-CM

## 2024-05-21 DIAGNOSIS — S61.409A EXTENSOR TENDON LACERATION OF HAND WITH OPEN WOUND: Primary | ICD-10-CM

## 2024-05-21 LAB
ALBUMIN SERPL BCP-MCNC: 3.7 G/DL (ref 3.5–5)
ALP SERPL-CCNC: 61 U/L (ref 34–104)
ALT SERPL W P-5'-P-CCNC: 18 U/L (ref 7–52)
ANION GAP SERPL CALCULATED.3IONS-SCNC: 6 MMOL/L (ref 4–13)
AST SERPL W P-5'-P-CCNC: 23 U/L (ref 13–39)
BASOPHILS # BLD AUTO: 0.06 THOUSANDS/ÂΜL (ref 0–0.1)
BASOPHILS NFR BLD AUTO: 1 % (ref 0–1)
BILIRUB SERPL-MCNC: 0.61 MG/DL (ref 0.2–1)
BUN SERPL-MCNC: 18 MG/DL (ref 5–25)
CALCIUM SERPL-MCNC: 8.7 MG/DL (ref 8.4–10.2)
CHLORIDE SERPL-SCNC: 105 MMOL/L (ref 96–108)
CO2 SERPL-SCNC: 23 MMOL/L (ref 21–32)
CREAT SERPL-MCNC: 1.07 MG/DL (ref 0.6–1.3)
EOSINOPHIL # BLD AUTO: 0.14 THOUSAND/ÂΜL (ref 0–0.61)
EOSINOPHIL NFR BLD AUTO: 2 % (ref 0–6)
ERYTHROCYTE [DISTWIDTH] IN BLOOD BY AUTOMATED COUNT: 13.7 % (ref 11.6–15.1)
GFR SERPL CREATININE-BSD FRML MDRD: 61 ML/MIN/1.73SQ M
GLUCOSE SERPL-MCNC: 145 MG/DL (ref 65–140)
HCT VFR BLD AUTO: 41.7 % (ref 36.5–49.3)
HGB BLD-MCNC: 13.9 G/DL (ref 12–17)
IMM GRANULOCYTES # BLD AUTO: 0.03 THOUSAND/UL (ref 0–0.2)
IMM GRANULOCYTES NFR BLD AUTO: 0 % (ref 0–2)
INR PPP: 1.05 (ref 0.84–1.19)
LYMPHOCYTES # BLD AUTO: 2.12 THOUSANDS/ÂΜL (ref 0.6–4.47)
LYMPHOCYTES NFR BLD AUTO: 26 % (ref 14–44)
MCH RBC QN AUTO: 30.8 PG (ref 26.8–34.3)
MCHC RBC AUTO-ENTMCNC: 33.3 G/DL (ref 31.4–37.4)
MCV RBC AUTO: 93 FL (ref 82–98)
MONOCYTES # BLD AUTO: 0.66 THOUSAND/ÂΜL (ref 0.17–1.22)
MONOCYTES NFR BLD AUTO: 8 % (ref 4–12)
NEUTROPHILS # BLD AUTO: 5.15 THOUSANDS/ÂΜL (ref 1.85–7.62)
NEUTS SEG NFR BLD AUTO: 63 % (ref 43–75)
NRBC BLD AUTO-RTO: 0 /100 WBCS
PLATELET # BLD AUTO: 188 THOUSANDS/UL (ref 149–390)
PMV BLD AUTO: 9.4 FL (ref 8.9–12.7)
POTASSIUM SERPL-SCNC: 4.2 MMOL/L (ref 3.5–5.3)
PROT SERPL-MCNC: 6.3 G/DL (ref 6.4–8.4)
PROTHROMBIN TIME: 14.3 SECONDS (ref 11.6–14.5)
RBC # BLD AUTO: 4.51 MILLION/UL (ref 3.88–5.62)
SODIUM SERPL-SCNC: 134 MMOL/L (ref 135–147)
WBC # BLD AUTO: 8.16 THOUSAND/UL (ref 4.31–10.16)

## 2024-05-21 PROCEDURE — 85025 COMPLETE CBC W/AUTO DIFF WBC: CPT | Performed by: EMERGENCY MEDICINE

## 2024-05-21 PROCEDURE — 36415 COLL VENOUS BLD VENIPUNCTURE: CPT | Performed by: EMERGENCY MEDICINE

## 2024-05-21 PROCEDURE — 96365 THER/PROPH/DIAG IV INF INIT: CPT

## 2024-05-21 PROCEDURE — 99284 EMERGENCY DEPT VISIT MOD MDM: CPT

## 2024-05-21 PROCEDURE — 73030 X-RAY EXAM OF SHOULDER: CPT

## 2024-05-21 PROCEDURE — 80053 COMPREHEN METABOLIC PANEL: CPT | Performed by: EMERGENCY MEDICINE

## 2024-05-21 PROCEDURE — 85610 PROTHROMBIN TIME: CPT | Performed by: EMERGENCY MEDICINE

## 2024-05-21 PROCEDURE — 90715 TDAP VACCINE 7 YRS/> IM: CPT | Performed by: EMERGENCY MEDICINE

## 2024-05-21 PROCEDURE — 90471 IMMUNIZATION ADMIN: CPT

## 2024-05-21 PROCEDURE — 73130 X-RAY EXAM OF HAND: CPT

## 2024-05-21 PROCEDURE — 12002 RPR S/N/AX/GEN/TRNK2.6-7.5CM: CPT | Performed by: EMERGENCY MEDICINE

## 2024-05-21 PROCEDURE — 99284 EMERGENCY DEPT VISIT MOD MDM: CPT | Performed by: EMERGENCY MEDICINE

## 2024-05-21 RX ORDER — CEPHALEXIN 500 MG/1
500 CAPSULE ORAL EVERY 6 HOURS SCHEDULED
Qty: 28 CAPSULE | Refills: 0 | Status: SHIPPED | OUTPATIENT
Start: 2024-05-21 | End: 2024-05-28

## 2024-05-21 RX ORDER — GINSENG 100 MG
2 CAPSULE ORAL ONCE
Status: COMPLETED | OUTPATIENT
Start: 2024-05-21 | End: 2024-05-21

## 2024-05-21 RX ORDER — CEFAZOLIN SODIUM 2 G/50ML
2000 SOLUTION INTRAVENOUS ONCE
Status: COMPLETED | OUTPATIENT
Start: 2024-05-21 | End: 2024-05-21

## 2024-05-21 RX ORDER — LIDOCAINE HYDROCHLORIDE 10 MG/ML
20 INJECTION, SOLUTION EPIDURAL; INFILTRATION; INTRACAUDAL; PERINEURAL ONCE
Status: COMPLETED | OUTPATIENT
Start: 2024-05-21 | End: 2024-05-21

## 2024-05-21 RX ADMIN — CEFAZOLIN SODIUM 2000 MG: 2 SOLUTION INTRAVENOUS at 11:43

## 2024-05-21 RX ADMIN — LIDOCAINE HYDROCHLORIDE 20 ML: 10 INJECTION, SOLUTION EPIDURAL; INFILTRATION; INTRACAUDAL at 11:44

## 2024-05-21 RX ADMIN — TETANUS TOXOID, REDUCED DIPHTHERIA TOXOID AND ACELLULAR PERTUSSIS VACCINE, ADSORBED 0.5 ML: 5; 2.5; 8; 8; 2.5 SUSPENSION INTRAMUSCULAR at 11:44

## 2024-05-21 RX ADMIN — BACITRACIN 2 LARGE APPLICATION: 500 OINTMENT TOPICAL at 14:19

## 2024-05-21 NOTE — ED PROVIDER NOTES
History  Chief Complaint   Patient presents with    Fall     Pt fell while working in [employer], [employer], [employer], -loc. Multiple skin tears noted on L arm/hand. 3rd finger of L hand is bent and pt cannot move it.      88-year-old male with prior medical history as listed presents for evaluation of mechanical fall.  Patient fell and landed to his left hand.  He denies hitting his head.  Denies any LOC.  No nausea vomiting.  No abdominal pain.  No hip pain.  He was able to get up and ambulate afterwards.  Has several lacerations to the left hand digits.      History provided by:  Patient  Fall  Associated symptoms: no abdominal pain, no back pain, no chest pain, no headaches, no nausea, no seizures and no vomiting        Prior to Admission Medications   Prescriptions Last Dose Informant Patient Reported? Taking?   Cholecalciferol (VITAMIN D3) 2000 units capsule  Self Yes No   Sig: Daily   aspirin 81 MG tablet  Self Yes No   Sig: Daily   atorvastatin (LIPITOR) 40 mg tablet   No No   Sig: TAKE 1 TABLET BY MOUTH EVERYDAY AT BEDTIME   cycloSPORINE (Restasis) 0.05 % ophthalmic emulsion   Yes No   Sig: Restasis 0.05 % eye drops in a dropperette   PUT 1 DROP INTO BOTH EYES TWICE A DAY   lisinopril (ZESTRIL) 10 mg tablet   No No   Sig: TAKE 1 TABLET BY MOUTH EVERY DAY   meclizine (ANTIVERT) 25 mg tablet   No No   Sig: Take 1 tablet (25 mg total) by mouth 3 (three) times a day      Facility-Administered Medications: None       Past Medical History:   Diagnosis Date    BPH with obstruction/lower urinary tract symptoms     Cataract     Diverticulitis     Diverticulosis     Dizziness     Ear problems     Elevated PSA     Hypercholesterolemia     Hypertension     Osteoarthritis     Poor urinary stream     Vertigo        Past Surgical History:   Procedure Laterality Date    CATARACT EXTRACTION Left     COLONOSCOPY  02/16/2004    KNEE SURGERY Left 2005    PROSTATE BIOPSY  2005    ROTATOR CUFF REPAIR Right 06/15/2015       Family History    Problem Relation Age of Onset    Diabetes Mother     Heart disease Mother     Heart attack Mother     Cancer Father     Diabetes Father      I have reviewed and agree with the history as documented.    E-Cigarette/Vaping    E-Cigarette Use Never User      E-Cigarette/Vaping Substances    Nicotine No     THC No     CBD No     Flavoring No     Other No     Unknown No      Social History     Tobacco Use    Smoking status: Former     Types: Cigarettes     Passive exposure: Past    Smokeless tobacco: Never   Vaping Use    Vaping status: Never Used   Substance Use Topics    Alcohol use: Yes     Alcohol/week: 14.0 standard drinks of alcohol     Types: 14 Cans of beer per week     Comment: 1-2 beers a day    Drug use: No       Review of Systems   Constitutional:  Negative for activity change, appetite change, chills and fever.   HENT:  Negative for congestion, dental problem, drooling, ear pain, nosebleeds, rhinorrhea and sore throat.    Eyes:  Negative for pain, discharge and visual disturbance.   Respiratory:  Negative for apnea, cough, chest tightness and shortness of breath.    Cardiovascular:  Negative for chest pain and palpitations.   Gastrointestinal:  Negative for abdominal distention, abdominal pain, diarrhea, nausea and vomiting.   Endocrine: Negative for cold intolerance, heat intolerance, polydipsia and polyphagia.   Genitourinary:  Negative for difficulty urinating, dysuria, enuresis, flank pain and hematuria.   Musculoskeletal:  Negative for arthralgias and back pain.   Skin:  Negative for color change and rash.   Allergic/Immunologic: Negative for environmental allergies and food allergies.   Neurological:  Negative for dizziness, seizures, syncope, facial asymmetry, light-headedness and headaches.   Hematological:  Negative for adenopathy.   Psychiatric/Behavioral:  Negative for agitation, confusion, dysphoric mood and hallucinations. The patient is not hyperactive.    All other systems reviewed and are  negative.      Physical Exam  Physical Exam  Vitals and nursing note reviewed.   Constitutional:       General: He is not in acute distress.     Appearance: He is well-developed.   HENT:      Head: Normocephalic and atraumatic.      Mouth/Throat:      Mouth: Mucous membranes are moist.   Eyes:      Extraocular Movements: Extraocular movements intact.      Conjunctiva/sclera: Conjunctivae normal.      Pupils: Pupils are equal, round, and reactive to light.   Cardiovascular:      Rate and Rhythm: Normal rate and regular rhythm.      Pulses: Normal pulses.      Heart sounds: No murmur heard.  Pulmonary:      Effort: Pulmonary effort is normal. No respiratory distress.      Breath sounds: Normal breath sounds.   Abdominal:      Palpations: Abdomen is soft.      Tenderness: There is no abdominal tenderness.   Musculoskeletal:         General: No tenderness or deformity.      Cervical back: Neck supple.      Comments: Several lacerations involving left index middle and ring fingers.  Middle finger with an open deformity.  Cap refill.  No evidence of foreign bodies in the wound.   Skin:     General: Skin is warm and dry.      Capillary Refill: Capillary refill takes less than 2 seconds.   Neurological:      Mental Status: He is alert.   Psychiatric:         Mood and Affect: Mood normal.         Vital Signs  ED Triage Vitals   Temperature Pulse Respirations Blood Pressure SpO2   05/21/24 1043 05/21/24 1043 05/21/24 1043 05/21/24 1043 05/21/24 1043   98 °F (36.7 °C) 77 16 131/61 97 %      Temp Source Heart Rate Source Patient Position - Orthostatic VS BP Location FiO2 (%)   05/21/24 1043 05/21/24 1043 05/21/24 1349 05/21/24 1043 --   Oral Monitor Sitting Left arm       Pain Score       --                  Vitals:    05/21/24 1043 05/21/24 1349   BP: 131/61 (!) 179/81   Pulse: 77 81   Patient Position - Orthostatic VS:  Sitting         Visual Acuity  Visual Acuity      Flowsheet Row Most Recent Value   L Pupil Size (mm) 4   R  Pupil Size (mm) 4            ED Medications  Medications   bacitracin topical ointment 2 large application (has no administration in time range)   ceFAZolin (ANCEF) IVPB (premix in dextrose) 2,000 mg 50 mL (0 mg Intravenous Stopped 5/21/24 1227)   tetanus-diphtheria-acellular pertussis (BOOSTRIX) IM injection 0.5 mL (0.5 mL Intramuscular Given 5/21/24 1144)   lidocaine (PF) (XYLOCAINE-MPF) 1 % injection 20 mL (20 mL Infiltration Given 5/21/24 1144)       Diagnostic Studies  Results Reviewed       Procedure Component Value Units Date/Time    Comprehensive metabolic panel [[id number]]  (Abnormal) Collected: 05/21/24 1125    Lab Status: Final result Specimen: Blood from Arm, Right Updated: 05/21/24 1150     Sodium 134 mmol/L      Potassium 4.2 mmol/L      Chloride 105 mmol/L      CO2 23 mmol/L      ANION GAP 6 mmol/L      BUN 18 mg/dL      Creatinine 1.07 mg/dL      Glucose 145 mg/dL      Calcium 8.7 mg/dL      AST 23 U/L      ALT 18 U/L      Alkaline Phosphatase 61 U/L      Total Protein 6.3 g/dL      Albumin 3.7 g/dL      Total Bilirubin 0.61 mg/dL      eGFR 61 ml/min/1.73sq m     Narrative:      National Kid[employer] Foundation guidelines for Chronic Kidney Disease (CKD):     Stage 1 with normal or high GFR (GFR > 90 mL/min/1.73 square meters)    Stage 2 Mild CKD (GFR = 60-89 mL/min/1.73 square meters)    Stage 3A Moderate CKD (GFR = 45-59 mL/min/1.73 square meters)    Stage 3B Moderate CKD (GFR = 30-44 mL/min/1.73 square meters)    Stage 4 Severe CKD (GFR = 15-29 mL/min/1.73 square meters)    Stage 5 End Stage CKD (GFR <15 mL/min/1.73 square meters)  Note: GFR calculation is accurate only with a steady state creatinine    Protime-INR [[id number]]  (Normal) Collected: 05/21/24 1125    Lab Status: Final result Specimen: Blood from Arm, Right Updated: 05/21/24 1148     Protime 14.3 seconds      INR 1.05    CBC and differential [[id number]] Collected: 05/21/24 1125    Lab Status: Final result Specimen: Blood from Arm,  Right Updated: 05/21/24 1135     WBC 8.16 Thousand/uL      RBC 4.51 Million/uL      Hemoglobin 13.9 g/dL      Hematocrit 41.7 %      MCV 93 fL      MCH 30.8 pg      MCHC 33.3 g/dL      RDW 13.7 %      MPV 9.4 fL      Platelets 188 Thousands/uL      nRBC 0 /100 WBCs      Segmented % 63 %      Immature Grans % 0 %      Lymphocytes % 26 %      Monocytes % 8 %      Eosinophils Relative 2 %      Basophils Relative 1 %      Absolute Neutrophils 5.15 Thousands/µL      Absolute Immature Grans 0.03 Thousand/uL      Absolute Lymphocytes 2.12 Thousands/µL      Absolute Monocytes 0.66 Thousand/µL      Eosinophils Absolute 0.14 Thousand/µL      Basophils Absolute 0.06 Thousands/µL                    XR hand 3+ views LEFT    (Results Pending)   XR shoulder 2+ views LEFT    (Results Pending)              Procedures  Splint application    Date/Time: 5/21/2024 2:45 PM    Performed by: Jose Morris DO  Authorized by: Jose Morris DO    Pre-procedure details:     Sensation:  Normal    Skin color:  Left middle finger prefabricated splint applied.  Good cap refill pre and post application.  Procedure details:     Laterality:  Left    Strapping: no  Cast type:  Finger      Post-procedure details:     Sensation:  Normal    Patient tolerance of procedure:  Tolerated well, no immediate complications  Universal Protocol:  Consent: Verbal consent obtained.  Laceration repair    Date/Time: 5/21/2024 2:46 PM    Performed by: Jose Morris DO  Authorized by: Jose Morris DO  Body area: upper extremity  Location details: left upper arm  Laceration length: 4 cm  Foreign bodies: no foreign bodies  Tendon involvement: complex  Nerve involvement: superficial  Vascular damage: no  Anesthesia: digital block    Anesthesia:  Local Anesthetic: lidocaine 1% without epinephrine  Anesthetic total: 8 mL      Procedure Details:  Irrigation solution: saline  Skin closure: 4-0 nylon  Number of sutures: 8  Approximation difficulty: simple  Dressing: 4x4 sterile  gauze               ED Course             88-year-old male presents with several lacerations of left hand digits.  The middle finger had an obvious deformity.  X-ray was obtained.  Shows dislocation.  Reduced however extensor tendon laceration in the third digit of the left hand is evident on my exam.  Hand surgery was consulted.  Per Dr. Charles we can approximate the lacerations, splint and have the patient follow-up in his office.  Patient agreed with the plan.  IV antibiotics were administered in the ER.  Patient will be discharged home with prescription for Keflex and naproxen.  Return precautions provided.  Patient discharged with outpatient hand surgery follow-up.                                          Medical Decision Making  88-year-old male presents with several lacerations of left hand digits.  The middle finger had an obvious deformity.  X-ray was obtained.  Shows dislocation.  Reduced however extensor tendon laceration in the third digit of the left hand is evident on my exam.  Hand surgery was consulted.  Per Dr. Charles we can approximate the lacerations, splint and have the patient follow-up in his office.  Patient agreed with the plan.  IV antibiotics were administered in the ER.  Patient will be discharged home with prescription for Keflex and naproxen.  Return precautions provided.  Patient discharged with outpatient hand surgery follow-up.    Amount and/or Complexity of Data Reviewed  External Data Reviewed: radiology.  Labs: ordered.  Radiology: ordered.    Risk  OTC drugs.  Prescription drug management.             Disposition  Final diagnoses:   Extensor tendon laceration of hand with open wound   Finger fracture     Time reflects when diagnosis was documented in both MDM as applicable and the Disposition within this note       Time User Action Codes Description Comment    5/21/2024  1:52 PM Jose Morris Add [V52.917V,  P62.152A] Extensor tendon laceration of hand with open wound      5/21/2024  1:52 PM Jose Morris Add [S62.609A] Finger fracture           ED Disposition       ED Disposition   Discharge    Condition   Stable    Date/Time   Tue May 21, 2024  1:52 PM    Comment   Bay Real discharge to home/self care.                   Follow-up Information       Follow up With Specialties Details Why Contact Info    Haseeb Charles MD Orthopedic Surgery, Hand Surgery Schedule an appointment as soon as possible for a visit in 1 day  [address] [place] [address]  [address] Floor  [facility] [address] [phone]              Patient's Medications   Discharge Prescriptions    CEPHALEXIN (KEFLEX) 500 MG CAPSULE    Take 1 capsule (500 mg total) by mouth every 6 (six) hours for 7 days       Start Date: 5/21/2024 End Date: 5/28/2024       Order Dose: 500 mg       Quantity: 28 capsule    Refills: 0       No discharge procedures on file.    PDMP Review         Value Time User    PDMP Reviewed  Yes 9/3/2021  8:20 AM Meryl Garcia MD            ED Provider  Electronically Signed by             Jose Morris DO  05/21/24 3603

## 2024-05-21 NOTE — TELEPHONE ENCOUNTER
Hello,  Please advise if the following patient can be forced onto the schedule:    Patient: Bay Real    : 1935    MRN: [id number]    Call back #: [phone]    Insurance: [employer]    Reason for appointment: left hand fracture w/ tendon laceration    Requested doctor and/or location: hand specialist - per wife would like antonio because she doesn't  further that that location.      Thank you.

## 2024-05-23 ENCOUNTER — TELEPHONE (OUTPATIENT)
Age: 89
End: 2024-05-23

## 2024-05-23 ENCOUNTER — OFFICE VISIT (OUTPATIENT)
Dept: OBGYN CLINIC | Facility: CLINIC | Age: 89
End: 2024-05-23
Payer: COMMERCIAL

## 2024-05-23 ENCOUNTER — APPOINTMENT (OUTPATIENT)
Dept: RADIOLOGY | Facility: AMBULARY SURGERY CENTER | Age: 89
End: 2024-05-23
Attending: SURGERY
Payer: COMMERCIAL

## 2024-05-23 ENCOUNTER — APPOINTMENT (OUTPATIENT)
Dept: LAB | Facility: AMBULARY SURGERY CENTER | Age: 89
End: 2024-05-23
Payer: COMMERCIAL

## 2024-05-23 VITALS — BODY MASS INDEX: 28.44 KG/M2 | WEIGHT: 192 LBS | HEIGHT: 69 IN

## 2024-05-23 DIAGNOSIS — Z01.818 PRE-OP TESTING: ICD-10-CM

## 2024-05-23 DIAGNOSIS — S60.512A ABRASION OF LEFT HAND, INITIAL ENCOUNTER: ICD-10-CM

## 2024-05-23 DIAGNOSIS — M79.642 PAIN OF LEFT HAND: ICD-10-CM

## 2024-05-23 DIAGNOSIS — S63.259A DISLOCATION OF FINGER, INITIAL ENCOUNTER: Primary | ICD-10-CM

## 2024-05-23 DIAGNOSIS — Z01.812 PRE-PROCEDURE LAB EXAM: ICD-10-CM

## 2024-05-23 PROCEDURE — 73130 X-RAY EXAM OF HAND: CPT

## 2024-05-23 PROCEDURE — 99205 OFFICE O/P NEW HI 60 MIN: CPT | Performed by: SURGERY

## 2024-05-23 PROCEDURE — 93005 ELECTROCARDIOGRAM TRACING: CPT

## 2024-05-23 RX ORDER — SODIUM CHLORIDE 9 MG/ML
75 INJECTION, SOLUTION INTRAVENOUS CONTINUOUS
Status: CANCELLED | OUTPATIENT
Start: 2024-05-23

## 2024-05-23 RX ORDER — CHLORHEXIDINE GLUCONATE ORAL RINSE 1.2 MG/ML
15 SOLUTION DENTAL ONCE
Status: CANCELLED | OUTPATIENT
Start: 2024-05-23 | End: 2024-05-23

## 2024-05-23 NOTE — H&P
Alonzo Rodrigues M.D.  Attending, Orthopaedic Surgery  Hand, Wrist, and Elbow Surgery  [facility]      ORTHOPAEDIC HAND, WRIST, AND ELBOW OFFICE  VISIT       ASSESSMENT/PLAN:      88-year-old male with left hand abrasions and possible left long finger extensor tendon laceration, DOI 5/21/24    Discussed with the patient and his wife that he may have sustained a possible left long finger extensor tendon laceration. Surgical intervention was discussed in the form of left index, long, and ring finger exploration possible debridement. Risks of the surgery are inclusive of but not limited to bleeding, infection, nerve injury, blood clot, worsening of symptoms, not achieving the anticipated results, persistent stiffness, weakness and the need for additional surgery. The patient verbally stated they understood those risks and would like to proceed with the surgery. Surgical consent was signed in the office today. Discussed he will need to begin therapy early next week to work on motion. They will fabricate a custom splint for left long finger PIP extension splint to be fabricated to the volar aspect and to allow DIP and MP motion and a referral was placed for this today. He was instructed to perform dressing changes. He allow water to run over the wound but was instructed to avoid soaking or submerging the wounds. If he is at home in a controlled setting, he may leave the wounds open to air. I will see the patient the day of surgery.      The patient verbalized understanding of exam findings and treatment plan. We engaged in the shared decision-making process and treatment options were discussed at length with the patient. Surgical and conservative management discussed today along with risks and benefits.    Diagnoses and all orders for this visit:    Dislocation of finger, initial encounter  -     XR hand 3+ vw left; Future    Abrasion of left hand, initial encounter  -     Ambulatory Referral to  PT/OT Hand Therapy; Future        Follow Up:  Return for After surgery.    To Do Next Visit:  Sutures out      Operative Discussions:  Extensor Tendon Repair: The patient has elected to undergo operative repair of a lacerated extensor tendon.  During surgery, an incision will be made on the dorsal aspect of the hand or wrist which will be extended proximally to the level of tendon retraction.  The tendon will be delivered distally, and sutured to the remaining portion of the tendon.  Postoperatively, the splint will be applied in a protected position.  Postoperative physical therapy is a necessity to help improve outcomes.  The risks of the surgery include tendon rupture, stiffness, pain, and incomplete motion.The risks and benefits of the procedure were explained to the patient, which include, but are not limited to: Bleeding, infection, recurrence, pain, scar, damage to tendons, damage to nerves, and damage to blood vessels, failure to give desired results and complications related to anesthesia.  These risks, along with alternative conservative treatment options, and postoperative protocols were voiced back and understood by the patient.  All questions were answered to the patient's satisfaction.  The patient agrees to comply with a standard postoperative protocol, and is willing to proceed.  Education was provided via written and auditory forms.  There were no barriers to learning. Written handouts regarding wound care, incision and scar care, and general preoperative information was provided to the patient.  Prior to surgery, the patient may be requested to stop all anti-inflammatory medications.  Prophylactic aspirin, Plavix, and Coumadin may be allowed to be continued.  Medications including vitamin E., ginkgo, and fish oil are requested to be stopped approximately one week prior to surgery.  Hypertensive medications and beta blockers, if taken, should be  continued.      ____________________________________________________________________________________________________________________________________________      CHIEF COMPLAINT:  Chief Complaint   Patient presents with    Left Hand - Pain     He fell on Tuesday. Has stiches. Broken middle finger. Stiches on pointer, middle and ring finger       SUBJECTIVE:  Bay Real is a 88 y.o. year old RHD male who presents to the office today for evaluation of left hand lacerations. The patient had a fall in his driveway on 5/21/24 and caused lacerations to the dorsal PIP joints of his left index, long, and ring fingers. The patient presented to the ED after the injury where x-rays were taken. The patient had a long finger PIP dislocations that was reduced. The wounds were washed out and sutures were placed. He was discharged on Keflex which he has been compliant with. He has been taking Aleve as needed for pain.      Pain/symptom timing:  Worse during the day when active  Pain/symptom context:  Worse with activites and work  Pain/symptom modifying factors:  Rest makes better, activities make worse  Pain/symptom associated signs/symptoms: none    Prior treatment   NSAIDsYes   Injections No   Bracing/Orthotics Yes    Physical Therapy No     I have personally reviewed all the relevant PMH, PSH, SH, FH, Medications and allergies      PAST MEDICAL HISTORY:  Past Medical History:   Diagnosis Date    BPH with obstruction/lower urinary tract symptoms     Cataract     Diverticulitis     Diverticulosis     Dizziness     Ear problems     Elevated PSA     Hypercholesterolemia     Hypertension     Osteoarthritis     Poor urinary stream     Vertigo        PAST SURGICAL HISTORY:  Past Surgical History:   Procedure Laterality Date    CATARACT EXTRACTION Left     COLONOSCOPY  02/16/2004    KNEE SURGERY Left 2005    PROSTATE BIOPSY  2005    ROTATOR CUFF REPAIR Right 06/15/2015       FAMILY HISTORY:  Family History   Problem Relation Age  of Onset    Diabetes Mother     Heart disease Mother     Heart attack Mother     Cancer Father     Diabetes Father        SOCIAL HISTORY:  Social History     Tobacco Use    Smoking status: Former     Types: Cigarettes     Passive exposure: Past    Smokeless tobacco: Never   Vaping Use    Vaping status: Never Used   Substance Use Topics    Alcohol use: Yes     Alcohol/week: 14.0 standard drinks of alcohol     Types: 14 Cans of beer per week     Comment: 1-2 beers a day    Drug use: No       MEDICATIONS:    Current Outpatient Medications:     aspirin 81 MG tablet, Daily, Disp: , Rfl:     atorvastatin (LIPITOR) 40 mg tablet, TAKE 1 TABLET BY MOUTH EVERYDAY AT BEDTIME, Disp: 90 tablet, Rfl: 1    cephalexin (KEFLEX) 500 mg capsule, Take 1 capsule (500 mg total) by mouth every 6 (six) hours for 7 days, Disp: 28 capsule, Rfl: 0    Cholecalciferol (VITAMIN D3) 2000 units capsule, Daily, Disp: , Rfl:     cycloSPORINE (Restasis) 0.05 % ophthalmic emulsion, Restasis 0.05 % eye drops in a dropperette  PUT 1 DROP INTO BOTH EYES TWICE A DAY, Disp: , Rfl:     lisinopril (ZESTRIL) 10 mg tablet, TAKE 1 TABLET BY MOUTH EVERY DAY, Disp: 90 tablet, Rfl: 1    meclizine (ANTIVERT) 25 mg tablet, Take 1 tablet (25 mg total) by mouth 3 (three) times a day, Disp: 90 tablet, Rfl: 0    ALLERGIES:  No Known Allergies        REVIEW OF SYSTEMS:  Review of Systems    VITALS:  Vitals:       LABS:      _____________________________________________________  PHYSICAL EXAMINATION:  General: well developed and well nourished, alert, oriented times 3, and appears comfortable  Psychiatric: Normal  HEENT: Normocephalic, Atraumatic Trachea Midline, No torticollis  Pulmonary: No audible wheezing or respiratory distress   Abdomen/GI: Non tender, non distended   Cardiovascular: No pitting edema, 2+ radial pulse   Skin: No Masses, No Fluctuation, No Ulcerations  Neurovascular: Sensation Intact to the Median, Ulnar, Radial Nerve, Motor Intact to the Median,  Ulnar, Radial Nerve, and Pulses Intact  Musculoskeletal: Normal, except as noted in detailed exam and in HPI.      MUSCULOSKELETAL EXAMINATION:  Left hand  Dorsal lacerations across PIP joints of index, long, and ring fingers which are approximated with suture  No erythema or signs of infection  Maceration noted   Long finger sits slightly flexed at the PIP joint    ___________________________________________________  STUDIES REVIEWED:  I have personally reviewed and interpreted  AP lateral and oblique radiographs of left hand    which demonstrate reduced PIP dislocation       LABS REVIEWED:    HgA1c:   Lab Results   Component Value Date    HGBA1C 5.9 (H) 09/02/2021     BMP:   Lab Results   Component Value Date    CALCIUM 8.7 05/21/2024    K 4.2 05/21/2024    CO2 23 05/21/2024     05/21/2024    BUN 18 05/21/2024    CREATININE 1.07 05/21/2024               PROCEDURES PERFORMED:  Procedures  No Procedures performed today    _____________________________________________________      Scribe Attestation      I,:  Roselyn Tomas MA am acting as a scribe while in the presence of the attending physician.:       I,:  Alonzo Rodrigues MD personally performed the services described in this documentation    as scribed in my presence.:

## 2024-05-23 NOTE — H&P (VIEW-ONLY)
Alonzo Rodrigues M.D.  Attending, Orthopaedic Surgery  Hand, Wrist, and Elbow Surgery  [facility]      ORTHOPAEDIC HAND, WRIST, AND ELBOW OFFICE  VISIT       ASSESSMENT/PLAN:      88-year-old male with left hand abrasions and possible left long finger extensor tendon laceration, DOI 5/21/24    Discussed with the patient and his wife that he may have sustained a possible left long finger extensor tendon laceration. Surgical intervention was discussed in the form of left index, long, and ring finger exploration possible debridement. Risks of the surgery are inclusive of but not limited to bleeding, infection, nerve injury, blood clot, worsening of symptoms, not achieving the anticipated results, persistent stiffness, weakness and the need for additional surgery. The patient verbally stated they understood those risks and would like to proceed with the surgery. Surgical consent was signed in the office today. Discussed he will need to begin therapy early next week to work on motion. They will fabricate a custom splint for left long finger PIP extension splint to be fabricated to the volar aspect and to allow DIP and MP motion and a referral was placed for this today. He was instructed to perform dressing changes. He allow water to run over the wound but was instructed to avoid soaking or submerging the wounds. If he is at home in a controlled setting, he may leave the wounds open to air. I will see the patient the day of surgery.      The patient verbalized understanding of exam findings and treatment plan. We engaged in the shared decision-making process and treatment options were discussed at length with the patient. Surgical and conservative management discussed today along with risks and benefits.    Diagnoses and all orders for this visit:    Dislocation of finger, initial encounter  -     XR hand 3+ vw left; Future    Abrasion of left hand, initial encounter  -     Ambulatory Referral to  PT/OT Hand Therapy; Future        Follow Up:  Return for After surgery.    To Do Next Visit:  Sutures out      Operative Discussions:  Extensor Tendon Repair: The patient has elected to undergo operative repair of a lacerated extensor tendon.  During surgery, an incision will be made on the dorsal aspect of the hand or wrist which will be extended proximally to the level of tendon retraction.  The tendon will be delivered distally, and sutured to the remaining portion of the tendon.  Postoperatively, the splint will be applied in a protected position.  Postoperative physical therapy is a necessity to help improve outcomes.  The risks of the surgery include tendon rupture, stiffness, pain, and incomplete motion.The risks and benefits of the procedure were explained to the patient, which include, but are not limited to: Bleeding, infection, recurrence, pain, scar, damage to tendons, damage to nerves, and damage to blood vessels, failure to give desired results and complications related to anesthesia.  These risks, along with alternative conservative treatment options, and postoperative protocols were voiced back and understood by the patient.  All questions were answered to the patient's satisfaction.  The patient agrees to comply with a standard postoperative protocol, and is willing to proceed.  Education was provided via written and auditory forms.  There were no barriers to learning. Written handouts regarding wound care, incision and scar care, and general preoperative information was provided to the patient.  Prior to surgery, the patient may be requested to stop all anti-inflammatory medications.  Prophylactic aspirin, Plavix, and Coumadin may be allowed to be continued.  Medications including vitamin E., ginkgo, and fish oil are requested to be stopped approximately one week prior to surgery.  Hypertensive medications and beta blockers, if taken, should be  continued.      ____________________________________________________________________________________________________________________________________________      CHIEF COMPLAINT:  Chief Complaint   Patient presents with    Left Hand - Pain     He fell on Tuesday. Has stiches. Broken middle finger. Stiches on pointer, middle and ring finger       SUBJECTIVE:  Bay Real is a 88 y.o. year old RHD male who presents to the office today for evaluation of left hand lacerations. The patient had a fall in his driveway on 5/21/24 and caused lacerations to the dorsal PIP joints of his left index, long, and ring fingers. The patient presented to the ED after the injury where x-rays were taken. The patient had a long finger PIP dislocations that was reduced. The wounds were washed out and sutures were placed. He was discharged on Keflex which he has been compliant with. He has been taking Aleve as needed for pain.      Pain/symptom timing:  Worse during the day when active  Pain/symptom context:  Worse with activites and work  Pain/symptom modifying factors:  Rest makes better, activities make worse  Pain/symptom associated signs/symptoms: none    Prior treatment   NSAIDsYes   Injections No   Bracing/Orthotics Yes    Physical Therapy No     I have personally reviewed all the relevant PMH, PSH, SH, FH, Medications and allergies      PAST MEDICAL HISTORY:  Past Medical History:   Diagnosis Date    BPH with obstruction/lower urinary tract symptoms     Cataract     Diverticulitis     Diverticulosis     Dizziness     Ear problems     Elevated PSA     Hypercholesterolemia     Hypertension     Osteoarthritis     Poor urinary stream     Vertigo        PAST SURGICAL HISTORY:  Past Surgical History:   Procedure Laterality Date    CATARACT EXTRACTION Left     COLONOSCOPY  02/16/2004    KNEE SURGERY Left 2005    PROSTATE BIOPSY  2005    ROTATOR CUFF REPAIR Right 06/15/2015       FAMILY HISTORY:  Family History   Problem Relation Age  of Onset    Diabetes Mother     Heart disease Mother     Heart attack Mother     Cancer Father     Diabetes Father        SOCIAL HISTORY:  Social History     Tobacco Use    Smoking status: Former     Types: Cigarettes     Passive exposure: Past    Smokeless tobacco: Never   Vaping Use    Vaping status: Never Used   Substance Use Topics    Alcohol use: Yes     Alcohol/week: 14.0 standard drinks of alcohol     Types: 14 Cans of beer per week     Comment: 1-2 beers a day    Drug use: No       MEDICATIONS:    Current Outpatient Medications:     aspirin 81 MG tablet, Daily, Disp: , Rfl:     atorvastatin (LIPITOR) 40 mg tablet, TAKE 1 TABLET BY MOUTH EVERYDAY AT BEDTIME, Disp: 90 tablet, Rfl: 1    cephalexin (KEFLEX) 500 mg capsule, Take 1 capsule (500 mg total) by mouth every 6 (six) hours for 7 days, Disp: 28 capsule, Rfl: 0    Cholecalciferol (VITAMIN D3) 2000 units capsule, Daily, Disp: , Rfl:     cycloSPORINE (Restasis) 0.05 % ophthalmic emulsion, Restasis 0.05 % eye drops in a dropperette  PUT 1 DROP INTO BOTH EYES TWICE A DAY, Disp: , Rfl:     lisinopril (ZESTRIL) 10 mg tablet, TAKE 1 TABLET BY MOUTH EVERY DAY, Disp: 90 tablet, Rfl: 1    meclizine (ANTIVERT) 25 mg tablet, Take 1 tablet (25 mg total) by mouth 3 (three) times a day, Disp: 90 tablet, Rfl: 0    ALLERGIES:  No Known Allergies        REVIEW OF SYSTEMS:  Review of Systems    VITALS:  Vitals:       LABS:      _____________________________________________________  PHYSICAL EXAMINATION:  General: well developed and well nourished, alert, oriented times 3, and appears comfortable  Psychiatric: Normal  HEENT: Normocephalic, Atraumatic Trachea Midline, No torticollis  Pulmonary: No audible wheezing or respiratory distress   Abdomen/GI: Non tender, non distended   Cardiovascular: No pitting edema, 2+ radial pulse   Skin: No Masses, No Fluctuation, No Ulcerations  Neurovascular: Sensation Intact to the Median, Ulnar, Radial Nerve, Motor Intact to the Median,  Ulnar, Radial Nerve, and Pulses Intact  Musculoskeletal: Normal, except as noted in detailed exam and in HPI.      MUSCULOSKELETAL EXAMINATION:  Left hand  Dorsal lacerations across PIP joints of index, long, and ring fingers which are approximated with suture  No erythema or signs of infection  Maceration noted   Long finger sits slightly flexed at the PIP joint    ___________________________________________________  STUDIES REVIEWED:  I have personally reviewed and interpreted  AP lateral and oblique radiographs of left hand    which demonstrate reduced PIP dislocation       LABS REVIEWED:    HgA1c:   Lab Results   Component Value Date    HGBA1C 5.9 (H) 09/02/2021     BMP:   Lab Results   Component Value Date    CALCIUM 8.7 05/21/2024    K 4.2 05/21/2024    CO2 23 05/21/2024     05/21/2024    BUN 18 05/21/2024    CREATININE 1.07 05/21/2024               PROCEDURES PERFORMED:  Procedures  No Procedures performed today    _____________________________________________________      Scribe Attestation      I,:  Roselyn Tomas MA am acting as a scribe while in the presence of the attending physician.:       I,:  Alonzo Rodrigues MD personally performed the services described in this documentation    as scribed in my presence.:

## 2024-05-23 NOTE — PATIENT INSTRUCTIONS
What to Expect After Hand Surgery  Below are typical postoperative expectations and recommendations for our patients having hand/elbow surgery. Please understand, however, that every case and every patient are different so these recommendations are subject to change on an individual basis. Please be flexible if you are told something different on the day of surgery and understand that we do so with your best interest at heart.     Postoperative care:   Elevate your hand above your elbow for 24-48 hours after surgery to help with swelling.   Place your hand and arm over your head with motion at your shoulder three times a day.   Do NOT apply any cream/ointment/oil to your incisions including antibiotic ointment, peroxide, etc.   Do NOT soak your hands in standing water (dishwater, tubs, Jacuzzi's, pools, etc.) until given permission (typically 2-3 weeks after injury)   What to watch out for:   Increased numbness or tingling of your hand or fingers that is not relieved with elevation.   Increasing pain that is not controlled with medication.   Difficulty chewing, breathing, swallowing.   Chest pains or shortness of breath.   Fever over 101.4 degrees.   Bandages:   Please keep bandages clean and dry, you will need to cover bandages with plastic bag or cast bag when showering. Any sutures will be removed in the office, please do not try and remove these on your own. Any steri-strips will fall off on their own or we will remove them in the office as well.   For smaller procedures (carpal tunnel, trigger fingers, deQuervain's release, etc.) you will be able to remove the bandages 5-7 days from surgery. Once the bandages are removed you will be able to wash the hand and take short showers. Avoid soaking the wounds in any standing water (no dirty dishes, no pools/baths/hot tubs/ocean water, etc) until you are seen at your follow up visit.   For fractures, tendon repairs, and other larger procedures we will typically have  you keep the bandages on until we see you back in the office at your follow up visit. In some cases we may have you meet with occupational therapy before we see you back. If this is the case the therapist will remove your bandages for you and the above wound care instructions will apply.   We emphasize that you do not put anything on the surgical wounds including neosporin, lotions, oils, peroxide, etc. These can delay wound healing and open you up to infections. Bandaids are okay in some cases, but should only be in place for a short time as they can hold moisture in and break down the wound.   Motion and activity:   We encourage you to move any non-splinted fingers into a full tight fist as soon as possible after surgery unless otherwise specified by the surgical team. Hands get very stiff very quickly, so keep them moving!   Weight bearing status will depend on your surgery and will be specified in your postoperative instructions. Frequently we advise no lifting over 1-2 pounds with the operative hand, this allows you to perform activities of daily living (eating, brushing teeth/hair, etc), but also protects you from damaging the surgical site. In some cases we advise that you do not lift anything with the operative hand, but this will be specified in your instructions day of surgery.   Depending on your job and what surgery you had done some patients can return to work shortly after surgery. Typically if your job involves heavy lifting, griping, or manual labor we advise that you do not work until we see you back for your first follow up visit. These jobs carry a high risk of surgical site infections and wound healing complications  Sling:   Use of a sling will be specified in your postoperative instructions.   If you have a block done by anesthesia before surgery you will have limited use of the operative arm for around 24-48 hrs after and may require a sling to hold your arm up during that time. Once the block  wears off you may discontinue use of the sling.   Some surgeries do not require a block or a sling at all, this will be specified on day of surgery and in your postoperative instructions.   Pain medication:   We will prescribe you a one-time script of narcotic pain medications for postoperative pain, the amount will depend on what surgery was done. In addition to this we typically recommend Tylenol and Ibuprofen/naproxen for pain control, these medications work best when alternated every 3-4 hours.   Medications will vary between patients, so if you have any allergies or concerns please let us know and we can adjust our recommendations as needed.   Please let us know if you already take narcotic pain medicine regularly or if you are already established with pain management. Often times in these cases you may have signed a pain agreement with the prescribing provider and we will need to discuss with them regarding your postoperative care.   Follow-up:   Most follow up appointments are made when you schedule surgery, if you do not have a follow up by the time you schedule surgery please call the office to make an appointment. Typical follow up is 10-14 days from surgery unless otherwise specified.    Start therapy Tuesday/Wednesday next week

## 2024-05-23 NOTE — PROGRESS NOTES
Alonzo Rodrigues M.D.  Attending, Orthopaedic Surgery  Hand, Wrist, and Elbow Surgery  [facility]      ORTHOPAEDIC HAND, WRIST, AND ELBOW OFFICE  VISIT       ASSESSMENT/PLAN:      88-year-old male with left hand abrasions and possible left long finger extensor tendon laceration, DOI 5/21/24    Discussed with the patient and his wife that he may have sustained a possible left long finger extensor tendon laceration. Surgical intervention was discussed in the form of left index, long, and ring finger exploration possible debridement. Risks of the surgery are inclusive of but not limited to bleeding, infection, nerve injury, blood clot, worsening of symptoms, not achieving the anticipated results, persistent stiffness, weakness and the need for additional surgery. The patient verbally stated they understood those risks and would like to proceed with the surgery. Surgical consent was signed in the office today. Discussed he will need to begin therapy early next week to work on motion. They will fabricate a custom splint for left long finger PIP extension splint to be fabricated to the volar aspect and to allow DIP and MP motion and a referral was placed for this today. He was instructed to perform dressing changes. He allow water to run over the wound but was instructed to avoid soaking or submerging the wounds. If he is at home in a controlled setting, he may leave the wounds open to air. I will see the patient the day of surgery.      The patient verbalized understanding of exam findings and treatment plan. We engaged in the shared decision-making process and treatment options were discussed at length with the patient. Surgical and conservative management discussed today along with risks and benefits.    Diagnoses and all orders for this visit:    Dislocation of finger, initial encounter  -     XR hand 3+ vw left; Future    Abrasion of left hand, initial encounter  -     Ambulatory Referral to  PT/OT Hand Therapy; Future        Follow Up:  Return for After surgery.    To Do Next Visit:  Sutures out      Operative Discussions:  Extensor Tendon Repair: The patient has elected to undergo operative repair of a lacerated extensor tendon.  During surgery, an incision will be made on the dorsal aspect of the hand or wrist which will be extended proximally to the level of tendon retraction.  The tendon will be delivered distally, and sutured to the remaining portion of the tendon.  Postoperatively, the splint will be applied in a protected position.  Postoperative physical therapy is a necessity to help improve outcomes.  The risks of the surgery include tendon rupture, stiffness, pain, and incomplete motion.The risks and benefits of the procedure were explained to the patient, which include, but are not limited to: Bleeding, infection, recurrence, pain, scar, damage to tendons, damage to nerves, and damage to blood vessels, failure to give desired results and complications related to anesthesia.  These risks, along with alternative conservative treatment options, and postoperative protocols were voiced back and understood by the patient.  All questions were answered to the patient's satisfaction.  The patient agrees to comply with a standard postoperative protocol, and is willing to proceed.  Education was provided via written and auditory forms.  There were no barriers to learning. Written handouts regarding wound care, incision and scar care, and general preoperative information was provided to the patient.  Prior to surgery, the patient may be requested to stop all anti-inflammatory medications.  Prophylactic aspirin, Plavix, and Coumadin may be allowed to be continued.  Medications including vitamin E., ginkgo, and fish oil are requested to be stopped approximately one week prior to surgery.  Hypertensive medications and beta blockers, if taken, should be  continued.      ____________________________________________________________________________________________________________________________________________      CHIEF COMPLAINT:  Chief Complaint   Patient presents with    Left Hand - Pain     He fell on Tuesday. Has stiches. Broken middle finger. Stiches on pointer, middle and ring finger       SUBJECTIVE:  Bay Real is a 88 y.o. year old RHD male who presents to the office today for evaluation of left hand lacerations. The patient had a fall in his driveway on 5/21/24 and caused lacerations to the dorsal PIP joints of his left index, long, and ring fingers. The patient presented to the ED after the injury where x-rays were taken. The patient had a long finger PIP dislocations that was reduced. The wounds were washed out and sutures were placed. He was discharged on Keflex which he has been compliant with. He has been taking Aleve as needed for pain.      Pain/symptom timing:  Worse during the day when active  Pain/symptom context:  Worse with activites and work  Pain/symptom modifying factors:  Rest makes better, activities make worse  Pain/symptom associated signs/symptoms: none    Prior treatment   NSAIDsYes   Injections No   Bracing/Orthotics Yes    Physical Therapy No     I have personally reviewed all the relevant PMH, PSH, SH, FH, Medications and allergies      PAST MEDICAL HISTORY:  Past Medical History:   Diagnosis Date    BPH with obstruction/lower urinary tract symptoms     Cataract     Diverticulitis     Diverticulosis     Dizziness     Ear problems     Elevated PSA     Hypercholesterolemia     Hypertension     Osteoarthritis     Poor urinary stream     Vertigo        PAST SURGICAL HISTORY:  Past Surgical History:   Procedure Laterality Date    CATARACT EXTRACTION Left     COLONOSCOPY  02/16/2004    KNEE SURGERY Left 2005    PROSTATE BIOPSY  2005    ROTATOR CUFF REPAIR Right 06/15/2015       FAMILY HISTORY:  Family History   Problem Relation Age  of Onset    Diabetes Mother     Heart disease Mother     Heart attack Mother     Cancer Father     Diabetes Father        SOCIAL HISTORY:  Social History     Tobacco Use    Smoking status: Former     Types: Cigarettes     Passive exposure: Past    Smokeless tobacco: Never   Vaping Use    Vaping status: Never Used   Substance Use Topics    Alcohol use: Yes     Alcohol/week: 14.0 standard drinks of alcohol     Types: 14 Cans of beer per week     Comment: 1-2 beers a day    Drug use: No       MEDICATIONS:    Current Outpatient Medications:     aspirin 81 MG tablet, Daily, Disp: , Rfl:     atorvastatin (LIPITOR) 40 mg tablet, TAKE 1 TABLET BY MOUTH EVERYDAY AT BEDTIME, Disp: 90 tablet, Rfl: 1    cephalexin (KEFLEX) 500 mg capsule, Take 1 capsule (500 mg total) by mouth every 6 (six) hours for 7 days, Disp: 28 capsule, Rfl: 0    Cholecalciferol (VITAMIN D3) 2000 units capsule, Daily, Disp: , Rfl:     cycloSPORINE (Restasis) 0.05 % ophthalmic emulsion, Restasis 0.05 % eye drops in a dropperette  PUT 1 DROP INTO BOTH EYES TWICE A DAY, Disp: , Rfl:     lisinopril (ZESTRIL) 10 mg tablet, TAKE 1 TABLET BY MOUTH EVERY DAY, Disp: 90 tablet, Rfl: 1    meclizine (ANTIVERT) 25 mg tablet, Take 1 tablet (25 mg total) by mouth 3 (three) times a day, Disp: 90 tablet, Rfl: 0    ALLERGIES:  No Known Allergies        REVIEW OF SYSTEMS:  Review of Systems    VITALS:  Vitals:       LABS:      _____________________________________________________  PHYSICAL EXAMINATION:  General: well developed and well nourished, alert, oriented times 3, and appears comfortable  Psychiatric: Normal  HEENT: Normocephalic, Atraumatic Trachea Midline, No torticollis  Pulmonary: No audible wheezing or respiratory distress   Abdomen/GI: Non tender, non distended   Cardiovascular: No pitting edema, 2+ radial pulse   Skin: No Masses, No Fluctuation, No Ulcerations  Neurovascular: Sensation Intact to the Median, Ulnar, Radial Nerve, Motor Intact to the Median,  Ulnar, Radial Nerve, and Pulses Intact  Musculoskeletal: Normal, except as noted in detailed exam and in HPI.      MUSCULOSKELETAL EXAMINATION:  Left hand  Dorsal lacerations across PIP joints of index, long, and ring fingers which are approximated with suture  No erythema or signs of infection  Maceration noted   Long finger sits slightly flexed at the PIP joint    ___________________________________________________  STUDIES REVIEWED:  I have personally reviewed and interpreted  AP lateral and oblique radiographs of left hand    which demonstrate reduced PIP dislocation       LABS REVIEWED:    HgA1c:   Lab Results   Component Value Date    HGBA1C 5.9 (H) 09/02/2021     BMP:   Lab Results   Component Value Date    CALCIUM 8.7 05/21/2024    K 4.2 05/21/2024    CO2 23 05/21/2024     05/21/2024    BUN 18 05/21/2024    CREATININE 1.07 05/21/2024               PROCEDURES PERFORMED:  Procedures  No Procedures performed today    _____________________________________________________      Scribe Attestation      I,:  Roselyn Tomas MA am acting as a scribe while in the presence of the attending physician.:       I,:  Alonzo Rodrigues MD personally performed the services described in this documentation    as scribed in my presence.:

## 2024-05-23 NOTE — TELEPHONE ENCOUNTER
Caller: Jessica - Patient Spouse    Doctor: Ravi    Reason for call: Calling to double check location of surgery and make sure we have a good phone number for patient, as their home power is out and their landline won't work until it returns.    Confirmed phone number and surgery locale, and provided procedure unit number in case they don't hear from them.    Call back#: N/A

## 2024-05-24 ENCOUNTER — HOSPITAL ENCOUNTER (OUTPATIENT)
Age: 89
Setting detail: OUTPATIENT SURGERY
Discharge: HOME | End: 2024-05-24
Attending: SURGERY | Admitting: SURGERY
Payer: COMMERCIAL

## 2024-05-24 ENCOUNTER — APPOINTMENT (OUTPATIENT)
Age: 89
End: 2024-05-24
Payer: COMMERCIAL

## 2024-05-24 ENCOUNTER — ANESTHESIA (OUTPATIENT)
Age: 89
End: 2024-05-24
Payer: COMMERCIAL

## 2024-05-24 ENCOUNTER — ANESTHESIA EVENT (OUTPATIENT)
Age: 89
End: 2024-05-24
Payer: COMMERCIAL

## 2024-05-24 VITALS
SYSTOLIC BLOOD PRESSURE: 153 MMHG | BODY MASS INDEX: 28.02 KG/M2 | WEIGHT: 189.2 LBS | DIASTOLIC BLOOD PRESSURE: 70 MMHG | HEIGHT: 69 IN | RESPIRATION RATE: 16 BRPM | TEMPERATURE: 97.8 F | OXYGEN SATURATION: 97 % | HEART RATE: 66 BPM

## 2024-05-24 DIAGNOSIS — S66.902A INJURY OF EXTENSOR TENDON OF LEFT HAND, INITIAL ENCOUNTER: Primary | ICD-10-CM

## 2024-05-24 PROCEDURE — 11011 DEBRIDE SKIN MUSC AT FX SITE: CPT | Performed by: SURGERY

## 2024-05-24 PROCEDURE — 11011 DEBRIDE SKIN MUSC AT FX SITE: CPT | Performed by: PHYSICIAN ASSISTANT

## 2024-05-24 PROCEDURE — 73120 X-RAY EXAM OF HAND: CPT

## 2024-05-24 PROCEDURE — 26418 REPAIR FINGER TENDON: CPT | Performed by: SURGERY

## 2024-05-24 PROCEDURE — 26418 REPAIR FINGER TENDON: CPT | Performed by: PHYSICIAN ASSISTANT

## 2024-05-24 RX ORDER — PROPOFOL 10 MG/ML
INJECTION, EMULSION INTRAVENOUS CONTINUOUS PRN
Status: DISCONTINUED | OUTPATIENT
Start: 2024-05-24 | End: 2024-05-24

## 2024-05-24 RX ORDER — HYDROCODONE BITARTRATE AND ACETAMINOPHEN 5; 325 MG/1; MG/1
1 TABLET ORAL ONCE AS NEEDED
Status: CANCELLED | OUTPATIENT
Start: 2024-05-24

## 2024-05-24 RX ORDER — HYDROCODONE BITARTRATE AND ACETAMINOPHEN 5; 325 MG/1; MG/1
1 TABLET ORAL EVERY 6 HOURS PRN
Qty: 5 TABLET | Refills: 0 | Status: SHIPPED | OUTPATIENT
Start: 2024-05-24

## 2024-05-24 RX ORDER — PROPOFOL 10 MG/ML
INJECTION, EMULSION INTRAVENOUS AS NEEDED
Status: DISCONTINUED | OUTPATIENT
Start: 2024-05-24 | End: 2024-05-24

## 2024-05-24 RX ORDER — CHLORHEXIDINE GLUCONATE ORAL RINSE 1.2 MG/ML
15 SOLUTION DENTAL ONCE
Status: COMPLETED | OUTPATIENT
Start: 2024-05-24 | End: 2024-05-24

## 2024-05-24 RX ORDER — SODIUM CHLORIDE 9 MG/ML
75 INJECTION, SOLUTION INTRAVENOUS CONTINUOUS
Status: DISCONTINUED | OUTPATIENT
Start: 2024-05-24 | End: 2024-05-24 | Stop reason: HOSPADM

## 2024-05-24 RX ORDER — MAGNESIUM HYDROXIDE 1200 MG/15ML
LIQUID ORAL AS NEEDED
Status: DISCONTINUED | OUTPATIENT
Start: 2024-05-24 | End: 2024-05-24 | Stop reason: HOSPADM

## 2024-05-24 RX ORDER — ONDANSETRON 2 MG/ML
4 INJECTION INTRAMUSCULAR; INTRAVENOUS ONCE AS NEEDED
Status: DISCONTINUED | OUTPATIENT
Start: 2024-05-24 | End: 2024-05-24 | Stop reason: HOSPADM

## 2024-05-24 RX ORDER — LIDOCAINE HYDROCHLORIDE 10 MG/ML
INJECTION, SOLUTION EPIDURAL; INFILTRATION; INTRACAUDAL; PERINEURAL AS NEEDED
Status: DISCONTINUED | OUTPATIENT
Start: 2024-05-24 | End: 2024-05-24

## 2024-05-24 RX ORDER — CEFAZOLIN SODIUM 2 G/50ML
2000 SOLUTION INTRAVENOUS ONCE
Status: COMPLETED | OUTPATIENT
Start: 2024-05-24 | End: 2024-05-24

## 2024-05-24 RX ORDER — PHENYLEPHRINE HCL IN 0.9% NACL 1 MG/10 ML
SYRINGE (ML) INTRAVENOUS AS NEEDED
Status: DISCONTINUED | OUTPATIENT
Start: 2024-05-24 | End: 2024-05-24

## 2024-05-24 RX ORDER — FENTANYL CITRATE/PF 50 MCG/ML
12.5 SYRINGE (ML) INJECTION
Status: DISCONTINUED | OUTPATIENT
Start: 2024-05-24 | End: 2024-05-24 | Stop reason: HOSPADM

## 2024-05-24 RX ADMIN — SODIUM CHLORIDE 75 ML/HR: 0.9 INJECTION, SOLUTION INTRAVENOUS at 11:21

## 2024-05-24 RX ADMIN — Medication 100 MCG: at 12:55

## 2024-05-24 RX ADMIN — Medication 150 MCG: at 13:22

## 2024-05-24 RX ADMIN — Medication 150 MCG: at 13:13

## 2024-05-24 RX ADMIN — Medication 100 MCG: at 14:04

## 2024-05-24 RX ADMIN — Medication 150 MCG: at 13:07

## 2024-05-24 RX ADMIN — Medication 100 MCG: at 13:01

## 2024-05-24 RX ADMIN — PROPOFOL 50 MG: 10 INJECTION, EMULSION INTRAVENOUS at 12:44

## 2024-05-24 RX ADMIN — PROPOFOL 80 MCG/KG/MIN: 10 INJECTION, EMULSION INTRAVENOUS at 12:46

## 2024-05-24 RX ADMIN — CHLORHEXIDINE GLUCONATE 15 ML: 1.2 RINSE ORAL at 11:21

## 2024-05-24 RX ADMIN — SODIUM CHLORIDE: 0.9 INJECTION, SOLUTION INTRAVENOUS at 13:50

## 2024-05-24 RX ADMIN — Medication 100 MCG: at 12:53

## 2024-05-24 RX ADMIN — PROPOFOL 50 MG: 10 INJECTION, EMULSION INTRAVENOUS at 12:41

## 2024-05-24 RX ADMIN — LIDOCAINE HYDROCHLORIDE 50 MG: 10 INJECTION, SOLUTION EPIDURAL; INFILTRATION; INTRACAUDAL; PERINEURAL at 12:41

## 2024-05-24 RX ADMIN — Medication 150 MCG: at 13:50

## 2024-05-24 RX ADMIN — CEFAZOLIN SODIUM 2000 MG: 2 SOLUTION INTRAVENOUS at 12:37

## 2024-05-24 RX ADMIN — PROPOFOL 50 MG: 10 INJECTION, EMULSION INTRAVENOUS at 12:46

## 2024-05-24 RX ADMIN — Medication 100 MCG: at 12:48

## 2024-05-24 NOTE — ANESTHESIA PREPROCEDURE EVALUATION
Procedure:  REPAIR TENDON FINGER/HAND (Left: Finger)    Relevant Problems   CARDIO   (+) Essential hypertension   (+) Mixed hyperlipidemia      /RENAL   (+) Stage 3a chronic kidney disease (CKD) (HCC)      Lab Results   Component Value Date    WBC 8.16 05/21/2024    HGB 13.9 05/21/2024    HCT 41.7 05/21/2024    MCV 93 05/21/2024     05/21/2024       Physical Exam    Airway    Mallampati score: I  TM Distance: >3 FB  Neck ROM: full     Dental   No notable dental hx     Cardiovascular  Rhythm: regular, Rate: normal    Pulmonary   Breath sounds clear to auscultation    Other Findings  Intercisor Distance > 3cm          Anesthesia Plan  ASA Score- 3     Anesthesia Type- IV sedation with anesthesia with ASA Monitors.         Additional Monitors:     Airway Plan:     Comment: NPO appropriate. Discussed benefits/risks of monitored anesthetic care which involves providing a dynamic level of mild to deep sedation. Complications include awareness and/or airway obstruction/aspiration which may necessitate conversion to general anesthesia. All questions answered. Patient understands and wishes to proceed.     Discussed nerve block to assist with post-operative analgesia. Discussed possibility of uncommon complications including permanent nerve injury, damage to blood vessels, infection local anesthetic toxicity, and nerve block failure. Patient understands and wishes to proceed.     .       Plan Factors-Exercise tolerance (METS): >4 METS.    Chart reviewed. EKG reviewed.  Existing labs reviewed.                   Induction-     Postoperative Plan- Plan for postoperative opioid use.         Informed Consent- Anesthetic plan and risks discussed with patient.  I personally reviewed this patient with the CRNA. Discussed and agreed on the Anesthesia Plan with the CRNA..

## 2024-05-24 NOTE — DISCHARGE INSTR - AVS FIRST PAGE
Post Operative Instructions    You have had surgery on your arm today, please read and follow the information below:  Elevate your hand above your elbow during the next 24-48 hours to help with swelling.  Place your hand and arm over your head with motion at your shoulder three times a day.  Do not apply any cream/ointment/oil to your incisions including antibiotics.  Do not soak your hands in standing water (dishwater, tubs, Jacuzzi's, pools, etc.) until given permission (typically 2-3 weeks after injury)    Call the office if you notice any:  Increased numbness or tingling of your hand or fingers that is not relieved with elevation.  Increasing pain that is not controlled with medication.  Difficulty chewing, breathing, swallowing.  Chest pains or shortness of breath.  Fever over 101.4 degrees.    Bandage: Please keep bandages clean and dry. Your therapist will remove your bandage at your first therapy appointment.    Please do NOT put any topical agents on the surgical wound including neosporin, peroxide, tea tree oil, vitamin E, etc. as these can delay wound healing.    Motion: Move fingers into a fist 5 times a day, DO NOT move any splinted fingers.    Weight bearing status: The operated extremity should be non-weight bearing until further notice.    Ice: Ice for 10 minutes every hour as needed for swelling x 24 hours.    Sling: Please use your sling as needed for comfort. While using the sling, make sure to move your shoulder throughout the day to prevent stiffness here.     Pain medication:   Naproxen 220 mg two times a day (this is over the counter!)  *do not take this medication if you were told by your doctor that you cannot take anti-inflammatories or NSAIDS  Tylenol Extended Release 650 mg every 8 hours (this is over the counter!)   Norco/Hydrocodone one tab every 6 hours ONLY AS NEEDED for severe pain        Follow-up Appointment: 10-14 days with Dr Rodrigues        Please call the office if you have any  questions or concerns regarding your post-operative care.

## 2024-05-24 NOTE — ANESTHESIA POSTPROCEDURE EVALUATION
Post-Op Assessment Note    CV Status:  Stable  Pain Score: 0    Pain management: adequate       Mental Status:  Sleepy and arousable   Hydration Status:  Euvolemic   PONV Controlled:  Controlled   Airway Patency:  Patent     Post Op Vitals Reviewed: Yes    No anethesia notable event occurred.    Staff: CRNA               BP   116/56   Temp   97.6   Pulse 66   Resp 12   SpO2   100

## 2024-05-28 LAB
ATRIAL RATE: 60 BPM
QRS AXIS: -28 DEGREES
QRSD INTERVAL: 78 MS
QT INTERVAL: 374 MS
QTC INTERVAL: 374 MS
T WAVE AXIS: 53 DEGREES
VENTRICULAR RATE: 60 BPM

## 2024-05-28 PROCEDURE — 93010 ELECTROCARDIOGRAM REPORT: CPT | Performed by: INTERNAL MEDICINE

## 2024-05-29 ENCOUNTER — EVALUATION (OUTPATIENT)
Dept: OCCUPATIONAL THERAPY | Facility: CLINIC | Age: 89
End: 2024-05-29
Payer: COMMERCIAL

## 2024-05-29 DIAGNOSIS — S60.512A ABRASION OF LEFT HAND, INITIAL ENCOUNTER: Primary | ICD-10-CM

## 2024-05-29 DIAGNOSIS — Z98.890 STATUS POST SURGERY: ICD-10-CM

## 2024-05-29 PROCEDURE — 97166 OT EVAL MOD COMPLEX 45 MIN: CPT

## 2024-05-29 PROCEDURE — 97110 THERAPEUTIC EXERCISES: CPT

## 2024-05-29 NOTE — PROGRESS NOTES
OT Evaluation     Today's date: 2024  Patient name: Bay Real  : 1935  MRN: [id number]  Referring provider: Alonzo Rodrigues MD  Dx:   Encounter Diagnosis     ICD-10-CM    1. Abrasion of left hand, initial encounter  S60.512A Ambulatory Referral to PT/OT Hand Therapy      2. Status post surgery  Z98.890                      Assessment  Impairments: abnormal coordination, abnormal or restricted ROM, activity intolerance, impaired physical strength and pain with function  Symptom irritability: moderate    Assessment details: Patient presenting to skilled OP OT hand therapy services s/p I&D of traumatic arthotomy of the left ring finger with partial tendon repair, I&D of left index finger with partial extensor tendon repair; I&D of left long finger proximal phalax fracture & traumatic arthotomy with extensor tendon reconstruction. Patient surgery took place on 24. Patient initial injury occurred on 24 as the result of mechanical fall which occurred when pushing a heavy garbage can. Patient presented to ED with lacerations to multiple digits and a deformity of the PIP joint of the LF. X-rays displayed a PIP joint dislocation. Upon examination the extensor tendon of the LF was lacerated. Further extensor tendon injuries were discovered during surgical examination and subsequently repaired. Patient has a follow up with orthopedics on 24. Prior to measurements and evaluation patient was instructed on precautions. Patient was cleared by orthopedics for gentle motion of the entirety of the RF and IF. The LF DIP and MCP joints are cleared for gentle motion but their PIP joint is to be immobilized in a volar splint at the PIP joint until their follow up with orthopedics. Therapist removed post operative dressing and fabricated a volar PIP blocking splint for the MF. Prior to patient leaving therapist re-applied sterile dressing and instructed patient on how to apply new following hygiene  over the next few days. Patient digit ROM is decreased as anticipated but is within the outlined protocol. Edema measurements deferred secondary to lacerations but is visibly swollen in the dorsal hand and all digits. Pain is reported to be mild to moderate in intensity. Patient was provided a custom HEP consisting of short arc digit AROM and instructed on how to complete it. See below for a more detailed assessment.   Understanding of Dx/Px/POC: good     Prognosis: good    Goals  STG:    Patient will display increased PIP flexion by >10 degrees each week over the next 4 weeks    Patient incisions will fully heal and sutures will be removed at subsequent orthopedics appointment    Patient edema will decrease by 1-3 mm at the P1's of 2-4th digits in 4 weeks    Patient will report compliance to established HEP all all subsequent visits    LTG:    Patient will display digit AROM WFL in 8 weeks or discharge    Patient edema will be resolved in the entire hand in 8 weeks or discharge            Plan  Patient would benefit from: custom splinting, skilled occupational therapy and OT eval  Referral necessary: No  Planned modality interventions: ultrasound, thermotherapy: hydrocollator packs and unattended electrical stimulation    Planned therapy interventions: IADL retraining, patient education, self care, manual therapy, orthotic fitting/training, strengthening, stretching, therapeutic activities, therapeutic exercise, home exercise program, functional ROM exercises and fine motor coordination training    Frequency: 2x week  Duration in visits: 10  Duration in weeks: 10  Plan of Care beginning date: 5/29/2024  Plan of Care expiration date: 7/29/2024  Treatment plan discussed with: patient  Plan details: Patient would benefit from skilled occupational therapy services 2 times per week for 8 weeks to return to prior level of function and achieve all of their established goals. Thank you for the referral.         Subjective  "Evaluation    History of Present Illness  Mechanism of injury: surgery and trauma  Mechanism of injury: Subjective:  \"I'm concerned because I can't use the railing to enter the bi-level\". I'm dedicated to the exercises\". \"I exercise all the time\". \"I have had 2 falls, I have vision impairments that effect my balance\". \"They don't hurt too bad, other than when I try to move them\".           Recurrent probem    Quality of life: good    Patient Goals  Patient goals for therapy: decreased edema, decreased pain, increased motion and increased strength    Pain  Current pain ratin  At best pain ratin  At worst pain ratin  Location: L dorsal hand, 2nd, 3rd, and 4th digit  Quality: sharp  Relieving factors: rest, relaxation and support  Exacerbated by: General motion.  Progression: improved    Social Support    Employment status: not working  Hand dominance: right      Diagnostic Tests  X-ray: abnormal  Treatments  Previous treatment: immobilization  Current treatment: immobilization and occupational therapy      Objective     Observations     Right Wrist/Hand   Positive for abrasion, edema and incision.     Additional Observation Details  Abrasions on dorsal hand, and digits 1-4.    Incisions over PIP joints of 2-4th digit    Neurological Testing     Sensation     Wrist/Hand   Left   Intact: light touch    Right   Intact: light touch    Active Range of Motion     Right Digits   Flexion   Index     MCP: 56    PIP: 32    DIP: 16  Middle     MCP: 38  Ring     MCP: 30    PIP: 34    DIP: 12  Extension   Index     PIP: -14  Ring     PIP: -12    Strength/Myotome Testing     Additional Strength Details  Deferred secondary to healing timeline. Will access as able    Swelling     Right Wrist/Hand     Additional Swelling Details  Deferred edema measurements secondary to healing incisions and abrasions. Will access as able.       Flowsheet Rows      Flowsheet Row Most Recent Value   PT/OT G-Codes    Current Score 4 "   Projected Score 51               Precautions: S/P I&D of traumatic arthotomy of the left ring finger with partial tendon repair; I&D of left index finger with partial extensor tendon repair; I&D of left long finger proximal phalax fracture & traumatic arthotomy with extensor tendon reconstruction    LF Extensor Tendon Reconstruction and Fracture  -PIP Splinted in volar splint no PIP motion due to tendon repair and dislocation until cleared by orthopedics     IF and RF Tendon Repair (Partial Tear)  - cleared for gentle AROM  - Begin with short arc motion to avoid extensor lag  - 5/29/24 PIP Flexion to 45 degrees progressing 10 degrees a week (With consideration to extensor lag)      Manuals 5/29            MEM (once sutures are removed)             Scar massage (once sutures are removed)                                       Neuro Re-Ed                                                                                                        Ther Ex             HEP Short arc motion to 45 degree PIP flexion            Short arc motion                                                                                           Ther Activity                                                                              Modalities

## 2024-05-31 ENCOUNTER — OFFICE VISIT (OUTPATIENT)
Dept: OCCUPATIONAL THERAPY | Facility: CLINIC | Age: 89
End: 2024-05-31
Payer: COMMERCIAL

## 2024-05-31 DIAGNOSIS — Z98.890 STATUS POST SURGERY: ICD-10-CM

## 2024-05-31 DIAGNOSIS — S60.512A ABRASION OF LEFT HAND, INITIAL ENCOUNTER: Primary | ICD-10-CM

## 2024-05-31 PROCEDURE — 97110 THERAPEUTIC EXERCISES: CPT

## 2024-05-31 PROCEDURE — 97140 MANUAL THERAPY 1/> REGIONS: CPT

## 2024-05-31 NOTE — PROGRESS NOTES
"Daily Note     Today's date: 2024  Patient name: Bay Real  : 1935  MRN: [id number]  Referring provider: Alonzo Rodrigues MD  Dx:   Encounter Diagnosis     ICD-10-CM    1. Abrasion of left hand, initial encounter  S60.512A       2. Status post surgery  Z98.890                      Subjective: \"we have really been watching it\". I am very motivated\".       Objective: See treatment diary below      Assessment: Tolerated treatment well. Patient reported compliance to HEP. Bandages removed and Incisions and abrasions were inspected and found to be clean, sutures intact, no sign of infection. Patient has been completing bandage changes as instructed. Patient tolerated short arc AROM will. Motion is full in allowed ranges. Will continue to progress by 10 degrees each week relative to extension lag.       Plan: Continue per plan of care.  Progress treatment as tolerated.       Precautions: S/P I&D of traumatic arthotomy of the left ring finger with partial tendon repair; I&D of left index finger with partial extensor tendon repair; I&D of left long finger proximal phalax fracture & traumatic arthotomy with extensor tendon reconstruction    LF Extensor Tendon Reconstruction and Fracture  -PIP Splinted in volar splint no PIP motion due to tendon repair and dislocation until cleared by orthopedics     IF and RF Tendon Repair (Partial Tear)  - cleared for gentle AROM  - Begin with short arc motion to avoid extensor lag  - 24 PIP Flexion to 45 degrees progressing 10 degrees a week (With consideration to extensor lag)      Manuals            MEM (once sutures are removed)             Scar massage (once sutures are removed)             Wound care  8'                        Neuro Re-Ed                                                                                                        Ther Ex             HEP Short arc motion to 45 degree PIP flexion            Short arc motion  45 degrees in " IF and RF flexion to full extension           MF DIP and MCP motion  8' completed in splint to prevent PIP motion                                                                            Ther Activity                                                                              Modalities

## 2024-06-04 ENCOUNTER — OFFICE VISIT (OUTPATIENT)
Dept: OCCUPATIONAL THERAPY | Facility: CLINIC | Age: 89
End: 2024-06-04
Payer: COMMERCIAL

## 2024-06-04 DIAGNOSIS — S60.512A ABRASION OF LEFT HAND, INITIAL ENCOUNTER: ICD-10-CM

## 2024-06-04 DIAGNOSIS — Z98.890 STATUS POST SURGERY: Primary | ICD-10-CM

## 2024-06-04 PROCEDURE — 97110 THERAPEUTIC EXERCISES: CPT

## 2024-06-04 PROCEDURE — 97140 MANUAL THERAPY 1/> REGIONS: CPT

## 2024-06-04 NOTE — PROGRESS NOTES
Daily Note     Today's date: 2024  Patient name: Bay Real  : 1935  MRN: [id number]  Referring provider: Alonzo Rodrigues MD  Dx:   Encounter Diagnosis     ICD-10-CM    1. Status post surgery  Z98.890       2. Abrasion of left hand, initial encounter  S60.512A                      Subjective: It's hard to bend it that far (referring to IF & RF PIP flexion).      Objective: See treatment diary below      Assessment: Tolerated treatment well. Increased IF & RF PIP flexion to 55d. Made exercise splint for pt to use to 55d. DIP jts of all fingers minimal. He does have pain with PIP flexion. No ext lag noted with exercises. Mild-mod swelling of involved fingers.      Plan: Continue per plan of care.  Progress treatment as tolerated.       Precautions: S/P I&D of traumatic arthotomy of the left ring finger with partial tendon repair; I&D of left index finger with partial extensor tendon repair; I&D of left long finger proximal phalax fracture & traumatic arthotomy with extensor tendon reconstruction    LF Extensor Tendon Reconstruction and Fracture  -PIP Splinted in volar splint no PIP motion due to tendon repair and dislocation until cleared by orthopedics     IF and RF Tendon Repair (Partial Tear)  - cleared for gentle AROM  - Begin with short arc motion to avoid extensor lag  - 24 PIP Flexion to 45 degrees progressing 10 degrees a week (With consideration to extensor lag)      Manuals           MEM (once sutures are removed)   8'          Scar massage (once sutures are removed)             Wound care  8'                        Neuro Re-Ed                                                                                                        Ther Ex             HEP Short arc motion to 45 degree PIP flexion  55d (use ex splint) 10x, 4x/day          Short arc motion  45 degrees in IF and RF flexion to full extension 55d into splint, full ext          MF DIP and MCP motion  8'  completed in splint to prevent PIP motion 8'                                                                           Ther Activity                                                                              Modalities

## 2024-06-05 ENCOUNTER — OFFICE VISIT (OUTPATIENT)
Dept: OBGYN CLINIC | Facility: CLINIC | Age: 89
End: 2024-06-05

## 2024-06-05 VITALS — WEIGHT: 189.2 LBS | BODY MASS INDEX: 28.02 KG/M2 | HEIGHT: 69 IN

## 2024-06-05 DIAGNOSIS — S63.259A DISLOCATION OF FINGER, INITIAL ENCOUNTER: Primary | ICD-10-CM

## 2024-06-05 PROCEDURE — 99024 POSTOP FOLLOW-UP VISIT: CPT | Performed by: SURGERY

## 2024-06-05 NOTE — PROGRESS NOTES
Assessment/Plan:  Patient ID: Bay Real 88 y.o. male   Surgery: Exploration of Left Index, Long, and Ring fingers; I&D of traumatic arthotomy of the left ring finger with partial tendon repair; I&D of left index finger with partial extensor tendon repair; I&D of left long finger proximal phalax fracture & traumatic arthotomy with extensor tendon reconstruction - Left  Date of Surgery: 5/24/2024    Discussed wound care. Patient can resume normal hygiene activities. Discussed that he cannot submerge the wound at this time.  Discussed with patient PT activities - messaged PT [place]l that they can start ROM of the long finger MCP joint in 10 days.      Follow Up:  6  week(s)    To Do Next Visit:  Reassess motion      CHIEF COMPLAINT:  Chief Complaint   Patient presents with    Left Hand - Follow-up         SUBJECTIVE:  Bay Real is a 88 y.o. year old male who presents for follow up after Exploration of Left Index, Long, and Ring fingers; I&D of traumatic arthotomy of the left ring finger with partial tendon repair; I&D of left index finger with partial extensor tendon repair; I&D of left long finger proximal phalax fracture & traumatic arthotomy with extensor tendon reconstruction - Left. Today patient has no pain. He denies any numbness or tingling. He is working with physical therapy on ROM of the index and ring fingers. He notes that he still has some stiffness.        PHYSICAL EXAMINATION:  General: well developed and well nourished, alert, oriented times 3, and appears comfortable  Psychiatric: Normal    MUSCULOSKELETAL EXAMINATION:  Incision: Clean, dry, intact  Surgery Site: normal, no evidence of infection   Range of Motion: As expected and Limited due to stiffness  Neurovascular status: Neuro intact, good cap refill  Activity Restrictions: Cast/splint restrictions  Done today: Sutures out        LABS REVIEWED:    HgA1c:   Lab Results   Component Value Date    HGBA1C 5.9 (H) 09/02/2021     BMP:   Lab  Results   Component Value Date    CALCIUM 8.7 05/21/2024    K 4.2 05/21/2024    CO2 23 05/21/2024     05/21/2024    BUN 18 05/21/2024    CREATININE 1.07 05/21/2024           PROCEDURES PERFORMED:  Procedures  No Procedures performed today    Scribe Attestation      I,:   am acting as a scribe while in the presence of the attending physician.:       I,:   personally performed the services described in this documentation    as scribed in my presence.:

## 2024-06-06 ENCOUNTER — OFFICE VISIT (OUTPATIENT)
Dept: OCCUPATIONAL THERAPY | Facility: CLINIC | Age: 89
End: 2024-06-06
Payer: COMMERCIAL

## 2024-06-06 DIAGNOSIS — S60.512A ABRASION OF LEFT HAND, INITIAL ENCOUNTER: ICD-10-CM

## 2024-06-06 DIAGNOSIS — Z98.890 STATUS POST SURGERY: Primary | ICD-10-CM

## 2024-06-06 PROCEDURE — 97110 THERAPEUTIC EXERCISES: CPT

## 2024-06-06 NOTE — PROGRESS NOTES
Daily Note     Today's date: 2024  Patient name: Bay Real  : 1935  MRN: [id number]  Referring provider: Alonzo Rodrigues MD  Dx:   Encounter Diagnosis     ICD-10-CM    1. Status post surgery  Z98.890       2. Abrasion of left hand, initial encounter  S60.512A                      Subjective: The doctor was happy      Objective: See treatment diary below      Assessment: Tolerated treatment well. Sutures removed on 24 by orthopedics. Cleared to begin MF PIP ROM on 6/15/24. Increased IF & RF PIP flexion to 60d. Made exercise splint for pt to use to 60d.       Plan: Continue per plan of care.  Progress treatment as tolerated.       Precautions: S/P I&D of traumatic arthotomy of the left ring finger with partial tendon repair; I&D of left index finger with partial extensor tendon repair; I&D of left long finger proximal phalax fracture & traumatic arthotomy with extensor tendon reconstruction    LF Extensor Tendon Reconstruction and Fracture  -PIP Splinted in volar splint no PIP motion due to tendon repair and dislocation until cleared by orthopedics     IF and RF Tendon Repair (Partial Tear)  - cleared for gentle AROM  - Begin with short arc motion to avoid extensor lag  - 24 PIP Flexion to 45 degrees progressing 10 degrees a week (With consideration to extensor lag)      Manuals          MEM (once sutures are removed)   8'          Scar massage (once sutures are removed)             Wound care  8'           ROM splint    Fabricated 60 degrees         Neuro Re-Ed                                                                                                        Ther Ex             HEP Short arc motion to 45 degree PIP flexion  55d (use ex splint) 10x, 4x/day 60D (use ex splint) 10x, 4x/day         Short arc motion  45 degrees in IF and RF flexion to full extension 55d into splint, full ext 60D    (use ex splint) 10x, 4x/day         MF DIP and MCP motion  8' completed  in splint to prevent PIP motion 8' 8'                                                                          Ther Activity                                                                              Modalities

## 2024-06-11 ENCOUNTER — OFFICE VISIT (OUTPATIENT)
Dept: OCCUPATIONAL THERAPY | Facility: CLINIC | Age: 89
End: 2024-06-11
Payer: COMMERCIAL

## 2024-06-11 DIAGNOSIS — Z98.890 STATUS POST SURGERY: Primary | ICD-10-CM

## 2024-06-11 DIAGNOSIS — S60.512A ABRASION OF LEFT HAND, INITIAL ENCOUNTER: ICD-10-CM

## 2024-06-11 PROCEDURE — 97140 MANUAL THERAPY 1/> REGIONS: CPT

## 2024-06-11 PROCEDURE — 97110 THERAPEUTIC EXERCISES: CPT

## 2024-06-11 NOTE — PROGRESS NOTES
Daily Note     Today's date: 2024  Patient name: Bay Real  : 1935  MRN: [id number]  Referring provider: Alonzo Rodrigues MD  Dx:   Encounter Diagnosis     ICD-10-CM    1. Status post surgery  Z98.890       2. Abrasion of left hand, initial encounter  S60.512A                      Subjective: I see the dr again next week.      Objective: See treatment diary below      Assessment: Tolerated treatment well. Pt still challenged with the 60d splint-IF PIP flexion better than RF. Began scar massage/skin mobilization.      Plan: Continue per plan of care.  Progress treatment as tolerated.       Precautions: S/P I&D of traumatic arthotomy of the left ring finger with partial tendon repair; I&D of left index finger with partial extensor tendon repair; I&D of left long finger proximal phalax fracture & traumatic arthotomy with extensor tendon reconstruction    LF Extensor Tendon Reconstruction and Fracture  -PIP Splinted in volar splint no PIP motion due to tendon repair and dislocation until cleared by orthopedics     IF and RF Tendon Repair (Partial Tear)  - cleared for gentle AROM  - Begin with short arc motion to avoid extensor lag  - 24 PIP Flexion to 45 degrees progressing 10 degrees a week (With consideration to extensor lag)      Manuals         MEM (once sutures are removed)   8'  10'        Scar massage (once sutures are removed)     10'        Wound care  8'           ROM splint    Fabricated 60 degrees         Neuro Re-Ed                                                                                                        Ther Ex             HEP Short arc motion to 45 degree PIP flexion  55d (use ex splint) 10x, 4x/day 60D (use ex splint) 10x, 4x/day         Short arc motion  45 degrees in IF and RF flexion to full extension 55d into splint, full ext 60D    (use ex splint) 10x, 4x/day         MF DIP and MCP motion  8' completed in splint to prevent PIP motion 8'  8' 8'                                                                         Ther Activity                                                                              Modalities

## 2024-06-13 ENCOUNTER — OFFICE VISIT (OUTPATIENT)
Dept: OCCUPATIONAL THERAPY | Facility: CLINIC | Age: 89
End: 2024-06-13
Payer: COMMERCIAL

## 2024-06-13 DIAGNOSIS — S60.512A ABRASION OF LEFT HAND, INITIAL ENCOUNTER: ICD-10-CM

## 2024-06-13 DIAGNOSIS — Z98.890 STATUS POST SURGERY: Primary | ICD-10-CM

## 2024-06-13 PROCEDURE — 97140 MANUAL THERAPY 1/> REGIONS: CPT

## 2024-06-13 PROCEDURE — 97110 THERAPEUTIC EXERCISES: CPT

## 2024-06-13 NOTE — PROGRESS NOTES
Daily Note     Today's date: 2024  Patient name: Bay Real  : 1935  MRN: [id number]  Referring provider: Alonzo Rodrigues MD  Dx:   Encounter Diagnosis     ICD-10-CM    1. Status post surgery  Z98.890       2. Abrasion of left hand, initial encounter  S60.512A                      Subjective: The scar massage makes it feel a lot more loose      Objective: See treatment diary below      Assessment: Tolerated treatment well. Therapist progressed patient to atleast 70 decreased AROM in the IF and RF. Patient IF PIP ROM is 68 degrees and RF PIP was 55. Therapist is able to begin MF PIP AROM on 6/15/24 as per orthopedics.       Plan: Continue per plan of care.  Progress treatment as tolerated.       Precautions: S/P I&D of traumatic arthotomy of the left ring finger with partial tendon repair; I&D of left index finger with partial extensor tendon repair; I&D of left long finger proximal phalax fracture & traumatic arthotomy with extensor tendon reconstruction    LF Extensor Tendon Reconstruction and Fracture  -PIP Splinted in volar splint no PIP motion due to tendon repair and dislocation until cleared by orthopedics     IF and RF Tendon Repair (Partial Tear)  - cleared for gentle AROM  - Begin with short arc motion to avoid extensor lag  - 24 PIP Flexion to 45 degrees progressing 10 degrees a week (With consideration to extensor lag)      Manuals        MEM (once sutures are removed)   8'  10' 10'       Scar massage (once sutures are removed)     10' 10'       Wound care  8'           ROM splint    Fabricated 60 degrees         Neuro Re-Ed                                                                                                        Ther Ex             HEP Short arc motion to 45 degree PIP flexion  55d (use ex splint) 10x, 4x/day 60D (use ex splint) 10x, 4x/day  70 D       Short arc motion  45 degrees in IF and RF flexion to full extension 55d into splint,  full ext 60D    (use ex splint) 10x, 4x/day  65D IF    55D RF       MF DIP and MCP motion  8' completed in splint to prevent PIP motion 8' 8' 8' 8'                                                                        Ther Activity                                                                              Modalities             MHP      5'

## 2024-06-18 ENCOUNTER — OFFICE VISIT (OUTPATIENT)
Dept: OCCUPATIONAL THERAPY | Facility: CLINIC | Age: 89
End: 2024-06-18
Payer: COMMERCIAL

## 2024-06-18 DIAGNOSIS — Z98.890 STATUS POST SURGERY: Primary | ICD-10-CM

## 2024-06-18 DIAGNOSIS — S60.512A ABRASION OF LEFT HAND, INITIAL ENCOUNTER: ICD-10-CM

## 2024-06-18 PROCEDURE — 97110 THERAPEUTIC EXERCISES: CPT

## 2024-06-18 NOTE — PROGRESS NOTES
"Daily Note     Today's date: 2024  Patient name: Bay Real  : 1935  MRN: [id number]  Referring provider: Alonzo Rodrigues MD  Dx:   Encounter Diagnosis     ICD-10-CM    1. Status post surgery  Z98.890       2. Abrasion of left hand, initial encounter  S60.512A                      Subjective: \"The middle finger hurts to move\".       Objective: See treatment diary below  IF: 50, 62, 30  MF: 40, 40  RF: 44, 50, 10    [id number] MHP  [id number] Manual/TE  [id number] unsup    Assessment: Tolerated treatment well. Patient has a follow up with orthopedics on 24. Therapist began gentle MF PIP AROM on this date, as per orthopedics. Patient had discomfort in PIP joint during first attempt at motion, as anticipated.       Plan: Continue per plan of care.  Progress treatment as tolerated.       Precautions: S/P I&D of traumatic arthotomy of the left ring finger with partial tendon repair; I&D of left index finger with partial extensor tendon repair; I&D of left long finger proximal phalax fracture & traumatic arthotomy with extensor tendon reconstruction    LF Extensor Tendon Reconstruction and Fracture  -PIP Splinted in volar splint no PIP motion due to tendon repair and dislocation until cleared by orthopedics     IF and RF Tendon Repair (Partial Tear)  - cleared for gentle AROM  - Begin with short arc motion to avoid extensor lag  - 24 PIP Flexion to 45 degrees progressing 10 degrees a week (With consideration to extensor lag)      Manuals       MEM (once sutures are removed)   8'  10' 10' 10      Scar massage (once sutures are removed)     10 10 10      Wound care  8'           ROM splint    Fabricated 60 degrees         Neuro Re-Ed                                                                                                        Ther Ex             HEP Short arc motion to 45 degree PIP flexion  55d (use ex splint) 10x, 4x/day 60D (use ex splint) 10x, " 4x/day  70 D       Short arc motion  45 degrees in IF and RF flexion to full extension 55d into splint, full ext 60D    (use ex splint) 10x, 4x/day  65D IF    55D RF 65D IF    40D MF    55D RF      MF DIP and MCP motion  8' completed in splint to prevent PIP motion 8' 8' 8' 8' 8'      [place] opp       All marbles                                                          Ther Activity                                                                              Modalities             P      5' 5'

## 2024-06-19 ENCOUNTER — OFFICE VISIT (OUTPATIENT)
Dept: OBGYN CLINIC | Facility: CLINIC | Age: 89
End: 2024-06-19

## 2024-06-19 VITALS
HEART RATE: 76 BPM | BODY MASS INDEX: 27.99 KG/M2 | WEIGHT: 189 LBS | SYSTOLIC BLOOD PRESSURE: 145 MMHG | DIASTOLIC BLOOD PRESSURE: 71 MMHG | HEIGHT: 69 IN

## 2024-06-19 DIAGNOSIS — S63.259A DISLOCATION OF FINGER, INITIAL ENCOUNTER: Primary | ICD-10-CM

## 2024-06-19 DIAGNOSIS — S61.219D: ICD-10-CM

## 2024-06-19 PROCEDURE — 99024 POSTOP FOLLOW-UP VISIT: CPT | Performed by: PHYSICIAN ASSISTANT

## 2024-06-19 NOTE — PROGRESS NOTES
Assessment/Plan:  Patient ID: Bay Real 88 y.o. male   Surgery: Exploration of Left Index, Long, and Ring fingers; I&D of traumatic arthotomy of the left ring finger with partial tendon repair; I&D of left index finger with partial extensor tendon repair; I&D of left long finger proximal phalax fracture & traumatic arthotomy with extensor tendon reconstruction - Left  Date of Surgery: 5/24/2024    Long finger wound is healing well   Patient should continue therapy for motion   Will discuss with attending regarding continuation of PIP splint to the long finger and message therapy        Follow Up:  6  week(s)    To Do Next Visit:         CHIEF COMPLAINT:  Chief Complaint   Patient presents with    Post-op         SUBJECTIVE:  Bay Real is a 88 y.o. year old male who presents for follow up after Exploration of Left Index, Long, and Ring fingers; I&D of traumatic arthotomy of the left ring finger with partial tendon repair; I&D of left index finger with partial extensor tendon repair; I&D of left long finger proximal phalax fracture & traumatic arthotomy with extensor tendon reconstruction - Left. Today patient has some pain in the fingers but states this is well controlled, although it does get worse after therapy. He does still wear a splint on the long finger.        PHYSICAL EXAMINATION:  General: well developed and well nourished, alert, oriented times 3, and appears comfortable  Psychiatric: Normal    MUSCULOSKELETAL EXAMINATION:  Incision: Clean, dry, intact  Surgery Site: normal, no evidence of infection   Range of Motion: Limited due to stiffness  Neurovascular status: Neuro intact, good cap refill  Activity Restrictions: Cast/splint restrictions       STUDIES REVIEWED:  No studies to review      LABS REVIEWED:    HgA1c:   Lab Results   Component Value Date    HGBA1C 5.9 (H) 09/02/2021     BMP:   Lab Results   Component Value Date    CALCIUM 8.7 05/21/2024    K 4.2 05/21/2024    CO2 23 05/21/2024      05/21/2024    BUN 18 05/21/2024    CREATININE 1.07 05/21/2024           PROCEDURES PERFORMED:  Procedures  No Procedures performed today        Bety Marie

## 2024-06-20 ENCOUNTER — OFFICE VISIT (OUTPATIENT)
Dept: OCCUPATIONAL THERAPY | Facility: CLINIC | Age: 89
End: 2024-06-20
Payer: COMMERCIAL

## 2024-06-20 DIAGNOSIS — Z98.890 STATUS POST SURGERY: Primary | ICD-10-CM

## 2024-06-20 DIAGNOSIS — S60.512A ABRASION OF LEFT HAND, INITIAL ENCOUNTER: ICD-10-CM

## 2024-06-20 PROCEDURE — 97110 THERAPEUTIC EXERCISES: CPT

## 2024-06-20 PROCEDURE — 97140 MANUAL THERAPY 1/> REGIONS: CPT

## 2024-06-20 NOTE — PROGRESS NOTES
"Daily Note     Today's date: 2024  Patient name: Bay Real  : 1935  MRN: [id number]  Referring provider: Alonzo Rodrigues MD  Dx:   Encounter Diagnosis     ICD-10-CM    1. Status post surgery  Z98.890       2. Abrasion of left hand, initial encounter  S60.512A                      Subjective: \"I've noticed a big difference since last week\".       Objective: See treatment diary below  IF: 60, 68  MF: 54, 44  RF: 50, 56        Assessment: Tolerated treatment well.  Patient had follow up with orthopedics on 24 where appropriate healing was noted. Patient was cleared to begin PIP motion in the LF, but they are to continue wearing their brace until cleared by orthopedics.       Plan: Continue per plan of care.  Progress treatment as tolerated.       Precautions: S/P I&D of traumatic arthotomy of the left ring finger with partial tendon repair; I&D of left index finger with partial extensor tendon repair; I&D of left long finger proximal phalax fracture & traumatic arthotomy with extensor tendon reconstruction    LF Extensor Tendon Reconstruction and Fracture  -PIP Splinted in volar splint no PIP motion due to tendon repair and dislocation until cleared by orthopedics     IF and RF Tendon Repair (Partial Tear)  - cleared for gentle AROM  - Begin with short arc motion to avoid extensor lag  - 24 PIP Flexion to 45 degrees progressing 10 degrees a week (With consideration to extensor lag)      Manuals      MEM (once sutures are removed)   8'  10' 10' 10 10     Scar massage (once sutures are removed)     10' 10' 10 10     Wound care  8'           ROM splint    Fabricated 60 degrees         Neuro Re-Ed                                                                                                        Ther Ex             HEP Short arc motion to 45 degree PIP flexion  55d (use ex splint) 10x, 4x/day 60D (use ex splint) 10x, 4x/day  70 D  70D IF and RF    45 " MF     Short arc motion  45 degrees in IF and RF flexion to full extension 55d into splint, full ext 60D    (use ex splint) 10x, 4x/day  65D IF    55D RF 65D IF    40D MF    55D RF 70D IF and RF    45 MF     MF DIP and MCP motion  8' completed in splint to prevent PIP motion 8' 8' 8' 8' 8' 8'     [place] opp       All marbles All marbles IF and RF     Towel scruntch         x20     Cube pickup        MF x 20                               Ther Activity                                                                              Modalities             MHP      5' 5' 5'

## 2024-06-24 ENCOUNTER — OFFICE VISIT (OUTPATIENT)
Dept: OCCUPATIONAL THERAPY | Facility: CLINIC | Age: 89
End: 2024-06-24
Payer: COMMERCIAL

## 2024-06-24 DIAGNOSIS — Z98.890 STATUS POST SURGERY: Primary | ICD-10-CM

## 2024-06-24 DIAGNOSIS — S60.512A ABRASION OF LEFT HAND, INITIAL ENCOUNTER: ICD-10-CM

## 2024-06-24 PROCEDURE — 97110 THERAPEUTIC EXERCISES: CPT

## 2024-06-24 PROCEDURE — 97140 MANUAL THERAPY 1/> REGIONS: CPT

## 2024-06-24 NOTE — PROGRESS NOTES
"Daily Note     Today's date: 2024  Patient name: Bay Real  : 1935  MRN: [id number]  Referring provider: Alonzo Rodrigues MD  Dx:   Encounter Diagnosis     ICD-10-CM    1. Status post surgery  Z98.890       2. Abrasion of left hand, initial encounter  S60.512A                      Subjective: \"I've been working it everyday\".       Objective: See treatment diary below  IF: 68, 62  MF: 66, 46  RF: 58, 56        Assessment: Tolerated treatment well.  Patient displayed gains in MCP motion today. Fabricated a new ROM guide for the MF to 50 degreees of PIP flexion.       Plan: Continue per plan of care.  Progress treatment as tolerated.       Precautions: S/P I&D of traumatic arthotomy of the left ring finger with partial tendon repair; I&D of left index finger with partial extensor tendon repair; I&D of left long finger proximal phalax fracture & traumatic arthotomy with extensor tendon reconstruction    LF Extensor Tendon Reconstruction and Fracture  -PIP Splinted in volar splint no PIP motion due to tendon repair and dislocation until cleared by orthopedics     IF and RF Tendon Repair (Partial Tear)  - cleared for gentle AROM  - Begin with short arc motion to avoid extensor lag  - 24 PIP Flexion to 45 degrees progressing 10 degrees a week (With consideration to extensor lag)      Manuals     MEM (once sutures are removed)   8'  10' 10' 10 10 5    Scar massage (once sutures are removed)     10' 10' 10 10 5    Wound care  8'           ROM splint    Fabricated 60 degrees         Neuro Re-Ed                                                                                                        Ther Ex             HEP Short arc motion to 45 degree PIP flexion  55d (use ex splint) 10x, 4x/day 60D (use ex splint) 10x, 4x/day  70 D  70D IF and RF    45 MF     Short arc motion  45 degrees in IF and RF flexion to full extension 55d into splint, full ext " 60D    (use ex splint) 10x, 4x/day  65D IF    55D RF 65D IF    40D MF    55D RF 70D IF and RF    45 MF 70D IF and RF    50 MF    MF DIP and MCP motion  8' completed in splint to prevent PIP motion 8' 8' 8' 8' 8' 8' 8'    [place] opp       All marbles All marbles IF and RF All marbles IF,MF,RF    Towel scruntch         x20 x20    Cube pickup        MF x 20 D/c                              Ther Activity                                                                              Modalities             MHP      5' 5' 5' 5'

## 2024-06-28 ENCOUNTER — OFFICE VISIT (OUTPATIENT)
Dept: OCCUPATIONAL THERAPY | Facility: CLINIC | Age: 89
End: 2024-06-28
Payer: COMMERCIAL

## 2024-06-28 DIAGNOSIS — S60.512A ABRASION OF LEFT HAND, INITIAL ENCOUNTER: ICD-10-CM

## 2024-06-28 DIAGNOSIS — I10 ESSENTIAL (PRIMARY) HYPERTENSION: ICD-10-CM

## 2024-06-28 DIAGNOSIS — Z98.890 STATUS POST SURGERY: Primary | ICD-10-CM

## 2024-06-28 PROCEDURE — 97140 MANUAL THERAPY 1/> REGIONS: CPT

## 2024-06-28 PROCEDURE — 97110 THERAPEUTIC EXERCISES: CPT

## 2024-06-28 NOTE — PROGRESS NOTES
"Daily Note     Today's date: 2024  Patient name: Bay Real  : 1935  MRN: [id number]  Referring provider: Alonzo Rodrigues MD  Dx:   Encounter Diagnosis     ICD-10-CM    1. Status post surgery  Z98.890       2. Abrasion of left hand, initial encounter  S60.512A                      Subjective: \"It's progressing\".       Objective: See treatment diary below  IF: 68, 62  MF: 68, 50  RF: 68, 62        Assessment: Tolerated treatment well.  Patient was cleared by orthopedics to d/c splint. Therapist recommended patient to wean out of splint, but to continue to wear it at nighttime for a little longer to prevent injury while sleeping. Patient digit AROM made significant improvement on this date. Patient was able to oppose to all digits.       Plan: Continue per plan of care.  Progress treatment as tolerated.       Precautions: S/P I&D of traumatic arthotomy of the left ring finger with partial tendon repair; I&D of left index finger with partial extensor tendon repair; I&D of left long finger proximal phalax fracture & traumatic arthotomy with extensor tendon reconstruction    LF Extensor Tendon Reconstruction and Fracture  -PIP Splinted in volar splint no PIP motion due to tendon repair and dislocation until cleared by orthopedics     IF and RF Tendon Repair (Partial Tear)  - cleared for gentle AROM  - Begin with short arc motion to avoid extensor lag  - 24 PIP Flexion to 45 degrees progressing 10 degrees a week (With consideration to extensor lag)      Manuals  6/   MEM (once sutures are removed)   8'  10' 10' 10 10 5 5   Scar massage (once sutures are removed)     10' 10' 10 10 5 5   Wound care  8'           ROM splint    Fabricated 60 degrees         Neuro Re-Ed                                                                                                        Ther Ex             HEP Short arc motion to 45 degree PIP flexion  55d (use ex splint) " 10x, 4x/day 60D (use ex splint) 10x, 4x/day  70 D  70D IF and RF    45 MF     Short arc motion  45 degrees in IF and RF flexion to full extension 55d into splint, full ext 60D    (use ex splint) 10x, 4x/day  65D IF    55D RF 65D IF    40D MF    55D RF 70D IF and RF    45 MF 70D IF and RF    50 MF 70D IF and RF    55 MF   MF DIP and MCP motion  8' completed in splint to prevent PIP motion 8' 8' 8' 8' 8' 8' 8'    [place] opp       All marbles All marbles IF and RF All marbles IF,MF,RF All marbles all digits   Towel scruntch         x20 x20 x20   Cube pickup        MF x 20 D/c    Pencil grasps          X1 14 pencils remaining                Ther Activity                                                                              Modalities             MHP      5' 5' 5' 5' 5'

## 2024-06-30 RX ORDER — LISINOPRIL 10 MG/1
10 TABLET ORAL DAILY
Qty: 90 TABLET | Refills: 1 | Status: SHIPPED | OUTPATIENT
Start: 2024-06-30

## 2024-07-01 ENCOUNTER — OFFICE VISIT (OUTPATIENT)
Dept: OCCUPATIONAL THERAPY | Facility: CLINIC | Age: 89
End: 2024-07-01
Payer: COMMERCIAL

## 2024-07-01 DIAGNOSIS — Z98.890 STATUS POST SURGERY: Primary | ICD-10-CM

## 2024-07-01 DIAGNOSIS — S60.512A ABRASION OF LEFT HAND, INITIAL ENCOUNTER: ICD-10-CM

## 2024-07-01 PROCEDURE — 97110 THERAPEUTIC EXERCISES: CPT

## 2024-07-01 PROCEDURE — 97140 MANUAL THERAPY 1/> REGIONS: CPT

## 2024-07-01 NOTE — PROGRESS NOTES
"Daily Note     Today's date: 2024  Patient name: Bay Real  : 1935  MRN: [id number]  Referring provider: Alonzo Rodrigues MD  Dx:   Encounter Diagnosis     ICD-10-CM    1. Status post surgery  Z98.890       2. Abrasion of left hand, initial encounter  S60.512A                      Subjective: \"It's progressing\".       Objective: See treatment diary below  IF: 68, 62  MF: 68, 50  RF: 68, 62        Assessment: Tolerated treatment well.  Patient was cleared by orthopedics to d/c splint. Therapist recommended patient to wean out of splint, but to continue to wear it at nighttime for a little longer to prevent injury while sleeping. Patient digit AROM made significant improvement on this date. Patient was able to oppose to all digits.       Plan: Continue per plan of care.  Progress treatment as tolerated.       Precautions: S/P I&D of traumatic arthotomy of the left ring finger with partial tendon repair; I&D of left index finger with partial extensor tendon repair; I&D of left long finger proximal phalax fracture & traumatic arthotomy with extensor tendon reconstruction    LF Extensor Tendon Reconstruction and Fracture  -PIP Splinted in volar splint no PIP motion due to tendon repair and dislocation until cleared by orthopedics     IF and RF Tendon Repair (Partial Tear)  - cleared for gentle AROM  - Begin with short arc motion to avoid extensor lag  - 24 PIP Flexion to 45 degrees progressing 10 degrees a week (With consideration to extensor lag)      Manuals  6/   MEM (once sutures are removed)   8'  10' 10' 10 10 5 5   Scar massage (once sutures are removed)     10' 10' 10 10 5 5   Wound care  8'           ROM splint    Fabricated 60 degrees         Neuro Re-Ed                                                                                                        Ther Ex             HEP Short arc motion to 45 degree PIP flexion  55d (use ex splint) " 10x, 4x/day 60D (use ex splint) 10x, 4x/day  70 D  70D IF and RF    45 MF     Short arc motion  45 degrees in IF and RF flexion to full extension 55d into splint, full ext 60D    (use ex splint) 10x, 4x/day  65D IF    55D RF 65D IF    40D MF    55D RF 70D IF and RF    45 MF 70D IF and RF    50 MF 70D IF and RF    55 MF   MF DIP and MCP motion  8' completed in splint to prevent PIP motion 8' 8' 8' 8' 8' 8' 8'    [place] opp       All marbles All marbles IF and RF All marbles IF,MF,RF All marbles all digits   Towel scruntch         x20 x20 x20   Cube pickup        MF x 20 D/c    Pencil grasps          X1 14 pencils remaining                Ther Activity                                                                              Modalities             MHP      5' 5' 5' 5' 5'

## 2024-07-03 ENCOUNTER — EVALUATION (OUTPATIENT)
Dept: OCCUPATIONAL THERAPY | Facility: CLINIC | Age: 89
End: 2024-07-03
Payer: COMMERCIAL

## 2024-07-03 DIAGNOSIS — S60.512A ABRASION OF LEFT HAND, INITIAL ENCOUNTER: ICD-10-CM

## 2024-07-03 DIAGNOSIS — Z98.890 STATUS POST SURGERY: Primary | ICD-10-CM

## 2024-07-03 PROCEDURE — 97140 MANUAL THERAPY 1/> REGIONS: CPT

## 2024-07-03 PROCEDURE — 97110 THERAPEUTIC EXERCISES: CPT

## 2024-07-03 NOTE — PROGRESS NOTES
OT Re-Evaluation     Today's date: 7/3/2024  Patient name: Bay Real  : 1935  MRN: [id number]  Referring provider: Alonzo Rodrigues MD  Dx:   Encounter Diagnosis     ICD-10-CM    1. Status post surgery  Z98.890       2. Abrasion of left hand, initial encounter  S60.512A                        Assessment  Impairments: abnormal coordination, abnormal or restricted ROM, activity intolerance, impaired physical strength and pain with function  Symptom irritability: moderate    Assessment details: (Initial Evaluation) Patient initially presented to skilled OP OT hand therapy services s/p I&D of traumatic arthotomy of the left ring finger with partial tendon repair, I&D of left index finger with partial extensor tendon repair; I&D of left long finger proximal phalax fracture & traumatic arthotomy with extensor tendon reconstruction. Patient surgery took place on 24. Patient initial injury occurred on 24 as the result of mechanical fall which occurred when pushing a heavy garbage can. Patient presented to ED with lacerations to multiple digits and a deformity of the PIP joint of the LF. X-rays displayed a PIP joint dislocation. Upon examination the extensor tendon of the LF was lacerated. Further extensor tendon injuries were discovered during surgical examination and subsequently repaired. Prior to initial measurements and evaluation patient was instructed on precautions. Patient was cleared by orthopedics for gentle motion of the entirety of the RF and IF at initial evaluation. The LF DIP and MCP joints are cleared for gentle motion but their PIP joint was to be immobilized in a volar splint at the PIP joint until their follow up with orthopedics. Therapist removed post operative dressing and fabricated a volar PIP blocking splint for the MF. Prior to patient leaving therapist re-applied sterile dressing and instructed patient on how to apply new following hygiene over the next few days.  Patient digit ROM was decreased as anticipated, but is within the outlined protocol. Edema measurements were deferred secondary to lacerations but is visibly swollen in the dorsal hand and all digits. Pain was reported to be mild to moderate in intensity.    (Re-evaluation 7/03/24) Patient presents to re-evaluation after consistently attending OP OT hand therapy services. Patient is progressing well and making appropriate gains in accordance to surgical protocol. Patient surgical incisions and lacerations have healed very well. Moderate scar tissue and general tightness does impact ROM in all digits, with the most limitation in the MF. Patient had most recent follow up with orthopedics on 6/19/24 where appropriate healing was noted. Orthopedics cleared patient to discontinue MF volar PIP blocking splint at this time. Patient digit flexion is improving incrementally each week as per protocol, but motion is still significantly limited in the three post operative digits. Extensor lag is improving. Patient reports moderate pain during general ROM. Edema is resolving. Therapist continued to defer resistive exercises and muscle testing until cleared by orthopedics. Patient would benefit from continued OP OT hand therapy services to regain full digit motion, to decrease scar tissue, and to assist patient with returning to WNL for all ADLs. See below for a more detailed assessment.   Understanding of Dx/Px/POC: good     Prognosis: good    Goals  STG:    Patient will display increased PIP flexion by >10 degrees each week over the next 4 weeks- MET    Patient incisions will fully heal and sutures will be removed at subsequent orthopedics appointment- MET    Patient edema will decrease by 1-3 mm at the P1's of 2-4th digits in 4 weeks- MET    Patient will report compliance to established HEP all all subsequent visits- MET    LTG:    Patient will display digit AROM WFL in 8 weeks or discharge    Patient edema will be resolved in  "the entire hand in 8 weeks or discharge    New STG:    Patient will display an increased MELENDEZ of >30 degrees per digit in 4 weeks    Patient will be cleared to begin gentle PROM to allow for continued gains in motion    Patient will increase FOTO score by >20 points in 4 weeks            Plan  Patient would benefit from: custom splinting, skilled occupational therapy and OT eval  Referral necessary: No  Planned modality interventions: ultrasound, thermotherapy: hydrocollator packs and unattended electrical stimulation    Planned therapy interventions: IADL retraining, patient education, self care, manual therapy, orthotic fitting/training, strengthening, stretching, therapeutic activities, therapeutic exercise, home exercise program, functional ROM exercises and fine motor coordination training    Frequency: 2x week  Duration in weeks: 10  Plan of Care beginning date: 7/3/2024  Plan of Care expiration date: 9/3/2024  Treatment plan discussed with: patient  Plan details: Patient would benefit from continued skilled occupational therapy services 2 times per week for 8 weeks to return to prior level of function and achieve all of their established goals. Thank you for the referral.       Subjective Evaluation    History of Present Illness  Mechanism of injury: surgery and trauma  Mechanism of injury: Subjective:  \"The pain is much better\". \"The ringer finger and index finger is going good\". \"The middle finger has been very difficult\". \"I'm still staying downstairs because I can't  the railing\". \"I can dress and bath no problem\". \"I think it's getting better\". \"It never completely doesn't hurt\". \"It's mostly the middle finger\". \"I do the exercises everyday\".           Recurrent probem    Quality of life: good    Patient Goals  Patient goals for therapy: decreased edema, decreased pain, increased motion and increased strength    Pain  Current pain ratin  At best pain ratin  At worst pain ratin  Location: L " dorsal hand, 2nd, 3rd, and 4th digit  Quality: sharp  Relieving factors: rest, relaxation and support  Exacerbated by: General motion.  Progression: improved    Social Support    Employment status: not working  Hand dominance: right      Diagnostic Tests  X-ray: abnormal  Treatments  Previous treatment: immobilization  Current treatment: immobilization and occupational therapy    Objective     Observations     Right Wrist/Hand   Positive for edema. Negative for abrasion and incision.     Additional Observation Details  Well healed abrasion on dorsal hand.  Well healed incisions on the dorsal fingers. Moderate scar tissue formation on the MF     Neurological Testing     Sensation     Wrist/Hand   Left   Intact: light touch    Right   Intact: light touch    Active Range of Motion     Right Digits   Flexion   Index     MCP: 72    PIP: 62    DIP: 32  Middle     MCP: 64    PIP: 50    DIP: 16  Ring     MCP: 60    PIP: 54    DIP: 18  Extension   Index     PIP: -10  Middle     PIP: -18  Ring     PIP: -4    Strength/Myotome Testing     Additional Strength Details  Deferred secondary to healing timeline. Will access as able    Swelling     Right Wrist/Hand   Index     Proximal: 6.7 cm  Middle     Proximal: 7 cm  Ring     Proximal: 7 cm    Additional Swelling Details  Deferred edema measurements secondary to healing incisions and abrasions. Will access as able.                Precautions: S/P I&D of traumatic arthotomy of the left ring finger with partial tendon repair; I&D of left index finger with partial extensor tendon repair; I&D of left long finger proximal phalax fracture & traumatic arthotomy with extensor tendon reconstruction    LF Extensor Tendon Reconstruction and Fracture  -PIP Splinted in volar splint no PIP motion due to tendon repair and dislocation until cleared by orthopedics     IF and RF Tendon Repair (Partial Tear)  - cleared for gentle AROM  - Begin with short arc motion to avoid extensor lag  - PIP  Flexion to 45 degrees progressing 10 degrees a week (With consideration to extensor lag)      Manuals 7/3 5/31 6/4 6/6 6/11 6/13 6/18 6/20 6/24 6/28   MEM (once sutures are removed) 5  8'  10' 10' 10 10 5 5   Scar massage (once sutures are removed) 5    10' 10' 10 10 5 5   Wound care  8'           Re-evaluation Completed            ROM splint    Fabricated 60 degrees         Neuro Re-Ed                                                                                                        Ther Ex             HEP   55d (use ex splint) 10x, 4x/day 60D (use ex splint) 10x, 4x/day  70 D  70D IF and RF    45 MF     Short arc motion 8' 45 degrees in IF and RF flexion to full extension 55d into splint, full ext 60D    (use ex splint) 10x, 4x/day  65D IF    55D RF 65D IF    40D MF    55D RF 70D IF and RF    45 MF 70D IF and RF    50 MF 70D IF and RF    55 MF   MF DIP and MCP motion 8' 8' completed in splint to prevent PIP motion 8' 8' 8' 8' 8' 8' 8'    [place] opp All marbles all digits      All marbles All marbles IF and RF All marbles IF,MF,RF All marbles all digits   Towel scruntch  x10       x20 x20 x20   Cube pickup        MF x 20 D/c    Pencil grasps X1          X1 14 pencils remaining                Ther Activity                                                                              Modalities             MHP 5'     5' 5' 5' 5' 5'

## 2024-07-09 ENCOUNTER — OFFICE VISIT (OUTPATIENT)
Dept: OCCUPATIONAL THERAPY | Facility: CLINIC | Age: 89
End: 2024-07-09
Payer: COMMERCIAL

## 2024-07-09 DIAGNOSIS — Z98.890 STATUS POST SURGERY: Primary | ICD-10-CM

## 2024-07-09 DIAGNOSIS — S60.512A ABRASION OF LEFT HAND, INITIAL ENCOUNTER: ICD-10-CM

## 2024-07-09 PROCEDURE — 97110 THERAPEUTIC EXERCISES: CPT

## 2024-07-09 NOTE — PROGRESS NOTES
Daily Note     Today's date: 2024  Patient name: Bay Real  : 1935  MRN: [id number]  Referring provider: Alonzo Rodrigues MD  Dx:   Encounter Diagnosis     ICD-10-CM    1. Status post surgery  Z98.890       2. Abrasion of left hand, initial encounter  S60.512A                      Subjective: It is very sore today      Objective: See treatment diary below  [id number] MHP  [id number] unsup  [id number] 32 Manual/TE    Assessment: Tolerated treatment well. Began gentle isolated PROM on this date. Patient has moderate pain but tolerated it well. Fabricated a blocking splint to isolate PIP and DIP motion. PIP motion is improving in isolation but is decreased compositely.       Plan: Continue per plan of care.  Progress treatment as tolerated.       Precautions: S/P I&D of traumatic arthotomy of the left ring finger with partial tendon repair; I&D of left index finger with partial extensor tendon repair; I&D of left long finger proximal phalax fracture & traumatic arthotomy with extensor tendon reconstruction    LF Extensor Tendon Reconstruction and Fracture  -PIP Splinted in volar splint no PIP motion due to tendon repair and dislocation until cleared by orthopedics     IF and RF Tendon Repair (Partial Tear)  - cleared for gentle AROM  - Begin with short arc motion to avoid extensor lag  - PIP Flexion to 45 degrees progressing 10 degrees a week (With consideration to extensor lag)      Manuals 7/3 7/9 6/4 6/6 6/11 6/13 6/18 6/20 6/24 6/28   MEM (once sutures are removed) 5 5 8'  10' 10' 10 10 5 5   Scar massage (once sutures are removed) 5 5   10' 10' 10 10 5 5   Wound care             Re-evaluation Completed            ROM splint    Fabricated 60 degrees         Neuro Re-Ed                                                                                                        Ther Ex             HEP   55d (use ex splint) 10x, 4x/day 60D (use ex splint) 10x, 4x/day  70 D  70D IF and RF    45 MF      Short arc motion 8' 8' 55d into splint, full ext 60D    (use ex splint) 10x, 4x/day  65D IF    55D RF 65D IF    40D MF    55D RF 70D IF and RF    45 MF 70D IF and RF    50 MF 70D IF and RF    55 MF   Gentle PROM all joints  8'           MF DIP and MCP motion 8' 8' 8' 8' 8' 8' 8' 8' 8'    [place] opp All marbles all digits All marbles     All marbles All marbles IF and RF All marbles IF,MF,RF All marbles all digits   Towel scruntch  x10 x10      x20 x20 x20   Cube pickup        MF x 20 D/c    Pencil grasps X1  x2        X1 14 pencils remaining                Ther Activity                                                                              Modalities             MHP 5' 5'    5' 5' 5' 5' 5'

## 2024-07-11 ENCOUNTER — OFFICE VISIT (OUTPATIENT)
Dept: OCCUPATIONAL THERAPY | Facility: CLINIC | Age: 89
End: 2024-07-11
Payer: COMMERCIAL

## 2024-07-11 DIAGNOSIS — S60.512A ABRASION OF LEFT HAND, INITIAL ENCOUNTER: ICD-10-CM

## 2024-07-11 DIAGNOSIS — Z98.890 STATUS POST SURGERY: Primary | ICD-10-CM

## 2024-07-11 PROCEDURE — 97140 MANUAL THERAPY 1/> REGIONS: CPT

## 2024-07-11 PROCEDURE — 97110 THERAPEUTIC EXERCISES: CPT

## 2024-07-11 NOTE — PROGRESS NOTES
Daily Note     Today's date: 2024  Patient name: Bay Real  : 1935  MRN: [id number]  Referring provider: Alonzo Rodrigues MD  Dx:   Encounter Diagnosis     ICD-10-CM    1. Status post surgery  Z98.890       2. Abrasion of left hand, initial encounter  S60.512A                        Subjective: I notice a difference      Objective: See treatment diary below      Assessment: Tolerated treatment well. Continued gentle PROM of all joints. DIP motion is the most limited at this time as well as composite digit flexion. Isolated MCP and PIP ROM is returning to WFL.       Plan: Continue per plan of care.  Progress treatment as tolerated.       Precautions: S/P I&D of traumatic arthotomy of the left ring finger with partial tendon repair; I&D of left index finger with partial extensor tendon repair; I&D of left long finger proximal phalax fracture & traumatic arthotomy with extensor tendon reconstruction    LF Extensor Tendon Reconstruction and Fracture  -PIP Splinted in volar splint no PIP motion due to tendon repair and dislocation until cleared by orthopedics     IF and RF Tendon Repair (Partial Tear)  - cleared for gentle AROM  - Begin with short arc motion to avoid extensor lag  - PIP Flexion to 45 degrees progressing 10 degrees a week (With consideration to extensor lag)      Manuals 7/3 7/9 7/11 6/6 6/11 6/13 6/18 6/20 6/24 6/28   MEM (once sutures are removed) 5 5 5  10' 10' 10 10 5 5   Scar massage (once sutures are removed) 5 5 5  10' 10' 10 10 5 5   Wound care             Re-evaluation Completed            ROM splint    Fabricated 60 degrees         Neuro Re-Ed                                                                                                        Ther Ex             HEP    60D (use ex splint) 10x, 4x/day  70 D  70D IF and RF    45 MF     Short arc motion 8' 8' 8' 60D    (use ex splint) 10x, 4x/day  65D IF    55D RF 65D IF    40D MF    55D RF 70D IF and RF    45 MF 70D IF and  RF    50 MF 70D IF and RF    55 MF   Gentle PROM all joints  8' 8'          MF DIP and MCP motion 8' 8' 8' 8' 8' 8' 8' 8' 8'    [place] opp All marbles all digits All marbles All marbles    All marbles All marbles IF and RF All marbles IF,MF,RF All marbles all digits   Towel scruntch  x10 x10 x20     x20 x20 x20   Cube pickup        MF x 20 D/c    Pencil grasps X1  x2 x2       X1 14 pencils remaining                Ther Activity                                                                              Modalities             MHP 5' 5' 5'   5' 5' 5' 5' 5'

## 2024-07-16 ENCOUNTER — OFFICE VISIT (OUTPATIENT)
Dept: OCCUPATIONAL THERAPY | Facility: CLINIC | Age: 89
End: 2024-07-16
Payer: COMMERCIAL

## 2024-07-16 DIAGNOSIS — Z98.890 STATUS POST SURGERY: Primary | ICD-10-CM

## 2024-07-16 DIAGNOSIS — S60.512A ABRASION OF LEFT HAND, INITIAL ENCOUNTER: ICD-10-CM

## 2024-07-16 PROCEDURE — 97110 THERAPEUTIC EXERCISES: CPT

## 2024-07-16 PROCEDURE — 97140 MANUAL THERAPY 1/> REGIONS: CPT

## 2024-07-16 NOTE — PROGRESS NOTES
Daily Note     Today's date: 2024  Patient name: Bay Real  : 1935  MRN: [id number]  Referring provider: Alonzo Rodrigues MD  Dx:   Encounter Diagnosis     ICD-10-CM    1. Status post surgery  Z98.890       2. Abrasion of left hand, initial encounter  S60.512A                        Subjective: I notice a difference with using the brace.      Objective: See treatment diary below      Assessment: Tolerated treatment well. Pt better able to isolate PIP joints with exercise splint. He was able to do towel crunches easier today. He stil is limited with dexterity (unable to  key pegs.      Plan: Continue per plan of care.  Progress treatment as tolerated.       Precautions: S/P I&D of traumatic arthotomy of the left ring finger with partial tendon repair; I&D of left index finger with partial extensor tendon repair; I&D of left long finger proximal phalax fracture & traumatic arthotomy with extensor tendon reconstruction    LF Extensor Tendon Reconstruction and Fracture  -PIP Splinted in volar splint no PIP motion due to tendon repair and dislocation until cleared by orthopedics     IF and RF Tendon Repair (Partial Tear)  - cleared for gentle AROM  - Begin with short arc motion to avoid extensor lag  - PIP Flexion to 45 degrees progressing 10 degrees a week (With consideration to extensor lag)      Manuals 7/3 7/9 7/11 7/16 6/11 6/13 6/18 6/20 6/24 6/28   MEM (once sutures are removed) 5 5 5 5' 10' 10' 10 10 5 5   Scar massage (once sutures are removed) 5 5 5 5' 10' 10' 10 10 5 5   Wound care             Re-evaluation Completed            ROM splint             Neuro Re-Ed                                                                                                        Ther Ex             HEP      70 D  70D IF and RF    45 MF     Short arc motion 8' 8' 8' Full ROM as able  65D IF    55D RF 65D IF    40D MF    55D RF 70D IF and RF    45 MF 70D IF and RF    50 MF 70D IF and RF    55 MF    Gentle PROM all joints  8' 8' 8'         MF DIP and MCP motion 8' 8' 8' Blocked ROM all joints 8' 8' 8' 8' 8'    [place] opp All marbles all digits All marbles All marbles Marbles, sm colored pegs   All marbles All marbles IF and RF All marbles IF,MF,RF All marbles all digits   Towel scruntch  x10 x10 x20 x20    x20 x20 x20   Cube pickup        MF x 20 D/c    Pencil grasps X1  x2 x2 2x      X1 14 pencils remaining                Ther Activity                                                                              Modalities             MHP 5' 5' 5' 5'  5' 5' 5' 5' 5'

## 2024-07-18 ENCOUNTER — OFFICE VISIT (OUTPATIENT)
Dept: OCCUPATIONAL THERAPY | Facility: CLINIC | Age: 89
End: 2024-07-18
Payer: COMMERCIAL

## 2024-07-18 DIAGNOSIS — S60.512A ABRASION OF LEFT HAND, INITIAL ENCOUNTER: ICD-10-CM

## 2024-07-18 DIAGNOSIS — Z98.890 STATUS POST SURGERY: Primary | ICD-10-CM

## 2024-07-18 PROCEDURE — 97110 THERAPEUTIC EXERCISES: CPT

## 2024-07-18 PROCEDURE — 97140 MANUAL THERAPY 1/> REGIONS: CPT

## 2024-07-18 NOTE — PROGRESS NOTES
Daily Note     Today's date: 2024  Patient name: Bay Real  : 1935  MRN: [id number]  Referring provider: Alonzo Rodrigues MD  Dx:   Encounter Diagnosis     ICD-10-CM    1. Status post surgery  Z98.890       2. Abrasion of left hand, initial encounter  S60.512A                        Subjective: I have to say it definitely feels like I can move the fingers with more ease.      Objective: See treatment diary below      Assessment: Tolerated treatment well. Continued difficulty with dexterity, [place] opp to MF & RF. He does feel he is able to do more light activities with his left hand.      Plan: Continue per plan of care.  Progress treatment as tolerated.       Precautions: S/P I&D of traumatic arthotomy of the left ring finger with partial tendon repair; I&D of left index finger with partial extensor tendon repair; I&D of left long finger proximal phalax fracture & traumatic arthotomy with extensor tendon reconstruction    LF Extensor Tendon Reconstruction and Fracture  -PIP Splinted in volar splint no PIP motion due to tendon repair and dislocation until cleared by orthopedics     IF and RF Tendon Repair (Partial Tear)  - cleared for gentle AROM  - Begin with short arc motion to avoid extensor lag  - PIP Flexion to 45 degrees progressing 10 degrees a week (With consideration to extensor lag)      Manuals 7/3 7/9 7/11 7/16 7/18 6/13 6/18 6/20 6/24 6/28   MEM (once sutures are removed) 5 5 5 5' 5 10' 10 10 5 5   Scar massage (once sutures are removed) 5 5 5 5' 5' 10' 10 10 5 5   Wound care             Re-evaluation Completed            ROM splint             Neuro Re-Ed                                                                                                        Ther Ex             HEP      70 D  70D IF and RF    45 MF     Short arc motion 8' 8' 8' Full ROM as able Full ROM as able 65D IF    55D RF 65D IF    40D MF    55D RF 70D IF and RF    45 MF 70D IF and RF    50 MF 70D IF and  RF    55 MF   Gentle PROM all joints  8' 8' 8' 8'        MF DIP and MCP motion 8' 8' 8' Blocked ROM all joints  8' 8' 8' 8'    [place] opp All marbles all digits All marbles All marbles Marbles, sm colored pegs Colored pegs  All marbles All marbles IF and RF All marbles IF,MF,RF All marbles all digits   Towel scruntch  x10 x10 x20 x20    x20 x20 x20   Cube pickup        MF x 20 D/c    Pencil grasps X1  x2 x2 2x 2x     X1 14 pencils remaining                Ther Activity                                                                              Modalities             MHP 5' 5' 5' 5' 5' 5' 5' 5' 5' 5'

## 2024-07-23 ENCOUNTER — OFFICE VISIT (OUTPATIENT)
Dept: OCCUPATIONAL THERAPY | Facility: CLINIC | Age: 89
End: 2024-07-23
Payer: COMMERCIAL

## 2024-07-23 DIAGNOSIS — S60.512A ABRASION OF LEFT HAND, INITIAL ENCOUNTER: ICD-10-CM

## 2024-07-23 DIAGNOSIS — Z98.890 STATUS POST SURGERY: Primary | ICD-10-CM

## 2024-07-23 PROCEDURE — 97110 THERAPEUTIC EXERCISES: CPT

## 2024-07-23 PROCEDURE — 97140 MANUAL THERAPY 1/> REGIONS: CPT

## 2024-07-23 NOTE — PROGRESS NOTES
Daily Note     Today's date: 2024  Patient name: Bay Real  : 1935  MRN: [id number]  Referring provider: Alonzo Rodrigues MD  Dx:   Encounter Diagnosis     ICD-10-CM    1. Status post surgery  Z98.890       2. Abrasion of left hand, initial encounter  S60.512A                        Subjective: I still have trouble making a fist, but I feel like I can do more with the hand.      Objective: See treatment diary below      Assessment: Tolerated treatment well. Pt was challenged with opp to RF today, but was able to oppose to IF and MF without difficulty.      Plan: Continue per plan of care.  Progress treatment as tolerated.       Precautions: S/P I&D of traumatic arthotomy of the left ring finger with partial tendon repair; I&D of left index finger with partial extensor tendon repair; I&D of left long finger proximal phalax fracture & traumatic arthotomy with extensor tendon reconstruction    LF Extensor Tendon Reconstruction and Fracture  -PIP Splinted in volar splint no PIP motion due to tendon repair and dislocation until cleared by orthopedics     IF and RF Tendon Repair (Partial Tear)  - cleared for gentle AROM  - Begin with short arc motion to avoid extensor lag  - PIP Flexion to 45 degrees progressing 10 degrees a week (With consideration to extensor lag)      Manuals 7/3 7/9 7/11 7/16 7/18 7/23 6/18 6/20 6/24 6/28   MEM (once sutures are removed) 5 5 5 5' 5  10 10 5 5   Scar massage (once sutures are removed) 5 5 5 5' 5' 8' IASTM dorsal fingers 10 10 5 5   Wound care             Re-evaluation Completed            ROM splint             Neuro Re-Ed                                                                                                        Ther Ex             HEP        70D IF and RF    45 MF     Short arc motion 8' 8' 8' Full ROM as able Full ROM as able  65D IF    40D MF    55D RF 70D IF and RF    45 MF 70D IF and RF    50 MF 70D IF and RF    55 MF   Gentle PROM all joints  8'  8' 8' 8' 8'       MF DIP and MCP motion 8' 8' 8' Blocked ROM all joints  Blocked ROM all joints, blocked hook with pencil 8' 8' 8'    [place] opp All marbles all digits All marbles All marbles Marbles, sm colored pegs Colored pegs Colored pegs to IF/MF in, opp to RF out All marbles All marbles IF and RF All marbles IF,MF,RF All marbles all digits   Towel scruntch  x10 x10 x20 x20    x20 x20 x20   Cube pickup        MF x 20 D/c    Pencil grasps X1  x2 x2 2x 2x 2x    X1 14 pencils remaining                Ther Activity                                                                              Modalities             MHP 5' 5' 5' 5' 5' 5' 5' 5' 5' 5'

## 2024-07-25 ENCOUNTER — OFFICE VISIT (OUTPATIENT)
Dept: OCCUPATIONAL THERAPY | Facility: CLINIC | Age: 89
End: 2024-07-25
Payer: COMMERCIAL

## 2024-07-25 DIAGNOSIS — S60.512A ABRASION OF LEFT HAND, INITIAL ENCOUNTER: ICD-10-CM

## 2024-07-25 DIAGNOSIS — Z98.890 STATUS POST SURGERY: Primary | ICD-10-CM

## 2024-07-25 PROCEDURE — 97140 MANUAL THERAPY 1/> REGIONS: CPT

## 2024-07-25 PROCEDURE — 97110 THERAPEUTIC EXERCISES: CPT

## 2024-07-25 NOTE — PROGRESS NOTES
Daily Note     Today's date: 2024  Patient name: Bay Real  : 1935  MRN: [id number]  Referring provider: Alonzo Rodrigeus MD  Dx:   Encounter Diagnosis     ICD-10-CM    1. Status post surgery  Z98.890       2. Abrasion of left hand, initial encounter  S60.512A                          Subjective: I notice I can  a little better now.     Objective: See treatment diary below      Assessment: Tolerated treatment well. Continued stiffness when forming a composite fist. All joints in when isolated are gaining ROM. Considering fabricating a static progressive splint.       Plan: Continue per plan of care.  Progress treatment as tolerated.       Precautions: S/P I&D of traumatic arthotomy of the left ring finger with partial tendon repair; I&D of left index finger with partial extensor tendon repair; I&D of left long finger proximal phalax fracture & traumatic arthotomy with extensor tendon reconstruction    LF Extensor Tendon Reconstruction and Fracture  -PIP Splinted in volar splint no PIP motion due to tendon repair and dislocation until cleared by orthopedics     IF and RF Tendon Repair (Partial Tear)  - cleared for gentle AROM  - Begin with short arc motion to avoid extensor lag  - PIP Flexion to 45 degrees progressing 10 degrees a week (With consideration to extensor lag)      Manuals 7/3 7/9 7/11 7/16 7/18 7/23 7/25 6/20 6/24 6/28   MEM (once sutures are removed) 5 5 5 5' 5   10 5 5   Scar massage (once sutures are removed) 5 5 5 5' 5' 8' IASTM dorsal fingers 8' IASTM dorsal fingers 10 5 5   Wound care             Re-evaluation Completed            ROM splint             Neuro Re-Ed                                                                                                        Ther Ex             HEP        70D IF and RF    45 MF     Short arc motion 8' 8' 8' Full ROM as able Full ROM as able   70D IF and RF    45 MF 70D IF and RF    50 MF 70D IF and RF    55 MF   Gentle PROM all  joints  8' 8' 8' 8' 8' 10'      MF DIP and MCP motion 8' 8' 8' Blocked ROM all joints  Blocked ROM all joints, blocked hook with pencil Blocked ROM all joints, blocked hook with pencil 8' 8'    [place] opp All marbles all digits All marbles All marbles Marbles, sm colored pegs Colored pegs Colored pegs to IF/MF in, opp to RF out Colored pegs to IF/MF in, opp to RF out All marbles IF and RF All marbles IF,MF,RF All marbles all digits   Towel scruntch  x10 x10 x20 x20    x20 x20 x20   Cube pickup        MF x 20 D/c    Pencil grasps X1  x2 x2 2x 2x 2x 2x   X1 14 pencils remaining                Ther Activity                                                                              Modalities             MHP 5' 5' 5' 5' 5' 5' 5' 5' 5' 5'

## 2024-07-30 ENCOUNTER — OFFICE VISIT (OUTPATIENT)
Dept: OCCUPATIONAL THERAPY | Facility: CLINIC | Age: 89
End: 2024-07-30
Payer: COMMERCIAL

## 2024-07-30 DIAGNOSIS — S60.512A ABRASION OF LEFT HAND, INITIAL ENCOUNTER: ICD-10-CM

## 2024-07-30 DIAGNOSIS — Z98.890 STATUS POST SURGERY: Primary | ICD-10-CM

## 2024-07-30 PROCEDURE — 97140 MANUAL THERAPY 1/> REGIONS: CPT

## 2024-07-30 PROCEDURE — 97110 THERAPEUTIC EXERCISES: CPT

## 2024-07-30 NOTE — PROGRESS NOTES
"Daily Note     Today's date: 2024  Patient name: Bay Real  : 1935  MRN: [id number]  Referring provider: Alonzo Rodrigues MD  Dx:   Encounter Diagnosis     ICD-10-CM    1. Status post surgery  Z98.890       2. Abrasion of left hand, initial encounter  S60.512A                          Subjective: \"I'm able to  the railing to finally get upstairs\". \"I gripped a doorknob today too\".   Objective: See treatment diary below      Assessment: Tolerated treatment well. Patient has a follow up with orthopedics on 24. Provided patient with a custom static progressive splint to increase digit motion.      Plan: Continue per plan of care.  Progress treatment as tolerated.       Precautions: S/P I&D of traumatic arthotomy of the left ring finger with partial tendon repair; I&D of left index finger with partial extensor tendon repair; I&D of left long finger proximal phalax fracture & traumatic arthotomy with extensor tendon reconstruction    LF Extensor Tendon Reconstruction and Fracture  -PIP Splinted in volar splint no PIP motion due to tendon repair and dislocation until cleared by orthopedics     IF and RF Tendon Repair (Partial Tear)  - cleared for gentle AROM  - Begin with short arc motion to avoid extensor lag  - PIP Flexion to 45 degrees progressing 10 degrees a week (With consideration to extensor lag)      Manuals 7/3 7/9 7/11 7/16 7/18 7/23 7/25 7/30 6/24 6/28   MEM (once sutures are removed) 5 5 5 5' 5   10 5 5   Scar massage (once sutures are removed) 5 5 5 5' 5' 8' IASTM dorsal fingers 8' IASTM dorsal fingers 10 5 5   Wound care             Re-evaluation Completed            ROM splint        Static progressive splint provided     Neuro Re-Ed                                                                                                        Ther Ex             HEP             Short arc motion 8' 8' 8' Full ROM as able Full ROM as able    70D IF and RF    50 MF 70D IF and RF    55 " MF   Gentle PROM all joints  8' 8' 8' 8' 8' 10' 10'     MF DIP and MCP motion 8' 8' 8' Blocked ROM all joints  Blocked ROM all joints, blocked hook with pencil Blocked ROM all joints, blocked hook with pencil 8' 8'    [place] opp All marbles all digits All marbles All marbles Marbles, sm colored pegs Colored pegs Colored pegs to IF/MF in, opp to RF out Colored pegs to IF/MF in, opp to RF out All marbles IF and RF All marbles IF,MF,RF All marbles all digits   Towel scruntch  x10 x10 x20 x20    x20 x20 x20   Cube pickup        MF x 20 D/c    Pencil grasps X1  x2 x2 2x 2x 2x 2x 2x  X1 14 pencils remaining                Ther Activity                                                                              Modalities             MHP 5' 5' 5' 5' 5' 5' 5' 5' 5' 5'

## 2024-07-31 ENCOUNTER — OFFICE VISIT (OUTPATIENT)
Dept: OBGYN CLINIC | Facility: CLINIC | Age: 89
End: 2024-07-31

## 2024-07-31 VITALS
DIASTOLIC BLOOD PRESSURE: 70 MMHG | SYSTOLIC BLOOD PRESSURE: 157 MMHG | HEART RATE: 58 BPM | BODY MASS INDEX: 27.99 KG/M2 | HEIGHT: 69 IN | WEIGHT: 189 LBS

## 2024-07-31 DIAGNOSIS — Z98.890 S/P MUSCULOSKELETAL SYSTEM SURGERY: Primary | ICD-10-CM

## 2024-07-31 DIAGNOSIS — S61.219D: ICD-10-CM

## 2024-07-31 DIAGNOSIS — S63.259A DISLOCATION OF FINGER, INITIAL ENCOUNTER: ICD-10-CM

## 2024-07-31 PROCEDURE — 99024 POSTOP FOLLOW-UP VISIT: CPT | Performed by: SURGERY

## 2024-08-01 ENCOUNTER — OFFICE VISIT (OUTPATIENT)
Dept: OCCUPATIONAL THERAPY | Facility: CLINIC | Age: 89
End: 2024-08-01
Payer: COMMERCIAL

## 2024-08-01 DIAGNOSIS — Z98.890 STATUS POST SURGERY: Primary | ICD-10-CM

## 2024-08-01 DIAGNOSIS — S60.512A ABRASION OF LEFT HAND, INITIAL ENCOUNTER: ICD-10-CM

## 2024-08-01 PROCEDURE — 97140 MANUAL THERAPY 1/> REGIONS: CPT

## 2024-08-01 PROCEDURE — 97110 THERAPEUTIC EXERCISES: CPT

## 2024-08-01 NOTE — PROGRESS NOTES
Daily Note     Today's date: 2024  Patient name: Bay Real  : 1935  MRN: [id number]  Referring provider: Alonzo Rodrigues MD  Dx:   Encounter Diagnosis     ICD-10-CM    1. Status post surgery  Z98.890       2. Abrasion of left hand, initial encounter  S60.512A                          Subjective: I think this is the best it's been (referring to finger flexion post-tx)      Objective: See treatment diary below      Assessment: Tolerated treatment well. Pt used the SPS yesterday and noticed increased finger flexion post. He had increased flexibility ith passive stretching, and post-tx.      Plan: Continue per plan of care.  Progress treatment as tolerated.       Precautions: S/P I&D of traumatic arthotomy of the left ring finger with partial tendon repair; I&D of left index finger with partial extensor tendon repair; I&D of left long finger proximal phalax fracture & traumatic arthotomy with extensor tendon reconstruction    LF Extensor Tendon Reconstruction and Fracture  -PIP Splinted in volar splint no PIP motion due to tendon repair and dislocation until cleared by orthopedics     IF and RF Tendon Repair (Partial Tear)  - cleared for gentle AROM  - Begin with short arc motion to avoid extensor lag  - PIP Flexion to 45 degrees progressing 10 degrees a week (With consideration to extensor lag)      Manuals 7/3 7/9 7/11 7/16 7/18 7/23 7/25 7/30 8/1 6/28   MEM (once sutures are removed) 5 5 5 5' 5   10  5   Scar massage (once sutures are removed) 5 5 5 5' 5' 8' IASTM dorsal fingers 8' IASTM dorsal fingers 10 10 IASTM 5   Wound care             Re-evaluation Completed            ROM splint        Static progressive splint provided     Neuro Re-Ed                                                                                                        Ther Ex             HEP             Short arc motion 8' 8' 8' Full ROM as able Full ROM as able     70D IF and RF    55 MF   Gentle PROM all joints  8'  8' 8' 8' 8' 10' 10' 10'    MF DIP and MCP motion 8' 8' 8' Blocked ROM all joints  Blocked ROM all joints, blocked hook with pencil Blocked ROM all joints, blocked hook with pencil 8' 8'    [place] opp All marbles all digits All marbles All marbles Marbles, sm colored pegs Colored pegs Colored pegs to IF/MF in, opp to RF out Colored pegs to IF/MF in, opp to RF out All marbles IF and RF All marbles IF,MF,RF    Finger add with marbles All marbles all digits   Towel scruntch  x10 x10 x20 x20    x20  x20   Cube pickup        MF x 20     Pencil grasps X1  x2 x2 2x 2x 2x 2x 2x 2x X1 14 pencils remaining   Finger flexion on dowels         Lg & med dowels 10s 10x each    Ther Activity                                                                              Modalities             MHP 5' 5' 5' 5' 5' 5' 5' 5' 5' 5'

## 2024-08-05 ENCOUNTER — RA CDI HCC (OUTPATIENT)
Dept: OTHER | Facility: HOSPITAL | Age: 89
End: 2024-08-05

## 2024-08-06 ENCOUNTER — OFFICE VISIT (OUTPATIENT)
Dept: OCCUPATIONAL THERAPY | Facility: CLINIC | Age: 89
End: 2024-08-06
Payer: COMMERCIAL

## 2024-08-06 DIAGNOSIS — S60.512A ABRASION OF LEFT HAND, INITIAL ENCOUNTER: ICD-10-CM

## 2024-08-06 DIAGNOSIS — Z98.890 STATUS POST SURGERY: Primary | ICD-10-CM

## 2024-08-06 PROCEDURE — 97140 MANUAL THERAPY 1/> REGIONS: CPT

## 2024-08-06 PROCEDURE — 97110 THERAPEUTIC EXERCISES: CPT

## 2024-08-06 NOTE — PROGRESS NOTES
Daily Note     Today's date: 2024  Patient name: Bay Real  : 1935  MRN: [id number]  Referring provider: Alonzo Rodrigues MD  Dx:   Encounter Diagnosis     ICD-10-CM    1. Status post surgery  Z98.890       2. Abrasion of left hand, initial encounter  S60.512A                          Subjective: I think the splint is helping.      Objective: See treatment diary below      Assessment: Tolerated treatment well. Pt showed improved finger flexion. He demonstrated ability to flex fingers closer to  the medium dowel, and was able to add marbles 3 at a time.      Plan: Continue per plan of care.  Progress treatment as tolerated.       Precautions: S/P I&D of traumatic arthotomy of the left ring finger with partial tendon repair; I&D of left index finger with partial extensor tendon repair; I&D of left long finger proximal phalax fracture & traumatic arthotomy with extensor tendon reconstruction    LF Extensor Tendon Reconstruction and Fracture  -PIP Splinted in volar splint no PIP motion due to tendon repair and dislocation until cleared by orthopedics     IF and RF Tendon Repair (Partial Tear)  - cleared for gentle AROM  - Begin with short arc motion to avoid extensor lag  - PIP Flexion to 45 degrees progressing 10 degrees a week (With consideration to extensor lag)      Manuals 7/3 7/9 7/11 7/16 7/18 7/23 7/25 7/30 8/1 8/6   MEM (once sutures are removed) 5 5 5 5' 5   10     Scar massage (once sutures are removed) 5 5 5 5' 5' 8' IASTM dorsal fingers 8' IASTM dorsal fingers 10 10 IASTM 10' IASTM   Wound care             Re-evaluation Completed            ROM splint        Static progressive splint provided     Neuro Re-Ed                                                                                                        Ther Ex             HEP             Short arc motion 8' 8' 8' Full ROM as able Full ROM as able        Gentle PROM all joints  8' 8' 8' 8' 8' 10' 10' 10' 10'   MF DIP and MCP  motion 8' 8' 8' Blocked ROM all joints  Blocked ROM all joints, blocked hook with pencil Blocked ROM all joints, blocked hook with pencil 8' 8' 8'   [place] opp All marbles all digits All marbles All marbles Marbles, sm colored pegs Colored pegs Colored pegs to IF/MF in, opp to RF out Colored pegs to IF/MF in, opp to RF out All marbles IF and RF All marbles IF,MF,RF    Finger add with marbles All marbles all digits    Finger add 3 at a time   Towel scruntch  x10 x10 x20 x20    x20     Cube pickup        MF x 20     Pencil grasps X1  x2 x2 2x 2x 2x 2x 2x 2x 2x   Finger flexion on dowels         Lg & med dowels 10s 10x each Lg & med dowels 10s 10x each   Ther Activity                                                                              Modalities             MHP 5' 5' 5' 5' 5' 5' 5' 5' 5' 5'

## 2024-08-08 ENCOUNTER — OFFICE VISIT (OUTPATIENT)
Dept: OCCUPATIONAL THERAPY | Facility: CLINIC | Age: 89
End: 2024-08-08
Payer: COMMERCIAL

## 2024-08-08 DIAGNOSIS — Z98.890 STATUS POST SURGERY: Primary | ICD-10-CM

## 2024-08-08 DIAGNOSIS — S60.512A ABRASION OF LEFT HAND, INITIAL ENCOUNTER: ICD-10-CM

## 2024-08-08 PROCEDURE — 97110 THERAPEUTIC EXERCISES: CPT

## 2024-08-08 PROCEDURE — 97140 MANUAL THERAPY 1/> REGIONS: CPT

## 2024-08-08 NOTE — PROGRESS NOTES
"Daily Note     Today's date: 2024  Patient name: Bay Real  : 1935  MRN: [id number]  Referring provider: Alonzo Rodrigues MD  Dx:   Encounter Diagnosis     ICD-10-CM    1. Status post surgery  Z98.890       2. Abrasion of left hand, initial encounter  S60.512A                          Subjective: I squeeze around the handle of my weight at home and that makes a very big difference\".       Objective: See treatment diary below  IF: 74, 68, 34  MF: 72, 54, 20  RF: 60, 60, 20    MF 7 cm from DPC    Assessment: Tolerated treatment well. Patient displays gains in AROM of all digits. Scar tissue is decreasing, but continues to be a limiting factor in digit AROM. Continued to progress PROM, which pain tolerates well.       Plan: Continue per plan of care.  Progress treatment as tolerated.       Precautions: S/P I&D of traumatic arthotomy of the left ring finger with partial tendon repair; I&D of left index finger with partial extensor tendon repair; I&D of left long finger proximal phalax fracture & traumatic arthotomy with extensor tendon reconstruction    LF Extensor Tendon Reconstruction and Fracture  -PIP Splinted in volar splint no PIP motion due to tendon repair and dislocation until cleared by orthopedics     IF and RF Tendon Repair (Partial Tear)  - cleared for gentle AROM  - Begin with short arc motion to avoid extensor lag  - PIP Flexion to 45 degrees progressing 10 degrees a week (With consideration to extensor lag)      Manuals    MEM (once sutures are removed)  5 5 5' 5   10     Scar massage (once sutures are removed) 10 5 5 5' 5' 8' IASTM dorsal fingers 8' IASTM dorsal fingers 10 10 IASTM 10' IASTM   Wound care             Re-evaluation             ROM splint        Static progressive splint provided     Neuro Re-Ed                                                                                                        Ther Ex             HEP     "         Short arc motion  8' 8' Full ROM as able Full ROM as able        Gentle PROM all joints 12' 8' 8' 8' 8' 8' 10' 10' 10' 10'   MF DIP and MCP motion 8' 8' 8' Blocked ROM all joints  Blocked ROM all joints, blocked hook with pencil Blocked ROM all joints, blocked hook with pencil 8' 8' 8'   [place] opp All marbles all digits    Finger add 3 at a time All marbles All marbles Marbles, sm colored pegs Colored pegs Colored pegs to IF/MF in, opp to RF out Colored pegs to IF/MF in, opp to RF out All marbles IF and RF All marbles IF,MF,RF    Finger add with marbles All marbles all digits    Finger add 3 at a time   Towel scruntch   x10 x20 x20    x20     Cube pickup        MF x 20     Pencil grasps 2x x2 x2 2x 2x 2x 2x 2x 2x 2x   Finger flexion on dowels Lg & med dowels 10s 10x each        Lg & med dowels 10s 10x each Lg & med dowels 10s 10x each   Ther Activity                                                                              Modalities             MHP 5' 5' 5' 5' 5' 5' 5' 5' 5' 5'

## 2024-08-09 ENCOUNTER — OFFICE VISIT (OUTPATIENT)
Age: 89
End: 2024-08-09
Payer: COMMERCIAL

## 2024-08-09 VITALS
WEIGHT: 190.2 LBS | OXYGEN SATURATION: 96 % | RESPIRATION RATE: 16 BRPM | HEIGHT: 69 IN | BODY MASS INDEX: 28.17 KG/M2 | HEART RATE: 74 BPM | SYSTOLIC BLOOD PRESSURE: 138 MMHG | DIASTOLIC BLOOD PRESSURE: 78 MMHG | TEMPERATURE: 96.4 F

## 2024-08-09 DIAGNOSIS — E78.2 MIXED HYPERLIPIDEMIA: ICD-10-CM

## 2024-08-09 DIAGNOSIS — I73.9 PAD (PERIPHERAL ARTERY DISEASE) (HCC): ICD-10-CM

## 2024-08-09 DIAGNOSIS — I10 ESSENTIAL HYPERTENSION: Primary | ICD-10-CM

## 2024-08-09 DIAGNOSIS — N18.31 STAGE 3A CHRONIC KIDNEY DISEASE (CKD) (HCC): ICD-10-CM

## 2024-08-09 PROCEDURE — G2211 COMPLEX E/M VISIT ADD ON: HCPCS

## 2024-08-09 PROCEDURE — 99213 OFFICE O/P EST LOW 20 MIN: CPT

## 2024-08-09 NOTE — PROGRESS NOTES
Assessment/Plan:         Problem List Items Addressed This Visit     Essential hypertension - Primary     Under control.  Continue lisinopril.  We will continue to monitor         Mixed hyperlipidemia     Under control.  Continue atorvastatin.  We will continue to monitor         Stage 3a chronic kidney disease (CKD) (HCC)     Lab Results   Component Value Date    EGFR 61 05/21/2024    EGFR 57 01/23/2024    EGFR 43 07/14/2023    CREATININE 1.07 05/21/2024    CREATININE 1.14 01/23/2024    CREATININE 1.44 (H) 07/14/2023   creatinine and GFR stable and improving  Will need periodic BMP  Avoid NSAIDs like ibuprofen Aleve Advil etc.  Avoid high potassium diet.  We will continue to monitor            PAD (peripheral artery disease) (HCC)     Under control.    Continue current medication.    We will re-evaluate at next office visit.                Subjective:      Patient ID: Bay Real is a 88 y.o. male.    Patient here for review of chronic medical problems and  the labs and imaging if it is applicable.  Currently has no specific complaints other than mentioned in the review of systems  Denies chest pain, SOB, cough, abdominal pain, nausea, vomiting, fever, chills, lightheadedness, dizziness,headache, tingling or numbness.No bowel or bladder problem.  Patient had a fall on May 21, 2024 and the images left hand extensor tendon had a surgery although report is reviewed currently also getting physical therapy and movement is slowly improving        The following portions of the patient's history were reviewed and updated as appropriate:   Past Medical History:  He has a past medical history of BPH with obstruction/lower urinary tract symptoms, Cataract, Diverticulitis, Diverticulosis, Dizziness, Ear problems, Elevated PSA, Hypercholesterolemia, Hypertension, Osteoarthritis, Poor urinary stream, and Vertigo.,  _______________________________________________________________________  Medical Problems:  does not have any  pertinent problems on file.,  _______________________________________________________________________  Past Surgical History:   has a past surgical history that includes Prostate biopsy (2005); Cataract extraction (Left); Knee surgery (Left, 2005); Rotator cuff repair (Right, 06/15/2015); Colonoscopy (02/16/2004); and pr rpr/advmnt flxr tdn n/z/2 w/o fr graft ea tendon (Left, 5/24/2024).,  _______________________________________________________________________  Family History:  family history includes Cancer in his father; Diabetes in his father and mother; Heart attack in his mother; Heart disease in his mother.,  _______________________________________________________________________  Social History:   reports that he has quit smoking. His smoking use included cigarettes. He has been exposed to tobacco smoke. He has never used smokeless tobacco. He reports current alcohol use of about 14.0 standard drinks of alcohol per week. He reports that he does not use drugs.,  _______________________________________________________________________  Allergies:  has No Known Allergies..  _______________________________________________________________________  Current Outpatient Medications   Medication Sig Dispense Refill   • aspirin 81 MG tablet Daily     • atorvastatin (LIPITOR) 40 mg tablet TAKE 1 TABLET BY MOUTH EVERYDAY AT BEDTIME 90 tablet 1   • Cholecalciferol (VITAMIN D3) 2000 units capsule Daily     • cycloSPORINE (Restasis) 0.05 % ophthalmic emulsion Restasis 0.05 % eye drops in a dropperette   PUT 1 DROP INTO BOTH EYES TWICE A DAY     • lisinopril (ZESTRIL) 10 mg tablet TAKE 1 TABLET BY MOUTH EVERY DAY 90 tablet 1   • meclizine (ANTIVERT) 25 mg tablet Take 1 tablet (25 mg total) by mouth 3 (three) times a day 90 tablet 0     No current facility-administered medications for this visit.     _______________________________________________________________________  Review of Systems   Constitutional:  Negative for  "chills, fatigue and fever.   HENT:  Positive for hearing loss (decreased both ears). Negative for congestion, ear pain (Discomfort both ears), rhinorrhea, sneezing and sore throat.    Eyes:  Positive for visual disturbance. Negative for redness and itching.   Respiratory:  Negative for cough, chest tightness and shortness of breath.    Cardiovascular:  Negative for chest pain, palpitations and leg swelling.   Gastrointestinal:  Negative for abdominal pain, blood in stool, diarrhea, nausea and vomiting.   Endocrine: Negative for cold intolerance and heat intolerance.   Genitourinary:  Negative for dysuria, frequency and urgency.   Musculoskeletal:  Positive for gait problem. Negative for arthralgias, back pain and myalgias.   Skin:  Negative for color change and rash.   Neurological:  Positive for dizziness. Negative for weakness, light-headedness, numbness and headaches.   Hematological:  Does not bruise/bleed easily.   Psychiatric/Behavioral:  Negative for agitation, behavioral problems and confusion.          Objective:  Vitals:    08/09/24 1155   BP: 138/78   BP Location: Left arm   Patient Position: Sitting   Cuff Size: Standard   Pulse: 74   Resp: 16   Temp: (!) 96.4 °F (35.8 °C)   TempSrc: Tympanic   SpO2: 96%   Weight: 86.3 kg (190 lb 3.2 oz)   Height: 5' 9\" (1.753 m)     Body mass index is 28.09 kg/m².     Physical Exam  Vitals and nursing note reviewed.   Constitutional:       General: He is not in acute distress.     Appearance: Normal appearance. He is not ill-appearing, toxic-appearing or diaphoretic.   HENT:      Head: Normocephalic and atraumatic.      Nose: Nose normal. No congestion.      Mouth/Throat:      Mouth: Mucous membranes are moist.      Pharynx: No oropharyngeal exudate or posterior oropharyngeal erythema.   Eyes:      General: No scleral icterus.        Right eye: No discharge.         Left eye: No discharge.      Extraocular Movements: Extraocular movements intact.      " Conjunctiva/sclera: Conjunctivae normal.      Pupils: Pupils are equal, round, and reactive to light.   Cardiovascular:      Rate and Rhythm: Normal rate and regular rhythm.      Pulses: Normal pulses.      Heart sounds: Normal heart sounds. No murmur heard.     No gallop.   Pulmonary:      Effort: Pulmonary effort is normal. No respiratory distress.      Breath sounds: Normal breath sounds. No wheezing, rhonchi or rales.   Abdominal:      General: Abdomen is flat. Bowel sounds are normal. There is no distension.      Palpations: Abdomen is soft.      Tenderness: There is no abdominal tenderness. There is no right CVA tenderness, left CVA tenderness or guarding.   Musculoskeletal:         General: No swelling or tenderness. Normal range of motion.      Cervical back: Normal range of motion and neck supple. No rigidity.      Right lower leg: No edema.      Left lower leg: No edema.   Lymphadenopathy:      Cervical: No cervical adenopathy.   Skin:     General: Skin is warm.      Capillary Refill: Capillary refill takes 2 to 3 seconds.      Coloration: Skin is not jaundiced.      Findings: No bruising or rash.   Neurological:      General: No focal deficit present.      Mental Status: He is alert and oriented to person, place, and time. Mental status is at baseline.      Sensory: No sensory deficit.      Motor: No weakness.      Gait: Gait abnormal (Ambulates with cane).   Psychiatric:         Mood and Affect: Mood normal.         Behavior: Behavior normal.

## 2024-08-09 NOTE — ASSESSMENT & PLAN NOTE
Lab Results   Component Value Date    EGFR 61 05/21/2024    EGFR 57 01/23/2024    EGFR 43 07/14/2023    CREATININE 1.07 05/21/2024    CREATININE 1.14 01/23/2024    CREATININE 1.44 (H) 07/14/2023   creatinine and GFR stable and improving  Will need periodic BMP  Avoid NSAIDs like ibuprofen Aleve Advil etc.  Avoid high potassium diet.  We will continue to monitor

## 2024-08-13 ENCOUNTER — OFFICE VISIT (OUTPATIENT)
Dept: OCCUPATIONAL THERAPY | Facility: CLINIC | Age: 89
End: 2024-08-13
Payer: COMMERCIAL

## 2024-08-13 DIAGNOSIS — Z98.890 STATUS POST SURGERY: Primary | ICD-10-CM

## 2024-08-13 DIAGNOSIS — S60.512A ABRASION OF LEFT HAND, INITIAL ENCOUNTER: ICD-10-CM

## 2024-08-13 PROCEDURE — 97140 MANUAL THERAPY 1/> REGIONS: CPT

## 2024-08-13 PROCEDURE — 97110 THERAPEUTIC EXERCISES: CPT

## 2024-08-13 NOTE — PROGRESS NOTES
Daily Note     Today's date: 2024  Patient name: Bay Real  : 1935  MRN: [id number]  Referring provider: Alonzo Rodrigues MD  Dx:   Encounter Diagnosis     ICD-10-CM    1. Status post surgery  Z98.890       2. Abrasion of left hand, initial encounter  S60.512A                          Subjective: It has gotten easy to  the railing at home. I am able to go up and down the stairs whenever now.       Objective: See treatment diary below      MF 6.8 cm from DPC    Assessment: Tolerated treatment well. Slight improvement in MF TOA. Patient gained 2 mm towards touching DPC with MF. Patient scar tissue is improving. ORL tightness continues to limit DIP motion. Instructed patient on ORL stretches for HEP.       Plan: Continue per plan of care.  Progress treatment as tolerated.       Precautions: S/P I&D of traumatic arthotomy of the left ring finger with partial tendon repair; I&D of left index finger with partial extensor tendon repair; I&D of left long finger proximal phalax fracture & traumatic arthotomy with extensor tendon reconstruction    LF Extensor Tendon Reconstruction and Fracture  -PIP Splinted in volar splint no PIP motion due to tendon repair and dislocation until cleared by orthopedics     IF and RF Tendon Repair (Partial Tear)  - cleared for gentle AROM  - Begin with short arc motion to avoid extensor lag  - PIP Flexion to 45 degrees progressing 10 degrees a week (With consideration to extensor lag)      Manuals    MEM (once sutures are removed)   5 5' 5   10     Scar massage (once sutures are removed) 10 10 5 5' 5' 8' IASTM dorsal fingers 8' IASTM dorsal fingers 10 10 IASTM 10' IASTM   Wound care             Re-evaluation             ROM splint        Static progressive splint provided     Neuro Re-Ed                                                                                                        Ther Ex             HEP              Short arc motion   8' Full ROM as able Full ROM as able        Gentle PROM all joints 12' 12 8' 8' 8' 8' 10' 10' 10' 10'   MF DIP and MCP motion 8' 8 8' Blocked ROM all joints  Blocked ROM all joints, blocked hook with pencil Blocked ROM all joints, blocked hook with pencil 8' 8' 8'   [place] opp All marbles all digits    Finger add 3 at a time All marbles all digits    Finger add 3 at a time All marbles Marbles, sm colored pegs Colored pegs Colored pegs to IF/MF in, opp to RF out Colored pegs to IF/MF in, opp to RF out All marbles IF and RF All marbles IF,MF,RF    Finger add with marbles All marbles all digits    Finger add 3 at a time   Towel scruntch    x20 x20    x20     Cube pickup        MF x 20     Pencil grasps 2x 2x x2 2x 2x 2x 2x 2x 2x 2x   Small colored pegs  X10 pegs           Finger flexion on dowels Lg & med dowels 10s 10x each Lg & med dowels 10s 10x each       Lg & med dowels 10s 10x each Lg & med dowels 10s 10x each   Ther Activity                                                                              Modalities             MHP 5' 5' 5' 5' 5' 5' 5' 5' 5' 5'

## 2024-08-15 ENCOUNTER — EVALUATION (OUTPATIENT)
Dept: OCCUPATIONAL THERAPY | Facility: CLINIC | Age: 89
End: 2024-08-15
Payer: COMMERCIAL

## 2024-08-15 DIAGNOSIS — Z98.890 STATUS POST SURGERY: Primary | ICD-10-CM

## 2024-08-15 DIAGNOSIS — S60.512A ABRASION OF LEFT HAND, INITIAL ENCOUNTER: ICD-10-CM

## 2024-08-15 PROCEDURE — 97110 THERAPEUTIC EXERCISES: CPT

## 2024-08-15 PROCEDURE — 97140 MANUAL THERAPY 1/> REGIONS: CPT

## 2024-08-15 NOTE — PROGRESS NOTES
OT Re-Evaluation     Today's date: 8/15/2024  Patient name: Bay Real  : 1935  MRN: [id number]  Referring provider: Alonzo Rodrigues MD  Dx:   Encounter Diagnosis     ICD-10-CM    1. Status post surgery  Z98.890       2. Abrasion of left hand, initial encounter  S60.512A                          Assessment  Impairments: abnormal coordination, abnormal or restricted ROM, activity intolerance, impaired physical strength and pain with function  Symptom irritability: moderate    Assessment details: (Initial Evaluation) Patient initially presented to skilled OP OT hand therapy services s/p I&D of traumatic arthotomy of the left ring finger with partial tendon repair, I&D of left index finger with partial extensor tendon repair; I&D of left long finger proximal phalax fracture & traumatic arthotomy with extensor tendon reconstruction. Patient surgery took place on 24. Patient initial injury occurred on 24 as the result of mechanical fall which occurred when pushing a heavy garbage can. Patient presented to ED with lacerations to multiple digits and a deformity of the PIP joint of the LF. X-rays displayed a PIP joint dislocation. Upon examination the extensor tendon of the LF was lacerated. Further extensor tendon injuries were discovered during surgical examination and subsequently repaired. Prior to initial measurements and evaluation patient was instructed on precautions. Patient was cleared by orthopedics for gentle motion of the entirety of the RF and IF at initial evaluation. The LF DIP and MCP joints are cleared for gentle motion but their PIP joint was to be immobilized in a volar splint at the PIP joint until their follow up with orthopedics. Therapist removed post operative dressing and fabricated a volar PIP blocking splint for the MF. Prior to patient leaving therapist re-applied sterile dressing and instructed patient on how to apply new following hygiene over the next few days.  Patient digit ROM was decreased as anticipated, but is within the outlined protocol. Edema measurements were deferred secondary to lacerations but is visibly swollen in the dorsal hand and all digits. Pain was reported to be mild to moderate in intensity.    (Re-evaluation 7/03/24) Patient presents to re-evaluation after consistently attending OP OT hand therapy services. Patient is progressing well and making appropriate gains in accordance to surgical protocol. Patient surgical incisions and lacerations have healed very well. Moderate scar tissue and general tightness does impact ROM in all digits, with the most limitation in the MF. Patient had most recent follow up with orthopedics on 6/19/24 where appropriate healing was noted. Orthopedics cleared patient to discontinue MF volar PIP blocking splint at this time. Patient digit flexion is improving incrementally each week as per protocol, but motion is still significantly limited in the three post operative digits. Extensor lag is improving. Patient reports moderate pain during general ROM. Edema is resolving. Therapist continued to defer resistive exercises and muscle testing until cleared by orthopedics. Patient would benefit from continued OP OT hand therapy services to regain full digit motion, to decrease scar tissue, and to assist patient with returning to WNL for all ADLs. See below for a more detailed assessment.     (Re-evaluation 8/15/24) Patient presents to second re-evaluation after consistently attending OP OT hand therapy services. Patient has their next follow up with orthopedics on 9/11/24. Patient is progressing well and making appropriate gains towards achieving established goals. Scar tissue in the digits has improved, but is still effecting motion, especially in the MF. Patient digit flexion is improving incrementally each week, but motion is still limited in the PIP and DIP joints. DIP joint ROM is most limited by ORL tightness. Wrist AROM  has remained WNL. Patient reports improvement in pain during general motion. Therapist measured  strength on this date and found it to be decreased due to disuse weakness and decreased ability to  the handle. Patient has been using a static progressive splint to increase digit AROM. Patient would benefit from continued OP OT hand therapy services to regain full digit motion, to improve functional  strength, and to continue to assist patient with returning to WNL for all ADLs. See below for a more detailed assessment.     Understanding of Dx/Px/POC: good     Prognosis: good    Goals  STG:    Patient will display increased PIP flexion by >10 degrees each week over the next 4 weeks- MET    Patient incisions will fully heal and sutures will be removed at subsequent orthopedics appointment- MET    Patient edema will decrease by 1-3 mm at the P1's of 2-4th digits in 4 weeks- MET    Patient will report compliance to established HEP all all subsequent visits- MET    LTG:    Patient will display digit AROM WFL in 8 weeks or discharge    Patient edema will be resolved in the entire hand in 8 weeks or discharge    New STG:     Patient will display an increased MELENDEZ of >30 degrees per digit in 4 weeks- Progressing    Patient will be cleared to begin gentle PROM to allow for continued gains in motion- MET    Patient will increase FOTO score by >20 points in 4 weeks    New STG (8/15/24)     Patient will increase  strength by >10 lbs in 4 weeks or discharge    Patient will report ability to perform all ADLs at home independently in 4 weeks or discharge               Plan  Patient would benefit from: custom splinting, skilled occupational therapy and OT eval  Referral necessary: No  Planned modality interventions: ultrasound, thermotherapy: hydrocollator packs and unattended electrical stimulation    Planned therapy interventions: IADL retraining, patient education, self care, manual therapy, orthotic  "fitting/training, strengthening, stretching, therapeutic activities, therapeutic exercise, home exercise program, functional ROM exercises and fine motor coordination training    Frequency: 2x week  Duration in weeks: 10  Plan of Care beginning date: 8/15/2024  Plan of Care expiration date: 10/15/2024  Treatment plan discussed with: patient  Plan details: Patient would benefit from continued skilled occupational therapy services 2 times per week for 8 weeks to return to prior level of function and achieve all of their established goals. Thank you for the referral.         Subjective Evaluation    History of Present Illness  Mechanism of injury: surgery and trauma  Mechanism of injury: Subjective:  \"I've been doing all the exercises everyday\". \"I started doing my full body workout again too\". \"I've been gripping a weight to work on getting my fingers around it, which I do quite a bit\". \"I've been able to hold a railing to get from the 1st level to the 2nd which I couldn't do\". \"I'm able to turn the sink off and on, I'm able to open the patio door which is pretty heavy\". \"Door handles are still very difficult for me, I can do it, but it is very hard\". \"I can cut my meat now and  bowls and plates\".           Recurrent probem    Quality of life: good    Patient Goals  Patient goals for therapy: decreased edema, decreased pain, increased motion and increased strength    Pain  Current pain ratin  At best pain ratin  At worst pain ratin  Location: L dorsal hand, 2nd, 3rd, and 4th digit  Quality: sharp  Relieving factors: rest, relaxation and support  Exacerbated by: General motion.  Progression: improved    Social Support    Employment status: not working  Hand dominance: right      Diagnostic Tests  X-ray: abnormal  Treatments  Previous treatment: immobilization  Current treatment: immobilization and occupational therapy    Objective     Observations     Right Wrist/Hand   Negative for abrasion and " incision.     Neurological Testing     Sensation     Wrist/Hand   Left   Intact: light touch    Right   Intact: light touch    Active Range of Motion     Right Digits   Flexion   Index     MCP: 76    PIP: 65    DIP: 32  Middle     MCP: 74    PIP: 50    DIP: 22  Ring     MCP: 70    PIP: 60    DIP: 30  Extension   Index     PIP: -10  Middle     PIP: -18  Ring     PIP: -4    Passive Range of Motion     Right Digits   Flexion   Index     MCP: 78    PIP: 70    DIP: 36  Middle     MCP: 80    PIP: 54    DIP: 22  Ring     MCP: 80    PIP: 66    DIP: 40    Strength/Myotome Testing     Left Wrist/Hand      (2nd hand position)     Trial 1: 12.3    Right Wrist/Hand      (2nd hand position)     Trial 1: 45    Swelling     Right Wrist/Hand   Index     Proximal: 6.7 cm  Middle     Proximal: 6.6 cm  Ring     Proximal: 6 cm               Precautions: S/P I&D of traumatic arthotomy of the left ring finger with partial tendon repair; I&D of left index finger with partial extensor tendon repair; I&D of left long finger proximal phalax fracture & traumatic arthotomy with extensor tendon reconstruction        Manuals 8/8 8/13 8/15 7/16 7/18 7/23 7/25 7/30 8/1 8/6   MEM (once sutures are removed)    5' 5   10     Scar massage (once sutures are removed) 10 10 10' 5' 5' 8' IASTM dorsal fingers 8' IASTM dorsal fingers 10 10 IASTM 10' IASTM   Wound care             Re-evaluation             ROM splint        Static progressive splint provided     Neuro Re-Ed                                                                                                        Ther Ex             HEP             Short arc motion    Full ROM as able Full ROM as able        Gentle PROM all joints 12' 12 12' 8' 8' 8' 10' 10' 10' 10'   MF DIP and MCP motion 8' 8 8' Blocked ROM all joints  Blocked ROM all joints, blocked hook with pencil Blocked ROM all joints, blocked hook with pencil 8' 8' 8'   [place] opp All marbles all digits    Finger add 3 at a time  All marbles all digits    Finger add 3 at a time All marbles all digits    Finger add 3 at a time Marbles, sm colored pegs Colored pegs Colored pegs to IF/MF in, opp to RF out Colored pegs to IF/MF in, opp to RF out All marbles IF and RF All marbles IF,MF,RF    Finger add with marbles All marbles all digits    Finger add 3 at a time   Towel scruntch     x20    x20     Cube pickup        MF x 20     Pencil grasps 2x 2x 2x 2x 2x 2x 2x 2x 2x 2x   Small colored pegs  X10 pegs X10 pegs          Finger flexion on dowels Lg & med dowels 10s 10x each Lg & med dowels 10s 10x each       Lg & med dowels 10s 10x each Lg & med dowels 10s 10x each   Ther Activity                                                                              Modalities             MHP 5' 5' 5' 5' 5' 5' 5' 5' 5' 5'

## 2024-08-20 ENCOUNTER — OFFICE VISIT (OUTPATIENT)
Dept: OCCUPATIONAL THERAPY | Facility: CLINIC | Age: 89
End: 2024-08-20
Payer: COMMERCIAL

## 2024-08-20 DIAGNOSIS — Z98.890 STATUS POST SURGERY: Primary | ICD-10-CM

## 2024-08-20 DIAGNOSIS — S60.512A ABRASION OF LEFT HAND, INITIAL ENCOUNTER: ICD-10-CM

## 2024-08-20 PROCEDURE — 97110 THERAPEUTIC EXERCISES: CPT

## 2024-08-20 PROCEDURE — 97140 MANUAL THERAPY 1/> REGIONS: CPT

## 2024-08-20 NOTE — PROGRESS NOTES
"Daily Note     Today's date: 2024  Patient name: Bay Real  : 1935  MRN: [id number]  Referring provider: Alonzo Rodrigues MD  Dx:   Encounter Diagnosis     ICD-10-CM    1. Status post surgery  Z98.890       2. Abrasion of left hand, initial encounter  S60.512A                      Subjective: \"I notice a big difference\".       Objective: See treatment diary below      Assessment: Tolerated treatment well. Patient was able to passively touch their IF to the palm on this date. Overall, active motion has significantly improved. Will continue to progress treatment 1x a week.       Plan: Continue per plan of care.  Progress treatment as tolerated.       Precautions: S/P I&D of traumatic arthotomy of the left ring finger with partial tendon repair; I&D of left index finger with partial extensor tendon repair; I&D of left long finger proximal phalax fracture & traumatic arthotomy with extensor tendon reconstruction        Manuals 8/8 8/13 8/15 8/20 7/18 7/23 7/25 7/30 8/1 8/6   MEM (once sutures are removed)     5   10     Scar massage (once sutures are removed) 10 10 10' 10' 5' 8' IASTM dorsal fingers 8' IASTM dorsal fingers 10 10 IASTM 10' IASTM   Wound care             Re-evaluation             ROM splint        Static progressive splint provided     Neuro Re-Ed                                                                                                        Ther Ex             HEP             Short arc motion     Full ROM as able        Gentle PROM all joints 12' 12 12' 12' 8' 8' 10' 10' 10' 10'   MF DIP and MCP motion 8' 8 8' 8'  Blocked ROM all joints, blocked hook with pencil Blocked ROM all joints, blocked hook with pencil 8' 8' 8'   [place] opp All marbles all digits    Finger add 3 at a time All marbles all digits    Finger add 3 at a time All marbles all digits    Finger add 3 at a time Finger add 3 at a time Colored pegs Colored pegs to IF/MF in, opp to RF out Colored pegs to IF/MF " in, opp to RF out All marbles IF and RF All marbles IF,MF,RF    Finger add with marbles All marbles all digits    Finger add 3 at a time   Towel scruntch         x20     Cube pickup        MF x 20     Pencil grasps 2x 2x 2x 2x 2x 2x 2x 2x 2x 2x   Small colored pegs  X10 pegs X10 pegs X10 pegs         Finger flexion on dowels Lg & med dowels 10s 10x each Lg & med dowels 10s 10x each       Lg & med dowels 10s 10x each Lg & med dowels 10s 10x each   Ther Activity                                                                              Modalities             MHP 5' 5' 5' 5' 5' 5' 5' 5' 5' 5'

## 2024-08-22 ENCOUNTER — APPOINTMENT (OUTPATIENT)
Dept: OCCUPATIONAL THERAPY | Facility: CLINIC | Age: 89
End: 2024-08-22
Payer: COMMERCIAL

## 2024-08-27 ENCOUNTER — OFFICE VISIT (OUTPATIENT)
Dept: OCCUPATIONAL THERAPY | Facility: CLINIC | Age: 89
End: 2024-08-27
Payer: COMMERCIAL

## 2024-08-27 DIAGNOSIS — Z98.890 STATUS POST SURGERY: Primary | ICD-10-CM

## 2024-08-27 DIAGNOSIS — S60.512A ABRASION OF LEFT HAND, INITIAL ENCOUNTER: ICD-10-CM

## 2024-08-27 PROCEDURE — 97140 MANUAL THERAPY 1/> REGIONS: CPT

## 2024-08-27 PROCEDURE — 97110 THERAPEUTIC EXERCISES: CPT

## 2024-08-27 NOTE — PROGRESS NOTES
Daily Note     Today's date: 2024  Patient name: Bay Real  : 1935  MRN: [id number]  Referring provider: Alonzo Rodrigues MD  Dx:   Encounter Diagnosis     ICD-10-CM    1. Status post surgery  Z98.890       2. Abrasion of left hand, initial encounter  S60.512A                      Subjective:I haven't been using the splint the past week, but I think I'm going to go back to it.      Objective: See treatment diary below      Assessment: Tolerated treatment well. Pt able to do light resistance which improved active grasp.      Plan: Continue per plan of care.  Progress treatment as tolerated.       Precautions: S/P I&D of traumatic arthotomy of the left ring finger with partial tendon repair; I&D of left index finger with partial extensor tendon repair; I&D of left long finger proximal phalax fracture & traumatic arthotomy with extensor tendon reconstruction        Manuals 8/8 8/13 8/15 8/20 8/27 7/23 7/25 7/30 8/1 8/6   MEM (once sutures are removed)        10     Scar massage (once sutures are removed) 10 10 10' 10' 10' 8' IASTM dorsal fingers 8' IASTM dorsal fingers 10 10 IASTM 10' IASTM   Wound care             Re-evaluation             ROM splint        Static progressive splint provided     Neuro Re-Ed                                                                                                        Ther Ex             HEP             Short arc motion             Gentle PROM all joints 12' 12 12' 12' 12' 8' 10' 10' 10' 10'   MF DIP and MCP motion 8' 8 8' 8' 8' Blocked ROM all joints, blocked hook with pencil Blocked ROM all joints, blocked hook with pencil 8' 8' 8'   [place] opp All marbles all digits    Finger add 3 at a time All marbles all digits    Finger add 3 at a time All marbles all digits    Finger add 3 at a time Finger add 3 at a time  Colored pegs to IF/MF in, opp to RF out Colored pegs to IF/MF in, opp to RF out All marbles IF and RF All marbles IF,MF,RF    Finger add with  marbles All marbles all digits    Finger add 3 at a time   Towel scruntch         x20     Cube pickup        MF x 20     Pencil grasps 2x 2x 2x 2x 2x 2x 2x 2x 2x 2x   Small colored pegs  X10 pegs X10 pegs X10 pegs         Finger flexion on dowels Lg & med dowels 10s 10x each Lg & med dowels 10s 10x each   Lg/med dowels into soft putty    Lg & med dowels 10s 10x each Lg & med dowels 10s 10x each   flexbar     RFB F/E/S/P 2x10        digifex     Comp Y 10x        Ther Activity             Key pegs     8'                                                            Modalities             MHP 5' 5' 5' 5' 5' 5' 5' 5' 5' 5'

## 2024-08-29 ENCOUNTER — APPOINTMENT (OUTPATIENT)
Dept: OCCUPATIONAL THERAPY | Facility: CLINIC | Age: 89
End: 2024-08-29
Payer: COMMERCIAL

## 2024-09-03 ENCOUNTER — OFFICE VISIT (OUTPATIENT)
Dept: OCCUPATIONAL THERAPY | Facility: CLINIC | Age: 89
End: 2024-09-03
Payer: COMMERCIAL

## 2024-09-03 DIAGNOSIS — Z98.890 STATUS POST SURGERY: Primary | ICD-10-CM

## 2024-09-03 DIAGNOSIS — S60.512A ABRASION OF LEFT HAND, INITIAL ENCOUNTER: ICD-10-CM

## 2024-09-03 PROCEDURE — 97110 THERAPEUTIC EXERCISES: CPT

## 2024-09-03 NOTE — PROGRESS NOTES
Daily Note     Today's date: 9/3/2024  Patient name: Bay Real  : 1935  MRN: [id number]  Referring provider: Alonzo Rodrigues MD  Dx:   Encounter Diagnosis     ICD-10-CM    1. Status post surgery  Z98.890       2. Abrasion of left hand, initial encounter  S60.512A                      Subjective:I haven't been using the splint the past week, but I think I'm going to go back to it.      Objective: See treatment diary below      Assessment: Tolerated treatment well. Pt able to do light resistance which improved active grasp.      Plan: Continue per plan of care.  Progress treatment as tolerated.       Precautions: S/P I&D of traumatic arthotomy of the left ring finger with partial tendon repair; I&D of left index finger with partial extensor tendon repair; I&D of left long finger proximal phalax fracture & traumatic arthotomy with extensor tendon reconstruction        Manuals 8/8 8/13 8/15 8/20 8/27 7/23 7/25 7/30 8/1 8/6   MEM (once sutures are removed)        10     Scar massage (once sutures are removed) 10 10 10' 10' 10' 8' IASTM dorsal fingers 8' IASTM dorsal fingers 10 10 IASTM 10' IASTM   Wound care             Re-evaluation             ROM splint        Static progressive splint provided     Neuro Re-Ed                                                                                                        Ther Ex             HEP             Short arc motion             Gentle PROM all joints 12' 12 12' 12' 12' 8' 10' 10' 10' 10'   MF DIP and MCP motion 8' 8 8' 8' 8' Blocked ROM all joints, blocked hook with pencil Blocked ROM all joints, blocked hook with pencil 8' 8' 8'   [place] opp All marbles all digits    Finger add 3 at a time All marbles all digits    Finger add 3 at a time All marbles all digits    Finger add 3 at a time Finger add 3 at a time  Colored pegs to IF/MF in, opp to RF out Colored pegs to IF/MF in, opp to RF out All marbles IF and RF All marbles IF,MF,RF    Finger add with  marbles All marbles all digits    Finger add 3 at a time   Towel scruntch         x20     Cube pickup        MF x 20     Pencil grasps 2x 2x 2x 2x 2x 2x 2x 2x 2x 2x   Small colored pegs  X10 pegs X10 pegs X10 pegs         Finger flexion on dowels Lg & med dowels 10s 10x each Lg & med dowels 10s 10x each   Lg/med dowels into soft putty    Lg & med dowels 10s 10x each Lg & med dowels 10s 10x each   flexbar     RFB F/E/S/P 2x10        digifex     Comp Y 10x        Ther Activity             Key pegs     8'                                                            Modalities             MHP 5' 5' 5' 5' 5' 5' 5' 5' 5' 5'

## 2024-09-05 ENCOUNTER — APPOINTMENT (OUTPATIENT)
Dept: OCCUPATIONAL THERAPY | Facility: CLINIC | Age: 89
End: 2024-09-05
Payer: COMMERCIAL

## 2024-09-10 ENCOUNTER — APPOINTMENT (OUTPATIENT)
Dept: OCCUPATIONAL THERAPY | Facility: CLINIC | Age: 89
End: 2024-09-10
Payer: COMMERCIAL

## 2024-09-11 ENCOUNTER — OFFICE VISIT (OUTPATIENT)
Dept: OBGYN CLINIC | Facility: CLINIC | Age: 89
End: 2024-09-11
Payer: COMMERCIAL

## 2024-09-11 VITALS
HEIGHT: 69 IN | SYSTOLIC BLOOD PRESSURE: 167 MMHG | BODY MASS INDEX: 28.14 KG/M2 | HEART RATE: 77 BPM | WEIGHT: 190 LBS | DIASTOLIC BLOOD PRESSURE: 76 MMHG

## 2024-09-11 DIAGNOSIS — Z98.890 S/P MUSCULOSKELETAL SYSTEM SURGERY: Primary | ICD-10-CM

## 2024-09-11 PROCEDURE — 99213 OFFICE O/P EST LOW 20 MIN: CPT | Performed by: SURGERY

## 2024-09-11 NOTE — PROGRESS NOTES
ASSESSMENT/PLAN:      88 year old male here for his Exploration of Left Index, Long, and Ring fingers; I&D of traumatic arthotomy of the left ring finger with partial tendon repair; I&D of left index finger with partial extensor tendon repair; I&D of left long finger proximal phalax fracture & traumatic arthotomy with extensor tendon reconstruction - Left, 5/24/2024.  It's recommend checking in the his OT 1x/month to be able to assess his progress with his home program. Encourage to use the heat prior to stretches, place and holds. Working on function movements like grasping and opening doors are easier.   Follow up in 3 months.     The patient verbalized understanding of exam findings and treatment plan. We engaged in the shared decision-making process and treatment options were discussed at length with the patient. Surgical and conservative management discussed today along with risks and benefits.    Diagnoses and all orders for this visit:    S/P musculoskeletal system surgery      Follow Up:  Return in about 3 months (around 12/11/2024) for left hand.      To Do Next Visit:  Re-evaluation of current issue    ____________________________________________________________________________________________________________________________________________      CHIEF COMPLAINT:  Chief Complaint   Patient presents with    Follow-up     Patient is doing well no complaints at this time       SUBJECTIVE:  Bay JAYE Real is a 88 y.o. year old RHD male who presents today for a 6 week follow up for his left hand. He is 3 1/2 months s/p Exploration of Left Index, Long, and Ring fingers; I&D of traumatic arthotomy of the left ring finger with partial tendon repair; I&D of left index finger with partial extensor tendon repair; I&D of left long finger proximal phalax fracture & traumatic arthotomy with extensor tendon reconstruction - Left, 5/24/2024.  He is happy with his progress with his therapist. He continues to work on motion  with warming it up.        I have personally reviewed all the relevant PMH, PSH, SH, FH, Medications and allergies.     PAST MEDICAL HISTORY:  Past Medical History:   Diagnosis Date    BPH with obstruction/lower urinary tract symptoms     Cataract     Diverticulitis     Diverticulosis     Dizziness     Ear problems     Elevated PSA     Hypercholesterolemia     Hypertension     Osteoarthritis     Poor urinary stream     Vertigo        PAST SURGICAL HISTORY:  Past Surgical History:   Procedure Laterality Date    CATARACT EXTRACTION Left     COLONOSCOPY  02/16/2004    KNEE SURGERY Left 2005    MI RPR/ADVMNT FLXR TDN N/Z/2 W/O FR GRAFT EA TENDON Left 5/24/2024    Procedure: Exploration of Left Index, Long, and Ring fingers; I&D of traumatic arthotomy of the left ring finger with partial tendon repair; I&D of left index finger with partial extensor tendon repair; I&D of left long finger proximal phalax fracture & traumatic arthotomy with extensor tendon reconstruction;  Surgeon: Alonzo Rodrigues MD;  Location: [facility];  Service: Orthopedics    PROSTATE BIOPSY  2005    ROTATOR CUFF REPAIR Right 06/15/2015       FAMILY HISTORY:  Family History   Problem Relation Age of Onset    Diabetes Mother     Heart disease Mother     Heart attack Mother     Cancer Father     Diabetes Father        SOCIAL HISTORY:  Social History     Tobacco Use    Smoking status: Former     Types: Cigarettes     Passive exposure: Past    Smokeless tobacco: Never   Vaping Use    Vaping status: Never Used   Substance Use Topics    Alcohol use: Yes     Alcohol/week: 14.0 standard drinks of alcohol     Types: 14 Cans of beer per week     Comment: 1-2 beers a day    Drug use: No       MEDICATIONS:    Current Outpatient Medications:     aspirin 81 MG tablet, Daily, Disp: , Rfl:     atorvastatin (LIPITOR) 40 mg tablet, TAKE 1 TABLET BY MOUTH EVERYDAY AT BEDTIME, Disp: 90 tablet, Rfl: 1    Cholecalciferol (VITAMIN D3) 2000 units capsule, Daily, Disp: ,  Rfl:     cycloSPORINE (Restasis) 0.05 % ophthalmic emulsion, Restasis 0.05 % eye drops in a dropperette  PUT 1 DROP INTO BOTH EYES TWICE A DAY, Disp: , Rfl:     lisinopril (ZESTRIL) 10 mg tablet, TAKE 1 TABLET BY MOUTH EVERY DAY, Disp: 90 tablet, Rfl: 1    meclizine (ANTIVERT) 25 mg tablet, Take 1 tablet (25 mg total) by mouth 3 (three) times a day, Disp: 90 tablet, Rfl: 0    ALLERGIES:  No Known Allergies    REVIEW OF SYSTEMS:  Review of Systems   Constitutional:  Negative for chills, fever and unexpected weight change.   HENT:  Negative for hearing loss, nosebleeds and sore throat.    Eyes:  Negative for pain, redness and visual disturbance.   Respiratory:  Negative for cough, shortness of breath and wheezing.    Cardiovascular:  Negative for chest pain, palpitations and leg swelling.   Gastrointestinal:  Negative for abdominal pain, nausea and vomiting.   Endocrine: Negative for polydipsia and polyuria.   Genitourinary:  Negative for dysuria and hematuria.   Skin:  Negative for rash and wound.   Neurological:  Negative for dizziness, light-headedness and headaches.   Psychiatric/Behavioral:  Negative for decreased concentration, dysphoric mood and suicidal ideas. The patient is not nervous/anxious.        VITALS:  Vitals:    09/11/24 1139   BP: 167/76   Pulse: 77         _____________________________________________________  PHYSICAL EXAMINATION:  General: well developed and well nourished, alert, oriented times 3, and appears comfortable  Psychiatric: Normal  HEENT: Normocephalic, Atraumatic Trachea Midline, No torticollis  Pulmonary: No audible wheezing or respiratory distress   Cardiovascular: No pitting edema, 2+ radial pulse   Abdominal/GI: abdomen non tender, non distended   Skin: No masses, erythema, lacerations, fluctation, ulcerations  Neurovascular: Sensation Intact to the Median, Ulnar, Radial Nerve, Motor Intact to the Median, Ulnar, Radial Nerve, and Pulses Intact  Musculoskeletal: Normal, except as  noted in detailed exam and in HPI.      MUSCULOSKELETAL EXAMINATION:    Left hand:     Well healed surgical incision PIP of long finger  +4 DPC of long finger  +3 DPC of ring finger  +2 DPC of small finger  Opposition intact  Sensation intact distally  Brisk capillary refill 2+  ___________________________________________________    STUDIES REVIEWED:  No images reviewed at today's visit    LABS REVIEWED:    HgA1c:   Lab Results   Component Value Date    HGBA1C 5.9 (H) 09/02/2021     BMP:   Lab Results   Component Value Date    CALCIUM 8.7 05/21/2024    K 4.2 05/21/2024    CO2 23 05/21/2024     05/21/2024    BUN 18 05/21/2024    CREATININE 1.07 05/21/2024         PROCEDURES PERFORMED:  Procedures  No Procedures performed today    _____________________________________________________      Scribe Attestation      I,:  Jaqui Nogueira am acting as a scribe while in the presence of the attending physician.:       I,:  Alonzo Rodrigues MD personally performed the services described in this documentation    as scribed in my presence.:

## 2024-09-26 DIAGNOSIS — I65.29 CAROTID STENOSIS: ICD-10-CM

## 2024-09-26 RX ORDER — ATORVASTATIN CALCIUM 40 MG/1
TABLET, FILM COATED ORAL
Qty: 90 TABLET | Refills: 1 | Status: SHIPPED | OUTPATIENT
Start: 2024-09-26

## 2024-10-15 ENCOUNTER — EVALUATION (OUTPATIENT)
Dept: OCCUPATIONAL THERAPY | Facility: CLINIC | Age: 89
End: 2024-10-15
Payer: COMMERCIAL

## 2024-10-15 DIAGNOSIS — Z98.890 STATUS POST SURGERY: Primary | ICD-10-CM

## 2024-10-15 PROCEDURE — 97110 THERAPEUTIC EXERCISES: CPT

## 2024-10-15 NOTE — PROGRESS NOTES
OT Re-Evaluation     Today's date: 10/15/2024  Patient name: Bay Real  : 1935  MRN: [id number]  Referring provider: Alonzo Rodrigues MD  Dx:   Encounter Diagnosis     ICD-10-CM    1. Status post surgery  Z98.890                            Assessment  Impairments: abnormal coordination, abnormal or restricted ROM, activity intolerance, impaired physical strength and pain with function  Symptom irritability: moderate    Assessment details: (Initial Evaluation) Patient initially presented to skilled OP OT hand therapy services s/p I&D of traumatic arthotomy of the left ring finger with partial tendon repair, I&D of left index finger with partial extensor tendon repair; I&D of left long finger proximal phalax fracture & traumatic arthotomy with extensor tendon reconstruction. Patient surgery took place on 24. Patient initial injury occurred on 24 as the result of mechanical fall which occurred when pushing a heavy garbage can. Patient presented to ED with lacerations to multiple digits and a deformity of the PIP joint of the LF. X-rays displayed a PIP joint dislocation. Upon examination the extensor tendon of the LF was lacerated. Further extensor tendon injuries were discovered during surgical examination and subsequently repaired. Prior to initial measurements and evaluation patient was instructed on precautions. Patient was cleared by orthopedics for gentle motion of the entirety of the RF and IF at initial evaluation. The LF DIP and MCP joints are cleared for gentle motion but their PIP joint was to be immobilized in a volar splint at the PIP joint until their follow up with orthopedics. Therapist removed post operative dressing and fabricated a volar PIP blocking splint for the MF. Prior to patient leaving therapist re-applied sterile dressing and instructed patient on how to apply new following hygiene over the next few days. Patient digit ROM was decreased as anticipated, but is  within the outlined protocol. Edema measurements were deferred secondary to lacerations but is visibly swollen in the dorsal hand and all digits. Pain was reported to be mild to moderate in intensity.    (Re-evaluation 7/03/24) Patient presents to re-evaluation after consistently attending OP OT hand therapy services. Patient is progressing well and making appropriate gains in accordance to surgical protocol. Patient surgical incisions and lacerations have healed very well. Moderate scar tissue and general tightness does impact ROM in all digits, with the most limitation in the MF. Patient had most recent follow up with orthopedics on 6/19/24 where appropriate healing was noted. Orthopedics cleared patient to discontinue MF volar PIP blocking splint at this time. Patient digit flexion is improving incrementally each week as per protocol, but motion is still significantly limited in the three post operative digits. Extensor lag is improving. Patient reports moderate pain during general ROM. Edema is resolving. Therapist continued to defer resistive exercises and muscle testing until cleared by orthopedics. Patient would benefit from continued OP OT hand therapy services to regain full digit motion, to decrease scar tissue, and to assist patient with returning to WNL for all ADLs. See below for a more detailed assessment.     (Re-evaluation 8/15/24) Patient presents to second re-evaluation after consistently attending OP OT hand therapy services. Patient has their next follow up with orthopedics on 9/11/24. Patient is progressing well and making appropriate gains towards achieving established goals. Scar tissue in the digits has improved, but is still effecting motion, especially in the MF. Patient digit flexion is improving incrementally each week, but motion is still limited in the PIP and DIP joints. DIP joint ROM is most limited by ORL tightness. Wrist AROM has remained WNL. Patient reports improvement in pain  during general motion. Therapist measured  strength on this date and found it to be decreased due to disuse weakness and decreased ability to  the handle. Patient has been using a static progressive splint to increase digit AROM. Patient would benefit from continued OP OT hand therapy services to regain full digit motion, to improve functional  strength, and to continue to assist patient with returning to WNL for all ADLs. See below for a more detailed assessment.     (Re-evaluation 10/15/24) Patient presents to third re-evaluation after taking a short break from OP OT hand therapy. Orthopedics recommended the patient check back in once a month to insure appropriate gains in digit AROM with their HEP. Patient has their next follow up with orthopedics on 12/18/24. Patient has progressed well and making appropriate gains towards achieving established goals. Scar tissue and general tightness in the post operative digits has significantly improved. Patient digit flexion improved in every joint of the 2nd, 3rd, and 4th digits. Patient PIP extension remains unchanged. DIP joint ROM continues to be most limited by ORL tightness. Wrist AROM has remained WNL. Patient reports resolution of pain outside of PROM. Therapist measured  strength on this date and found it to be increased as compared to prior measurements. Therapist provided patient with an update HEP to include putty resistive exercises. Patient would benefit from continued OP OT hand therapy services to regain full digit motion, to improve functional  strength, and to continue to assist patient with returning to WNL for all ADLs. Patient scheduled a follow up in 1 month to access ROM and strength. See below for a more detailed assessment.   Understanding of Dx/Px/POC: good     Prognosis: good    Goals  STG:    Patient will display increased PIP flexion by >10 degrees each week over the next 4 weeks- MET    Patient incisions will fully heal and  "sutures will be removed at subsequent orthopedics appointment- MET    Patient edema will decrease by 1-3 mm at the P1's of 2-4th digits in 4 weeks- MET    Patient will report compliance to established HEP all all subsequent visits- MET    LTG:    Patient will display digit AROM WFL in 8 weeks or discharge    Patient edema will be resolved in the entire hand in 8 weeks or discharge    New STG:     Patient will display an increased MELENDEZ of >30 degrees per digit in 4 weeks- Progressing    Patient will be cleared to begin gentle PROM to allow for continued gains in motion- MET    Patient will increase FOTO score by >20 points in 4 weeks    New STG (8/15/24)     Patient will increase  strength by >10 lbs in 4 weeks or discharge- MET    Patient will report ability to perform all ADLs at home independently in 4 weeks or discharge- MET    New STG: (10/15/24)    Patient will display an increased MELENDEZ of >20 degrees per digit in 4 weeks    Patient will increase  strength by >5 lbs in 4 weeks              Plan  Patient would benefit from: custom splinting, skilled occupational therapy and OT eval  Referral necessary: No  Planned modality interventions: ultrasound, thermotherapy: hydrocollator packs and unattended electrical stimulation    Planned therapy interventions: IADL retraining, patient education, self care, manual therapy, orthotic fitting/training, strengthening, stretching, therapeutic activities, therapeutic exercise, home exercise program, functional ROM exercises and fine motor coordination training    Frequency: 2x week  Duration in weeks: 10  Plan of Care beginning date: 8/15/2024  Plan of Care expiration date: 10/15/2024  Treatment plan discussed with: patient        Subjective Evaluation    History of Present Illness  Mechanism of injury: surgery and trauma  Mechanism of injury: Subjective:  \"It has been ok\". I've been mostly stretching it and counting to 10\". \"I do the stretches pretty often, everyday\". " "\"I can do pretty much everything at home\". \"The strength isn't there\". \"I have to be very careful when I pick things up because I'll drop it\". \"I'm good with the railing and my cane now\".  \"I only have pain when I really stretch it\".           Recurrent probem    Quality of life: good    Patient Goals  Patient goals for therapy: increased motion and increased strength    Pain  Current pain ratin  At best pain ratin  At worst pain ratin  Location: L dorsal hand, 2nd, 3rd, and 4th digit  Quality: sharp  Relieving factors: rest, relaxation and support  Exacerbated by: General motion.  Progression: improved    Social Support    Employment status: not working  Hand dominance: right    Treatments  Previous treatment: immobilization and occupational therapy  Current treatment: occupational therapy      Objective     Observations     Right Wrist/Hand   Negative for abrasion and incision.     Neurological Testing     Sensation     Wrist/Hand   Left   Intact: light touch    Right   Intact: light touch    Active Range of Motion     Right Digits   Flexion   Index     MCP: 76    PIP: 74    DIP: 32  Middle     MCP: 80    PIP: 62    DIP: 28  Ring     MCP: 78    PIP: 62    DIP: 38  Extension   Index     PIP: -10  Middle     PIP: -12  Ring     PIP: -4    Passive Range of Motion     Right Digits   Flexion   Index     MCP: 88    PIP: 88    DIP: 36  Middle     MCP: 84    PIP: 64    DIP: 32  Ring     MCP: 84    PIP: 70    DIP: 48    Strength/Myotome Testing     Left Wrist/Hand      (2nd hand position)     Trial 1: 19.1    Right Wrist/Hand      (2nd hand position)     Trial 1: 45    Swelling     Right Wrist/Hand   Index     Proximal: 6.7 cm  Middle     Proximal: 6.6 cm  Ring     Proximal: 6 cm               Precautions: S/P I&D of traumatic arthotomy of the left ring finger with partial tendon repair; I&D of left index finger with partial extensor tendon repair; I&D of left long finger proximal phalax fracture & " traumatic arthotomy with extensor tendon reconstruction        Manuals 8/8 8/13 8/15 8/20 8/27 10/15 7/25 7/30 8/1 8/6   MEM (once sutures are removed)        10     Scar massage (once sutures are removed) 10 10 10' 10' 10'  8' IASTM dorsal fingers 10 10 IASTM 10' IASTM   Wound care             Re-evaluation      Completed       ROM splint        Static progressive splint provided     Neuro Re-Ed                                                                                                        Ther Ex             HEP             Short arc motion             Gentle PROM all joints 12' 12 12' 12' 12' 12' 10' 10' 10' 10'   MF DIP and MCP motion 8' 8 8' 8' 8' 8' Blocked ROM all joints, blocked hook with pencil 8' 8' 8'   [place] opp All marbles all digits    Finger add 3 at a time All marbles all digits    Finger add 3 at a time All marbles all digits    Finger add 3 at a time Finger add 3 at a time   Colored pegs to IF/MF in, opp to RF out All marbles IF and RF All marbles IF,MF,RF    Finger add with marbles All marbles all digits    Finger add 3 at a time   Towel scruntch         x20     Cube pickup        MF x 20     Pencil grasps 2x 2x 2x 2x 2x 2x 2x 2x 2x 2x   Small colored pegs  X10 pegs X10 pegs X10 pegs         Finger flexion on dowels Lg & med dowels 10s 10x each Lg & med dowels 10s 10x each   Lg/med dowels into soft putty Lg/med dowels into soft putty   Lg & med dowels 10s 10x each Lg & med dowels 10s 10x each   flexbar     RFB F/E/S/P 2x10 RFB F/E/S/P 2x10       digifex     Comp Y 10x Comp Y 10x       Ther Activity             Key pegs     8' x1                                                           Modalities             MHP 5' 5' 5' 5' 5' 5' 5' 5' 5' 5'

## 2024-11-12 ENCOUNTER — APPOINTMENT (OUTPATIENT)
Dept: OCCUPATIONAL THERAPY | Facility: CLINIC | Age: 89
End: 2024-11-12
Payer: COMMERCIAL

## 2024-11-19 ENCOUNTER — OFFICE VISIT (OUTPATIENT)
Dept: OCCUPATIONAL THERAPY | Facility: CLINIC | Age: 89
End: 2024-11-19
Payer: COMMERCIAL

## 2024-11-19 DIAGNOSIS — Z98.890 STATUS POST SURGERY: Primary | ICD-10-CM

## 2024-11-19 PROCEDURE — 97110 THERAPEUTIC EXERCISES: CPT

## 2024-11-19 NOTE — PROGRESS NOTES
"Daily Note     Today's date: 2024  Patient name: Bya Real  : 1935  MRN: [id number]  Referring provider: Alonzo Rodrigues MD  Dx:   Encounter Diagnosis     ICD-10-CM    1. Status post surgery  Z98.890                      Subjective: \"I can open door knobs now\". \"I am so pleased with the motion\".       Objective: See treatment diary below      Assessment: Tolerated treatment well. Patient displayed significant improvement in digit motion. Patient is 1.5 cm from touching the fingers to the palm. Patient is passively able to touch the IF and RF to the palm of the hand.     Plan: Progress treatment as tolerated.       Precautions: S/P I&D of traumatic arthotomy of the left ring finger with partial tendon repair; I&D of left index finger with partial extensor tendon repair; I&D of left long finger proximal phalax fracture & traumatic arthotomy with extensor tendon reconstruction        Manuals 8/8 8/13 8/15 8/20 8/27 10/15 11/19 7/30 8/1 8/6   MEM (once sutures are removed)        10     Scar massage (once sutures are removed) 10 10 10' 10' 10'  10' 10 10 IASTM 10' IASTM   Wound care             Re-evaluation      Completed       ROM splint        Static progressive splint provided     Neuro Re-Ed                                                                                                        Ther Ex             HEP             Short arc motion             Gentle PROM all joints 12' 12 12' 12' 12' 12' 12' 10' 10' 10'   MF DIP and MCP motion 8' 8 8' 8' 8' 8' 8' 8' 8' 8'   [place] opp All marbles all digits    Finger add 3 at a time All marbles all digits    Finger add 3 at a time All marbles all digits    Finger add 3 at a time Finger add 3 at a time    All marbles IF and RF All marbles IF,MF,RF    Finger add with marbles All marbles all digits    Finger add 3 at a time   Towel scruntch         x20     Cube pickup        MF x 20     Pencil grasps 2x 2x 2x 2x 2x 2x  2x 2x 2x   Small colored " pegs  X10 pegs X10 pegs X10 pegs         Finger flexion on dowels Lg & med dowels 10s 10x each Lg & med dowels 10s 10x each   Lg/med dowels into soft putty Lg/med dowels into soft putty   Lg & med dowels 10s 10x each Lg & med dowels 10s 10x each   flexbar     RFB F/E/S/P 2x10 RFB F/E/S/P 2x10 RFB x 20      Ext web       Y x 20      Power web       R x 20      digifex     Comp Y 10x Comp Y 10x Comp R iso x10    G comp x20      Ther Activity             Key pegs     8' x1                                                           Modalities             MHP 5' 5' 5' 5' 5' 5' 5' 5' 5' 5'

## 2024-11-20 ENCOUNTER — OFFICE VISIT (OUTPATIENT)
Age: 89
End: 2024-11-20
Payer: COMMERCIAL

## 2024-11-20 VITALS
HEART RATE: 71 BPM | RESPIRATION RATE: 16 BRPM | WEIGHT: 191.2 LBS | SYSTOLIC BLOOD PRESSURE: 124 MMHG | OXYGEN SATURATION: 98 % | BODY MASS INDEX: 28.32 KG/M2 | HEIGHT: 69 IN | DIASTOLIC BLOOD PRESSURE: 80 MMHG | TEMPERATURE: 97.6 F

## 2024-11-20 DIAGNOSIS — E78.2 MIXED HYPERLIPIDEMIA: ICD-10-CM

## 2024-11-20 DIAGNOSIS — Z23 NEED FOR COVID-19 VACCINE: ICD-10-CM

## 2024-11-20 DIAGNOSIS — E55.9 VITAMIN D DEFICIENCY: ICD-10-CM

## 2024-11-20 DIAGNOSIS — I73.9 PAD (PERIPHERAL ARTERY DISEASE) (HCC): ICD-10-CM

## 2024-11-20 DIAGNOSIS — Z23 ENCOUNTER FOR IMMUNIZATION: Primary | ICD-10-CM

## 2024-11-20 DIAGNOSIS — N18.31 STAGE 3A CHRONIC KIDNEY DISEASE (CKD) (HCC): ICD-10-CM

## 2024-11-20 DIAGNOSIS — I10 ESSENTIAL HYPERTENSION: ICD-10-CM

## 2024-11-20 PROCEDURE — 90480 ADMN SARSCOV2 VAC 1/ONLY CMP: CPT

## 2024-11-20 PROCEDURE — G0008 ADMIN INFLUENZA VIRUS VAC: HCPCS

## 2024-11-20 PROCEDURE — 90662 IIV NO PRSV INCREASED AG IM: CPT

## 2024-11-20 PROCEDURE — 91320 SARSCV2 VAC 30MCG TRS-SUC IM: CPT

## 2024-11-20 PROCEDURE — 99214 OFFICE O/P EST MOD 30 MIN: CPT

## 2024-11-20 NOTE — ASSESSMENT & PLAN NOTE
Lab Results   Component Value Date    EGFR 61 05/21/2024    EGFR 57 01/23/2024    EGFR 43 07/14/2023    CREATININE 1.07 05/21/2024    CREATININE 1.14 01/23/2024    CREATININE 1.44 (H) 07/14/2023   creatinine and GFR stable and improving  Will need periodic BMP  Avoid NSAIDs like ibuprofen Aleve Advil etc.  Avoid high potassium diet.  We will continue to monitor            
Under control.    Continue current medication.    We will re-evaluate at next office visit.           
Under control.  Continue atorvastatin.  We will continue to monitor    Orders:    Lipid Panel with Direct LDL reflex; Future    
Under control.  Continue lisinopril.  We will continue to monitor    Orders:    CBC and differential; Future    Comprehensive metabolic panel; Future    Urinalysis with microscopic    
no

## 2024-11-20 NOTE — PROGRESS NOTES
Name: Bay Real      : 1935      MRN: [id number]  Encounter Provider: Christopher Roy MD  Encounter Date: 2024   Encounter department: [facility] PRIMARY CARE  :  Assessment & Plan  Essential hypertension  Under control.  Continue lisinopril.  We will continue to monitor    Orders:    CBC and differential; Future    Comprehensive metabolic panel; Future    Urinalysis with microscopic    Mixed hyperlipidemia  Under control.  Continue atorvastatin.  We will continue to monitor    Orders:    Lipid Panel with Direct LDL reflex; Future    Stage 3a chronic kidney disease (CKD) (HCC)  Lab Results   Component Value Date    EGFR 61 2024    EGFR 57 2024    EGFR 43 2023    CREATININE 1.07 2024    CREATININE 1.14 2024    CREATININE 1.44 (H) 2023   creatinine and GFR stable and improving  Will need periodic BMP  Avoid NSAIDs like ibuprofen Aleve Advil etc.  Avoid high potassium diet.  We will continue to monitor            PAD (peripheral artery disease) ([facility])  Under control.    Continue current medication.    We will re-evaluate at next office visit.           Encounter for immunization    Orders:    Fluzone High-Dose 0.5 mL IM    Vitamin D deficiency    Orders:    Vitamin D 25 hydroxy; Future          Depression Screening and Follow-up Plan: Patient was screened for depression during today's encounter. They screened negative with a PHQ-2 score of 0.    Falls Plan of Care: balance, strength, and gait training instructions were provided.       History of Present Illness     Patient here for review of chronic medical problems and  the labs and imaging if it is applicable.  Currently has no specific complaints other than mentioned in the review of systems  Denies chest pain, SOB, cough, abdominal pain, nausea, vomiting, fever, chills, lightheadedness, dizziness,headache, tingling or numbness.No bowel or bladder problem.        Review of Systems    Constitutional:  Negative for chills, fatigue and fever.   HENT:  Positive for hearing loss (decreased both ears). Negative for congestion, ear pain (Discomfort both ears), rhinorrhea, sneezing and sore throat.    Eyes:  Positive for visual disturbance. Negative for redness and itching.   Respiratory:  Negative for cough, chest tightness and shortness of breath.    Cardiovascular:  Negative for chest pain, palpitations and leg swelling.   Gastrointestinal:  Negative for abdominal pain, blood in stool, diarrhea, nausea and vomiting.   Endocrine: Negative for cold intolerance and heat intolerance.   Genitourinary:  Negative for dysuria, frequency and urgency.   Musculoskeletal:  Positive for gait problem. Negative for arthralgias, back pain and myalgias.   Skin:  Negative for color change and rash.   Neurological:  Positive for dizziness. Negative for weakness, light-headedness, numbness and headaches.   Hematological:  Does not bruise/bleed easily.   Psychiatric/Behavioral:  Negative for agitation, behavioral problems and confusion.      Medical History Reviewed by provider this encounter:  Tobacco  Allergies  Meds  Problems  Med Hx  Surg Hx  Fam Hx     .  Past Medical History   Past Medical History:   Diagnosis Date    BPH with obstruction/lower urinary tract symptoms     Cataract     Diverticulitis     Diverticulosis     Dizziness     Ear problems     Elevated PSA     Hypercholesterolemia     Hypertension     Osteoarthritis     Poor urinary stream     Vertigo      Past Surgical History:   Procedure Laterality Date    CATARACT EXTRACTION Left     COLONOSCOPY  02/16/2004    KNEE SURGERY Left 2005    FL RPR/ADVMNT FLXR TDN N/Z/2 W/O FR GRAFT EA TENDON Left 5/24/2024    Procedure: Exploration of Left Index, Long, and Ring fingers; I&D of traumatic arthotomy of the left ring finger with partial tendon repair; I&D of left index finger with partial extensor tendon repair; I&D of left long finger proximal phalax  fracture & traumatic arthotomy with extensor tendon reconstruction;  Surgeon: Alonzo Rodrigues MD;  Location: [facility];  Service: Orthopedics    PROSTATE BIOPSY  2005    ROTATOR CUFF REPAIR Right 06/15/2015     Family History   Problem Relation Age of Onset    Diabetes Mother     Heart disease Mother     Heart attack Mother     Cancer Father     Diabetes Father       reports that he has quit smoking. His smoking use included cigarettes. He has been exposed to tobacco smoke. He has never used smokeless tobacco. He reports current alcohol use of about 14.0 standard drinks of alcohol per week. He reports that he does not use drugs.  Current Outpatient Medications on File Prior to Visit   Medication Sig Dispense Refill    aspirin 81 MG tablet Daily      atorvastatin (LIPITOR) 40 mg tablet TAKE 1 TABLET BY MOUTH EVERYDAY AT BEDTIME 90 tablet 1    Cholecalciferol (VITAMIN D3) 2000 units capsule Daily      lisinopril (ZESTRIL) 10 mg tablet TAKE 1 TABLET BY MOUTH EVERY DAY 90 tablet 1    meclizine (ANTIVERT) 25 mg tablet Take 1 tablet (25 mg total) by mouth 3 (three) times a day 90 tablet 0    cycloSPORINE (Restasis) 0.05 % ophthalmic emulsion Restasis 0.05 % eye drops in a dropperette   PUT 1 DROP INTO BOTH EYES TWICE A DAY       No current facility-administered medications on file prior to visit.   No Known Allergies   Current Outpatient Medications on File Prior to Visit   Medication Sig Dispense Refill    aspirin 81 MG tablet Daily      atorvastatin (LIPITOR) 40 mg tablet TAKE 1 TABLET BY MOUTH EVERYDAY AT BEDTIME 90 tablet 1    Cholecalciferol (VITAMIN D3) 2000 units capsule Daily      lisinopril (ZESTRIL) 10 mg tablet TAKE 1 TABLET BY MOUTH EVERY DAY 90 tablet 1    meclizine (ANTIVERT) 25 mg tablet Take 1 tablet (25 mg total) by mouth 3 (three) times a day 90 tablet 0    cycloSPORINE (Restasis) 0.05 % ophthalmic emulsion Restasis 0.05 % eye drops in a dropperette   PUT 1 DROP INTO BOTH EYES TWICE A DAY       No  "current facility-administered medications on file prior to visit.      Social History     Tobacco Use    Smoking status: Former     Types: Cigarettes     Passive exposure: Past    Smokeless tobacco: Never   Vaping Use    Vaping status: Never Used   Substance and Sexual Activity    Alcohol use: Yes     Alcohol/week: 14.0 standard drinks of alcohol     Types: 14 Cans of beer per week     Comment: 1-2 beers a day    Drug use: No    Sexual activity: Not Currently        Objective   /80 (BP Location: Left arm, Patient Position: Sitting, Cuff Size: Standard)   Pulse 71   Temp 97.6 °F (36.4 °C) (Tympanic)   Resp 16   Ht 5' 9\" (1.753 m)   Wt 86.7 kg (191 lb 3.2 oz)   SpO2 98%   BMI 28.24 kg/m²      Physical Exam  Vitals and nursing note reviewed.   Constitutional:       General: He is not in acute distress.     Appearance: Normal appearance. He is well-developed and normal weight. He is not ill-appearing, toxic-appearing or diaphoretic.   HENT:      Head: Normocephalic and atraumatic.      Nose: Nose normal. No congestion or rhinorrhea.      Mouth/Throat:      Mouth: Mucous membranes are moist.      Pharynx: Oropharynx is clear. No posterior oropharyngeal erythema.   Eyes:      General: No scleral icterus.        Right eye: No discharge.         Left eye: No discharge.      Extraocular Movements: Extraocular movements intact.      Conjunctiva/sclera: Conjunctivae normal.      Pupils: Pupils are equal, round, and reactive to light.   Cardiovascular:      Rate and Rhythm: Normal rate and regular rhythm.      Pulses: Normal pulses.      Heart sounds: Normal heart sounds. No murmur heard.     No gallop.   Pulmonary:      Effort: Pulmonary effort is normal. No respiratory distress.      Breath sounds: Normal breath sounds. No wheezing or rales.   Abdominal:      General: Abdomen is flat. Bowel sounds are normal. There is no distension.      Palpations: Abdomen is soft.      Tenderness: There is no abdominal " tenderness. There is no right CVA tenderness, left CVA tenderness or guarding.   Musculoskeletal:         General: No swelling or tenderness. Normal range of motion.      Cervical back: Normal range of motion and neck supple. No rigidity.      Right lower leg: No edema.      Left lower leg: No edema.   Lymphadenopathy:      Cervical: No cervical adenopathy.   Skin:     General: Skin is warm and dry.      Capillary Refill: Capillary refill takes 2 to 3 seconds.      Coloration: Skin is not jaundiced or pale.      Findings: No bruising.   Neurological:      General: No focal deficit present.      Mental Status: He is alert and oriented to person, place, and time. Mental status is at baseline.      Sensory: No sensory deficit.      Motor: No weakness.   Psychiatric:         Mood and Affect: Mood normal.         Behavior: Behavior normal.

## 2024-12-18 ENCOUNTER — OFFICE VISIT (OUTPATIENT)
Dept: OBGYN CLINIC | Facility: CLINIC | Age: 89
End: 2024-12-18
Payer: COMMERCIAL

## 2024-12-18 VITALS — WEIGHT: 190 LBS | BODY MASS INDEX: 28.14 KG/M2 | HEIGHT: 69 IN

## 2024-12-18 DIAGNOSIS — Z98.890 S/P MUSCULOSKELETAL SYSTEM SURGERY: Primary | ICD-10-CM

## 2024-12-18 DIAGNOSIS — S61.219D: ICD-10-CM

## 2024-12-18 DIAGNOSIS — S63.259S DISLOCATION OF FINGER, SEQUELA: ICD-10-CM

## 2024-12-18 PROCEDURE — 99212 OFFICE O/P EST SF 10 MIN: CPT | Performed by: SURGERY

## 2024-12-18 NOTE — PROGRESS NOTES
ASSESSMENT/PLAN:      89 year old male here for his Exploration of Left Index, Long, and Ring fingers; I&D of traumatic arthotomy of the left ring finger with partial tendon repair; I&D of left index finger with partial extensor tendon repair; I&D of left long finger proximal phalax fracture & traumatic arthotomy with extensor tendon reconstruction - Left, 5/24/2024.  Overall Bay is doing very well. He has completed therapy and is able to use the hand for opening doors and other activities. Passively he can get the injured PIP joints to about 90, actively this is a bit less. He was advised to continue heat and HEP on his own but may follow up as needed for this issue. He may develop arthritic pains in the fingers which can be addressed with injections if needed down the line.     The patient verbalized understanding of exam findings and treatment plan. We engaged in the shared decision-making process and treatment options were discussed at length with the patient. Surgical and conservative management discussed today along with risks and benefits.    Diagnoses and all orders for this visit:    S/P musculoskeletal system surgery    Laceration of finger of left hand with tendon involvement, subsequent encounter    Dislocation of finger, sequela        Follow Up:  Return if symptoms worsen or fail to improve.      To Do Next Visit:  Re-evaluation of current issue    ____________________________________________________________________________________________________________________________________________      CHIEF COMPLAINT:  Chief Complaint   Patient presents with    Follow-up     Patient is doing well no complaints        SUBJECTIVE:  Bay Real is a 89 y.o. year old RHD male who presents today for a 6 week follow up for his left hand. He is 3 1/2 months s/p Exploration of Left Index, Long, and Ring fingers; I&D of traumatic arthotomy of the left ring finger with partial tendon repair; I&D of left index  finger with partial extensor tendon repair; I&D of left long finger proximal phalax fracture & traumatic arthotomy with extensor tendon reconstruction - Left, 5/24/2024.  He is happy with his progress with his therapist and has completed therapy. He reports being able to do more with the hand including opening doors        I have personally reviewed all the relevant PMH, PSH, SH, FH, Medications and allergies.     PAST MEDICAL HISTORY:  Past Medical History:   Diagnosis Date    BPH with obstruction/lower urinary tract symptoms     Cataract     Diverticulitis     Diverticulosis     Dizziness     Ear problems     Elevated PSA     Hypercholesterolemia     Hypertension     Osteoarthritis     Poor urinary stream     Vertigo        PAST SURGICAL HISTORY:  Past Surgical History:   Procedure Laterality Date    CATARACT EXTRACTION Left     COLONOSCOPY  02/16/2004    KNEE SURGERY Left 2005    DC RPR/ADVMNT FLXR TDN N/Z/2 W/O FR GRAFT EA TENDON Left 5/24/2024    Procedure: Exploration of Left Index, Long, and Ring fingers; I&D of traumatic arthotomy of the left ring finger with partial tendon repair; I&D of left index finger with partial extensor tendon repair; I&D of left long finger proximal phalax fracture & traumatic arthotomy with extensor tendon reconstruction;  Surgeon: Alonzo Rodrigues MD;  Location: [facility];  Service: Orthopedics    PROSTATE BIOPSY  2005    ROTATOR CUFF REPAIR Right 06/15/2015       FAMILY HISTORY:  Family History   Problem Relation Age of Onset    Diabetes Mother     Heart disease Mother     Heart attack Mother     Cancer Father     Diabetes Father        SOCIAL HISTORY:  Social History     Tobacco Use    Smoking status: Former     Types: Cigarettes     Passive exposure: Past    Smokeless tobacco: Never   Vaping Use    Vaping status: Never Used   Substance Use Topics    Alcohol use: Yes     Alcohol/week: 14.0 standard drinks of alcohol     Types: 14 Cans of beer per week     Comment: 1-2 beers  a day    Drug use: No       MEDICATIONS:    Current Outpatient Medications:     aspirin 81 MG tablet, Daily, Disp: , Rfl:     atorvastatin (LIPITOR) 40 mg tablet, TAKE 1 TABLET BY MOUTH EVERYDAY AT BEDTIME, Disp: 90 tablet, Rfl: 1    Cholecalciferol (VITAMIN D3) 2000 units capsule, Daily, Disp: , Rfl:     cycloSPORINE (Restasis) 0.05 % ophthalmic emulsion, Restasis 0.05 % eye drops in a dropperette  PUT 1 DROP INTO BOTH EYES TWICE A DAY, Disp: , Rfl:     lisinopril (ZESTRIL) 10 mg tablet, TAKE 1 TABLET BY MOUTH EVERY DAY, Disp: 90 tablet, Rfl: 1    meclizine (ANTIVERT) 25 mg tablet, Take 1 tablet (25 mg total) by mouth 3 (three) times a day, Disp: 90 tablet, Rfl: 0    ALLERGIES:  No Known Allergies    REVIEW OF SYSTEMS:  Review of Systems   Constitutional:  Negative for chills, fever and unexpected weight change.   HENT:  Negative for hearing loss, nosebleeds and sore throat.    Eyes:  Negative for pain, redness and visual disturbance.   Respiratory:  Negative for cough, shortness of breath and wheezing.    Cardiovascular:  Negative for chest pain, palpitations and leg swelling.   Gastrointestinal:  Negative for abdominal pain, nausea and vomiting.   Endocrine: Negative for polydipsia and polyuria.   Genitourinary:  Negative for dysuria and hematuria.   Skin:  Negative for rash and wound.   Neurological:  Negative for dizziness, light-headedness and headaches.   Psychiatric/Behavioral:  Negative for decreased concentration, dysphoric mood and suicidal ideas. The patient is not nervous/anxious.        VITALS:  There were no vitals filed for this visit.        _____________________________________________________  PHYSICAL EXAMINATION:  General: well developed and well nourished, alert, oriented times 3, and appears comfortable  Psychiatric: Normal  HEENT: Normocephalic, Atraumatic Trachea Midline, No torticollis  Pulmonary: No audible wheezing or respiratory distress   Cardiovascular: No pitting edema, 2+ radial  pulse   Abdominal/GI: abdomen non tender, non distended   Skin: No masses, erythema, lacerations, fluctation, ulcerations  Neurovascular: Sensation Intact to the Median, Ulnar, Radial Nerve, Motor Intact to the Median, Ulnar, Radial Nerve, and Pulses Intact  Musculoskeletal: Normal, except as noted in detailed exam and in HPI.      MUSCULOSKELETAL EXAMINATION:    Left hand:     Well healed surgical incision PIP of long finger  +2 DPC of long finger  +2 DPC of ring finger  +1 DPC of small finger  Opposition intact  Sensation intact distally  Brisk capillary refill 2+  ___________________________________________________    STUDIES REVIEWED:  No images reviewed at today's visit    LABS REVIEWED:    HgA1c:   Lab Results   Component Value Date    HGBA1C 5.9 (H) 09/02/2021     BMP:   Lab Results   Component Value Date    CALCIUM 8.7 05/21/2024    K 4.2 05/21/2024    CO2 23 05/21/2024     05/21/2024    BUN 18 05/21/2024    CREATININE 1.07 05/21/2024         PROCEDURES PERFORMED:  Procedures  No Procedures performed today    _____________________________________________________

## 2025-01-10 DIAGNOSIS — I10 ESSENTIAL (PRIMARY) HYPERTENSION: ICD-10-CM

## 2025-01-10 RX ORDER — LISINOPRIL 10 MG/1
10 TABLET ORAL DAILY
Qty: 90 TABLET | Refills: 1 | Status: SHIPPED | OUTPATIENT
Start: 2025-01-10

## 2025-02-19 ENCOUNTER — APPOINTMENT (OUTPATIENT)
Dept: LAB | Facility: CLINIC | Age: OVER 89
End: 2025-02-19
Payer: COMMERCIAL

## 2025-02-19 DIAGNOSIS — I10 ESSENTIAL HYPERTENSION: ICD-10-CM

## 2025-02-19 DIAGNOSIS — E78.2 MIXED HYPERLIPIDEMIA: ICD-10-CM

## 2025-02-19 DIAGNOSIS — E55.9 VITAMIN D DEFICIENCY: ICD-10-CM

## 2025-02-19 LAB
25(OH)D3 SERPL-MCNC: 53.1 NG/ML (ref 30–100)
ALBUMIN SERPL BCG-MCNC: 3.9 G/DL (ref 3.5–5)
ALP SERPL-CCNC: 78 U/L (ref 34–104)
ALT SERPL W P-5'-P-CCNC: 23 U/L (ref 7–52)
ANION GAP SERPL CALCULATED.3IONS-SCNC: 5 MMOL/L (ref 4–13)
AST SERPL W P-5'-P-CCNC: 27 U/L (ref 13–39)
BACTERIA UR QL AUTO: ABNORMAL /HPF
BASOPHILS # BLD AUTO: 0.04 THOUSANDS/ΜL (ref 0–0.1)
BASOPHILS NFR BLD AUTO: 1 % (ref 0–1)
BILIRUB SERPL-MCNC: 0.95 MG/DL (ref 0.2–1)
BILIRUB UR QL STRIP: NEGATIVE
BUN SERPL-MCNC: 16 MG/DL (ref 5–25)
CALCIUM SERPL-MCNC: 9.6 MG/DL (ref 8.4–10.2)
CHLORIDE SERPL-SCNC: 103 MMOL/L (ref 96–108)
CHOLEST SERPL-MCNC: 124 MG/DL (ref ?–200)
CLARITY UR: CLEAR
CO2 SERPL-SCNC: 29 MMOL/L (ref 21–32)
COLOR UR: ABNORMAL
CREAT SERPL-MCNC: 1.15 MG/DL (ref 0.6–1.3)
EOSINOPHIL # BLD AUTO: 0.08 THOUSAND/ΜL (ref 0–0.61)
EOSINOPHIL NFR BLD AUTO: 1 % (ref 0–6)
ERYTHROCYTE [DISTWIDTH] IN BLOOD BY AUTOMATED COUNT: 13 % (ref 11.6–15.1)
GFR SERPL CREATININE-BSD FRML MDRD: 56 ML/MIN/1.73SQ M
GLUCOSE P FAST SERPL-MCNC: 148 MG/DL (ref 65–99)
GLUCOSE UR STRIP-MCNC: NEGATIVE MG/DL
HCT VFR BLD AUTO: 48.9 % (ref 36.5–49.3)
HDLC SERPL-MCNC: 39 MG/DL
HGB BLD-MCNC: 15.5 G/DL (ref 12–17)
HGB UR QL STRIP.AUTO: NEGATIVE
IMM GRANULOCYTES # BLD AUTO: 0.02 THOUSAND/UL (ref 0–0.2)
IMM GRANULOCYTES NFR BLD AUTO: 0 % (ref 0–2)
KETONES UR STRIP-MCNC: NEGATIVE MG/DL
LDLC SERPL CALC-MCNC: 51 MG/DL (ref 0–100)
LEUKOCYTE ESTERASE UR QL STRIP: NEGATIVE
LYMPHOCYTES # BLD AUTO: 2.82 THOUSANDS/ΜL (ref 0.6–4.47)
LYMPHOCYTES NFR BLD AUTO: 36 % (ref 14–44)
MCH RBC QN AUTO: 31.1 PG (ref 26.8–34.3)
MCHC RBC AUTO-ENTMCNC: 31.7 G/DL (ref 31.4–37.4)
MCV RBC AUTO: 98 FL (ref 82–98)
MONOCYTES # BLD AUTO: 0.74 THOUSAND/ΜL (ref 0.17–1.22)
MONOCYTES NFR BLD AUTO: 9 % (ref 4–12)
NEUTROPHILS # BLD AUTO: 4.23 THOUSANDS/ΜL (ref 1.85–7.62)
NEUTS SEG NFR BLD AUTO: 53 % (ref 43–75)
NITRITE UR QL STRIP: NEGATIVE
NON-SQ EPI CELLS URNS QL MICRO: ABNORMAL /HPF
NRBC BLD AUTO-RTO: 0 /100 WBCS
PH UR STRIP.AUTO: 6 [PH]
PLATELET # BLD AUTO: 217 THOUSANDS/UL (ref 149–390)
PMV BLD AUTO: 9.9 FL (ref 8.9–12.7)
POTASSIUM SERPL-SCNC: 5.2 MMOL/L (ref 3.5–5.3)
PROT SERPL-MCNC: 6.8 G/DL (ref 6.4–8.4)
PROT UR STRIP-MCNC: ABNORMAL MG/DL
RBC # BLD AUTO: 4.98 MILLION/UL (ref 3.88–5.62)
RBC #/AREA URNS AUTO: ABNORMAL /HPF
SODIUM SERPL-SCNC: 137 MMOL/L (ref 135–147)
SP GR UR STRIP.AUTO: 1.01 (ref 1–1.03)
TRIGL SERPL-MCNC: 172 MG/DL (ref ?–150)
UROBILINOGEN UR STRIP-ACNC: <2 MG/DL
WBC # BLD AUTO: 7.93 THOUSAND/UL (ref 4.31–10.16)
WBC #/AREA URNS AUTO: ABNORMAL /HPF

## 2025-02-19 PROCEDURE — 82306 VITAMIN D 25 HYDROXY: CPT

## 2025-02-19 PROCEDURE — 80061 LIPID PANEL: CPT

## 2025-02-19 PROCEDURE — 85025 COMPLETE CBC W/AUTO DIFF WBC: CPT

## 2025-02-19 PROCEDURE — 80053 COMPREHEN METABOLIC PANEL: CPT

## 2025-02-19 PROCEDURE — 36415 COLL VENOUS BLD VENIPUNCTURE: CPT

## 2025-02-24 ENCOUNTER — OFFICE VISIT (OUTPATIENT)
Age: OVER 89
End: 2025-02-24
Payer: COMMERCIAL

## 2025-02-24 VITALS
DIASTOLIC BLOOD PRESSURE: 70 MMHG | HEIGHT: 69 IN | TEMPERATURE: 95.4 F | RESPIRATION RATE: 16 BRPM | WEIGHT: 191.2 LBS | HEART RATE: 75 BPM | BODY MASS INDEX: 28.32 KG/M2 | OXYGEN SATURATION: 98 % | SYSTOLIC BLOOD PRESSURE: 132 MMHG

## 2025-02-24 DIAGNOSIS — N18.31 STAGE 3A CHRONIC KIDNEY DISEASE (CKD) (HCC): ICD-10-CM

## 2025-02-24 DIAGNOSIS — I73.9 PAD (PERIPHERAL ARTERY DISEASE) (HCC): ICD-10-CM

## 2025-02-24 DIAGNOSIS — I10 ESSENTIAL HYPERTENSION: Primary | ICD-10-CM

## 2025-02-24 DIAGNOSIS — Z00.00 MEDICARE ANNUAL WELLNESS VISIT, SUBSEQUENT: ICD-10-CM

## 2025-02-24 DIAGNOSIS — E78.2 MIXED HYPERLIPIDEMIA: ICD-10-CM

## 2025-02-24 DIAGNOSIS — R73.9 HYPERGLYCEMIA: ICD-10-CM

## 2025-02-24 PROCEDURE — G0439 PPPS, SUBSEQ VISIT: HCPCS

## 2025-02-24 PROCEDURE — G2211 COMPLEX E/M VISIT ADD ON: HCPCS

## 2025-02-24 PROCEDURE — 99214 OFFICE O/P EST MOD 30 MIN: CPT

## 2025-02-24 NOTE — ASSESSMENT & PLAN NOTE
Under control with diet and exercise we will continue to monitor fasting glucose and hemoglobin A1c    Orders:  •  Glucose, fasting; Future  •  Hemoglobin A1C; Future

## 2025-02-24 NOTE — PROGRESS NOTES
Name: Bay Real      : 1935      MRN: [id number]  Encounter Provider: Christopher Roy MD  Encounter Date: 2025   Encounter department: [facility] PRIMARY CARE    Assessment & Plan  Essential hypertension  Under control.  Continue lisinopril.  We will continue to monitor         Mixed hyperlipidemia  Under control.  Continue atorvastatin.  We will continue to monitor         Stage 3a chronic kidney disease (CKD) (HCC)  Lab Results   Component Value Date    EGFR 56 2025    EGFR 61 2024    EGFR 57 2024    CREATININE 1.15 2025    CREATININE 1.07 2024    CREATININE 1.14 2024   creatinine and GFR stable and improving  Will need periodic BMP  Avoid NSAIDs like ibuprofen Aleve Advil etc.  Avoid high potassium diet.  We will continue to monitor            PAD (peripheral artery disease) (HCC)  Under control.    Continue current medication.    We will re-evaluate at next office visit.           Hyperglycemia  Under control with diet and exercise we will continue to monitor fasting glucose and hemoglobin A1c    Orders:  •  Glucose, fasting; Future  •  Hemoglobin A1C; Future    Medicare annual wellness visit, subsequent            Preventive health issues were discussed with patient, and age appropriate screening tests were ordered as noted in patient's After Visit Summary. Personalized health advice and appropriate referrals for health education or preventive services given if needed, as noted in patient's After Visit Summary.    History of Present Illness     Patient here for review of chronic medical problems and  the labs and imaging if it is applicable.  Currently has no specific complaints other than mentioned in the review of systems  Denies chest pain, SOB, cough, abdominal pain, nausea, vomiting, fever, chills, lightheadedness, dizziness,headache, tingling or numbness.No bowel or bladder problem.      Knee Pain   Pertinent negatives include no  numbness.      Patient Care Team:  Christopher Roy MD as PCP - General (Internal Medicine)    Review of Systems   Constitutional:  Negative for chills, fatigue and fever.   HENT:  Positive for hearing loss (decreased both ears). Negative for congestion, ear pain (Discomfort both ears), rhinorrhea, sneezing and sore throat.    Eyes:  Positive for visual disturbance. Negative for redness and itching.   Respiratory:  Negative for cough, chest tightness and shortness of breath.    Cardiovascular:  Negative for chest pain, palpitations and leg swelling.   Gastrointestinal:  Negative for abdominal pain, blood in stool, diarrhea, nausea and vomiting.   Endocrine: Negative for cold intolerance and heat intolerance.   Genitourinary:  Negative for dysuria, frequency and urgency.   Musculoskeletal:  Positive for arthralgias and gait problem. Negative for back pain and myalgias.   Skin:  Negative for color change and rash.   Neurological:  Positive for dizziness. Negative for weakness, light-headedness, numbness and headaches.   Hematological:  Does not bruise/bleed easily.   Psychiatric/Behavioral:  Negative for agitation, behavioral problems and confusion.      Medical History Reviewed by provider this encounter:  Tobacco  Allergies  Meds  Problems  Med Hx  Surg Hx  Fam Hx       Annual Wellness Visit Questionnaire   Bay is here for his Subsequent Wellness visit. Last Medicare Wellness visit information reviewed, patient interviewed, no change since last AWV.     Health Risk Assessment:   Patient rates overall health as good. Patient feels that their physical health rating is same. Patient is satisfied with their life. Eyesight was rated as same. Hearing was rated as same. Patient feels that their emotional and mental health rating is same. Patients states they are never, rarely angry. Patient states they are sometimes unusually tired/fatigued. Pain experienced in the last 7 days has been some. Patient's pain rating  has been 3/10. Patient states that he has experienced no weight loss or gain in last 6 months.     Fall Risk Screening:   In the past year, patient has experienced: history of falling in past year      Home Safety:  Patient does not have trouble with stairs inside or outside of their home. Patient has no working smoke alarms and has no working carbon monoxide detector. Home safety hazards include: none.     Nutrition:   Current diet is Regular, Low Cholesterol, No Added Salt and Limited junk food.     Medications:   Patient is currently taking over-the-counter supplements. OTC medications include: see medication list. Patient is able to manage medications.     Activities of Daily Living (ADLs)/Instrumental Activities of Daily Living (IADLs):   Walk and transfer into and out of bed and chair?: Yes  Dress and groom yourself?: Yes    Bathe or shower yourself?: Yes    Feed yourself? Yes  Do your laundry/housekeeping?: Yes  Manage your money, pay your bills and track your expenses?: Yes  Make your own meals?: Yes    Do your own shopping?: Yes    Previous Hospitalizations:   Any hospitalizations or ED visits within the last 12 months?: Yes    How many hospitalizations have you had in the last year?: 1-2    Advance Care Planning:   Living will: No    Durable POA for healthcare: No    Advanced directive: No      Cognitive Screening:   Provider or family/friend/caregiver concerned regarding cognition?: No    PREVENTIVE SCREENINGS      Cardiovascular Screening:    General: Screening Not Indicated and History Lipid Disorder      Diabetes Screening:     General: Screening Current      Colorectal Cancer Screening:     General: Screening Not Indicated      Prostate Cancer Screening:    General: Screening Not Indicated      Abdominal Aortic Aneurysm (AAA) Screening:    Risk factors include: tobacco use        Lung Cancer Screening:     General: Screening Not Indicated    Screening, Brief Intervention, and Referral to Treatment  (SBIRT)     Screening  Typical number of drinks in a day: 0  Typical number of drinks in a week: 1  Interpretation: Low risk drinking behavior.    AUDIT-C Screenin) How often did you have a drink containing alcohol in the past year? 2 to 4 times a month  2) How many drinks did you have on a typical day when you were drinking in the past year? 1 to 2  3) How often did you have 6 or more drinks on one occasion in the past year? never    AUDIT-C Score: 2  Interpretation: Score 0-3 (male): Negative screen for alcohol misuse    Single Item Drug Screening:  How often have you used an illegal drug (including marijuana) or a prescription medication for non-medical reasons in the past year? never    Single Item Drug Screen Score: 0  Interpretation: Negative screen for possible drug use disorder    Other Counseling Topics:   Car/seat belt/driving safety, skin self-exam, sunscreen and calcium and vitamin D intake and regular weightbearing exercise. Does 1,000 curls with 4lb dumbells , Regular stretching    Social Drivers of Health     Financial Resource Strain: Low Risk  (2024)    Overall Financial Resource Strain (CARDIA)    • Difficulty of Paying Living Expenses: Not hard at all   Food Insecurity: Patient Declined (2025)    Hunger Vital Sign    • Worried About Running Out of Food in the Last Year: Patient declined    • Ran Out of Food in the Last Year: Patient declined   Transportation Needs: Patient Declined (2025)    PRAPARE - Transportation    • Lack of Transportation (Medical): Patient declined    • Lack of Transportation (Non-Medical): Patient declined   Housing Stability: Patient Declined (2025)    Housing Stability Vital Sign    • Unable to Pay for Housing in the Last Year: Patient declined    • Number of Times Moved in the Last Year: 0    • Homeless in the Last Year: Patient declined   Utilities: Patient Declined (2025)    [facility] Utilities    • Threatened with loss of utilities: Patient  "declined     No results found.    Objective   /70 (BP Location: Right arm, Patient Position: Sitting, Cuff Size: Standard)   Pulse 75   Temp (!) 95.4 °F (35.2 °C) (Tympanic)   Resp 16   Ht 5' 9\" (1.753 m)   Wt 86.7 kg (191 lb 3.2 oz)   SpO2 98%   BMI 28.24 kg/m²     Physical Exam  Vitals and nursing note reviewed.   Constitutional:       General: He is not in acute distress.     Appearance: Normal appearance. He is well-developed and normal weight. He is not ill-appearing, toxic-appearing or diaphoretic.   HENT:      Head: Normocephalic and atraumatic.      Right Ear: Tympanic membrane, ear canal and external ear normal. There is no impacted cerumen.      Left Ear: Tympanic membrane, ear canal and external ear normal. There is no impacted cerumen.      Nose: Nose normal. No congestion or rhinorrhea.      Mouth/Throat:      Mouth: Mucous membranes are moist.      Pharynx: Oropharynx is clear. No posterior oropharyngeal erythema.   Eyes:      General: No scleral icterus.        Right eye: No discharge.         Left eye: No discharge.      Extraocular Movements: Extraocular movements intact.      Conjunctiva/sclera: Conjunctivae normal.      Pupils: Pupils are equal, round, and reactive to light.   Cardiovascular:      Rate and Rhythm: Normal rate and regular rhythm.      Pulses: Normal pulses.      Heart sounds: Normal heart sounds. No murmur heard.     No gallop.   Pulmonary:      Effort: Pulmonary effort is normal. No respiratory distress.      Breath sounds: Normal breath sounds. No wheezing or rales.   Abdominal:      General: Abdomen is flat. Bowel sounds are normal. There is no distension.      Palpations: Abdomen is soft.      Tenderness: There is no abdominal tenderness. There is no right CVA tenderness, left CVA tenderness or guarding.   Musculoskeletal:         General: No swelling or tenderness. Normal range of motion.      Cervical back: Normal range of motion and neck supple. No rigidity.     "  Right lower leg: No edema.      Left lower leg: No edema.   Lymphadenopathy:      Cervical: No cervical adenopathy.   Skin:     General: Skin is warm and dry.      Capillary Refill: Capillary refill takes 2 to 3 seconds.      Coloration: Skin is not jaundiced or pale.      Findings: No bruising.   Neurological:      General: No focal deficit present.      Mental Status: He is alert and oriented to person, place, and time. Mental status is at baseline.      Sensory: No sensory deficit.      Motor: No weakness.   Psychiatric:         Mood and Affect: Mood normal.         Behavior: Behavior normal.

## 2025-02-24 NOTE — PATIENT INSTRUCTIONS
Medicare Preventive Visit Patient Instructions  Thank you for completing your Welcome to Medicare Visit or Medicare Annual Wellness Visit today. Your next wellness visit will be due in one year (2/25/2026).  The screening/preventive services that you may require over the next 5-10 years are detailed below. Some tests may not apply to you based off risk factors and/or age. Screening tests ordered at today's visit but not completed yet may show as past due. Also, please note that scanned in results may not display below.  Preventive Screenings:  Service Recommendations Previous Testing/Comments   Colorectal Cancer Screening  Colonoscopy    Fecal Occult Blood Test (FOBT)/Fecal Immunochemical Test (FIT)  Fecal DNA/Cologuard Test  Flexible Sigmoidoscopy Age: 45-75 years old   Colonoscopy: every 10 years (May be performed more frequently if at higher risk)  OR  FOBT/FIT: every 1 year  OR  Cologuard: every 3 years  OR  Sigmoidoscopy: every 5 years  Screening may be recommended earlier than age 45 if at higher risk for colorectal cancer. Also, an individualized decision between you and your healthcare provider will decide whether screening between the ages of 76-85 would be appropriate. Colonoscopy: Not on file  FOBT/FIT: Not on file  Cologuard: Not on file  Sigmoidoscopy: Not on file    Screening Not Indicated     Prostate Cancer Screening Individualized decision between patient and health care provider in men between ages of 55-69   Medicare will cover every 12 months beginning on the day after your 50th birthday PSA: 6.9 ng/mL     Screening Not Indicated     Hepatitis C Screening Once for adults born between 1945 and 1965  More frequently in patients at high risk for Hepatitis C Hep C Antibody: Not on file        Diabetes Screening 1-2 times per year if you're at risk for diabetes or have pre-diabetes Fasting glucose: 148 mg/dL (2/19/2025)  A1C: 5.9 % (9/2/2021)  Screening Current   Cholesterol Screening Once every 5  years if you don't have a lipid disorder. May order more often based on risk factors. Lipid panel: 02/19/2025  Screening Not Indicated  History Lipid Disorder      Other Preventive Screenings Covered by Medicare:  Abdominal Aortic Aneurysm (AAA) Screening: covered once if your at risk. You're considered to be at risk if you have a family history of AAA or a male between the age of 65-75 who smoking at least 100 cigarettes in your lifetime.  Lung Cancer Screening: covers low dose CT scan once per year if you meet all of the following conditions: (1) Age 55-77; (2) No signs or symptoms of lung cancer; (3) Current smoker or have quit smoking within the last 15 years; (4) You have a tobacco smoking history of at least 20 pack years (packs per day x number of years you smoked); (5) You get a written order from a healthcare provider.  Glaucoma Screening: covered annually if you're considered high risk: (1) You have diabetes OR (2) Family history of glaucoma OR (3)  aged 50 and older OR (4)  American aged 65 and older  Osteoporosis Screening: covered every 2 years if you meet one of the following conditions: (1) Have a vertebral abnormality; (2) On glucocorticoid therapy for more than 3 months; (3) Have primary hyperparathyroidism; (4) On osteoporosis medications and need to assess response to drug therapy.  HIV Screening: covered annually if you're between the age of 15-65. Also covered annually if you are younger than 15 and older than 65 with risk factors for HIV infection. For pregnant patients, it is covered up to 3 times per pregnancy.    Immunizations:  Immunization Recommendations   Influenza Vaccine Annual influenza vaccination during flu season is recommended for all persons aged >= 6 months who do not have contraindications   Pneumococcal Vaccine   * Pneumococcal conjugate vaccine = PCV13 (Prevnar 13), PCV15 (Vaxneuvance), PCV20 (Prevnar 20)  * Pneumococcal polysaccharide vaccine = PPSV23  (Pneumovax) Adults 19-65 yo with certain risk factors or if 65+ yo  If never received any pneumonia vaccine: recommend Prevnar 20 (PCV20)  Give PCV20 if previously received 1 dose of PCV13 or PPSV23   Hepatitis B Vaccine 3 dose series if at intermediate or high risk (ex: diabetes, end stage renal disease, liver disease)   Respiratory syncytial virus (RSV) Vaccine - COVERED BY MEDICARE PART D  * RSVPreF3 (Arexvy) CDC recommends that adults 60 years of age and older may receive a single dose of RSV vaccine using shared clinical decision-making (SCDM)   Tetanus (Td) Vaccine - COST NOT COVERED BY MEDICARE PART B Following completion of primary series, a booster dose should be given every 10 years to maintain immunity against tetanus. Td may also be given as tetanus wound prophylaxis.   Tdap Vaccine - COST NOT COVERED BY MEDICARE PART B Recommended at least once for all adults. For pregnant patients, recommended with each pregnancy.   Shingles Vaccine (Shingrix) - COST NOT COVERED BY MEDICARE PART B  2 shot series recommended in those 19 years and older who have or will have weakened immune systems or those 50 years and older     Health Maintenance Due:  There are no preventive care reminders to display for this patient.  Immunizations Due:  There are no preventive care reminders to display for this patient.  Advance Directives   What are advance directives?  Advance directives are legal documents that state your wishes and plans for medical care. These plans are made ahead of time in case you lose your ability to make decisions for yourself. Advance directives can apply to any medical decision, such as the treatments you want, and if you want to donate organs.   What are the types of advance directives?  There are many types of advance directives, and each state has rules about how to use them. You may choose a combination of any of the following:  Living will:  This is a written record of the treatment you want. You  can also choose which treatments you do not want, which to limit, and which to stop at a certain time. This includes surgery, medicine, IV fluid, and tube feedings.   Durable power of  for healthcare (DPAHC):  This is a written record that states who you want to make healthcare choices for you when you are unable to make them for yourself. This person, called a proxy, is usually a family member or a friend. You may choose more than 1 proxy.  Do not resuscitate (DNR) order:  A DNR order is used in case your heart stops beating or you stop breathing. It is a request not to have certain forms of treatment, such as CPR. A DNR order may be included in other types of advance directives.  Medical directive:  This covers the care that you want if you are in a coma, near death, or unable to make decisions for yourself. You can list the treatments you want for each condition. Treatment may include pain medicine, surgery, blood transfusions, dialysis, IV or tube feedings, and a ventilator (breathing machine).  Values history:  This document has questions about your views, beliefs, and how you feel and think about life. This information can help others choose the care that you would choose.  Why are advance directives important?  An advance directive helps you control your care. Although spoken wishes may be used, it is better to have your wishes written down. Spoken wishes can be misunderstood, or not followed. Treatments may be given even if you do not want them. An advance directive may make it easier for your family to make difficult choices about your care.   Fall Prevention    Fall prevention  includes ways to make your home and other areas safer. It also includes ways you can move more carefully to prevent a fall. Health conditions that cause changes in your blood pressure, vision, or muscle strength and coordination may increase your risk for falls. Medicines may also increase your risk for falls if they make  you dizzy, weak, or sleepy.   Fall prevention tips:   Stand or sit up slowly.    Use assistive devices as directed.    Wear shoes that fit well and have soles that .    Wear a personal alarm.    Stay active.    Manage your medical conditions.    Home Safety Tips:  Add items to prevent falls in the bathroom.    Keep paths clear.    Install bright lights in your home.    Keep items you use often on shelves within reach.    Paint or place reflective tape on the edges of your stairs.    Weight Management   Why it is important to manage your weight:  Being overweight increases your risk of health conditions such as heart disease, high blood pressure, type 2 diabetes, and certain types of cancer. It can also increase your risk for osteoarthritis, sleep apnea, and other respiratory problems. Aim for a slow, steady weight loss. Even a small amount of weight loss can lower your risk of health problems.  How to lose weight safely:  A safe and healthy way to lose weight is to eat fewer calories and get regular exercise. You can lose up about 1 pound a week by decreasing the number of calories you eat by 500 calories each day.   Healthy meal plan for weight management:  A healthy meal plan includes a variety of foods, contains fewer calories, and helps you stay healthy. A healthy meal plan includes the following:  Eat whole-grain foods more often.  A healthy meal plan should contain fiber. Fiber is the part of grains, fruits, and vegetables that is not broken down by your body. Whole-grain foods are healthy and provide extra fiber in your diet. Some examples of whole-grain foods are whole-wheat breads and pastas, oatmeal, brown rice, and bulgur.  Eat a variety of vegetables every day.  Include dark, leafy greens such as spinach, kale, damaris greens, and mustard greens. Eat yellow and orange vegetables such as carrots, sweet potatoes, and winter squash.   Eat a variety of fruits every day.  Choose fresh or canned fruit  (canned in its own juice or light syrup) instead of juice. Fruit juice has very little or no fiber.  Eat low-fat dairy foods.  Drink fat-free (skim) milk or 1% milk. Eat fat-free yogurt and low-fat cottage cheese. Try low-fat cheeses such as mozzarella and other reduced-fat cheeses.  Choose meat and other protein foods that are low in fat.  Choose beans or other legumes such as split peas or lentils. Choose fish, skinless poultry (chicken or turkey), or lean cuts of red meat (beef or pork). Before you cook meat or poultry, cut off any visible fat.   Use less fat and oil.  Try baking foods instead of frying them. Add less fat, such as margarine, sour cream, regular salad dressing and mayonnaise to foods. Eat fewer high-fat foods. Some examples of high-fat foods include french fries, doughnuts, ice cream, and cakes.  Eat fewer sweets.  Limit foods and drinks that are high in sugar. This includes candy, cookies, regular soda, and sweetened drinks.  Exercise:  Exercise at least 30 minutes per day on most days of the week. Some examples of exercise include walking, biking, dancing, and swimming. You can also fit in more physical activity by taking the stairs instead of the elevator or parking farther away from stores. Ask your healthcare provider about the best exercise plan for you.      © Copyright [employer] 2018 Information is for End User's use only and may not be sold, redistributed or otherwise used for commercial purposes. All illustrations and images included in CareNotes® are the copyrighted property of [employer]M., Inc[employer] or [employer]

## 2025-02-24 NOTE — ASSESSMENT & PLAN NOTE
Lab Results   Component Value Date    EGFR 56 02/19/2025    EGFR 61 05/21/2024    EGFR 57 01/23/2024    CREATININE 1.15 02/19/2025    CREATININE 1.07 05/21/2024    CREATININE 1.14 01/23/2024   creatinine and GFR stable and improving  Will need periodic BMP  Avoid NSAIDs like ibuprofen Aleve Advil etc.  Avoid high potassium diet.  We will continue to monitor

## 2025-02-27 NOTE — PROGRESS NOTES
Patient requested a rollator. Patient was seen in the office 02/24/2025. Ulysses order was placed.

## 2025-02-28 LAB
DME PARACHUTE DELIVERY DATE ACTUAL: NORMAL
DME PARACHUTE DELIVERY DATE REQUESTED: NORMAL
DME PARACHUTE ITEM DESCRIPTION: NORMAL
DME PARACHUTE ITEM DESCRIPTION: NORMAL
DME PARACHUTE ORDER STATUS: NORMAL
DME PARACHUTE SUPPLIER NAME: NORMAL
DME PARACHUTE SUPPLIER PHONE: NORMAL

## 2025-03-25 DIAGNOSIS — I65.29 CAROTID STENOSIS: ICD-10-CM

## 2025-03-25 RX ORDER — ATORVASTATIN CALCIUM 40 MG/1
40 TABLET, FILM COATED ORAL
Qty: 90 TABLET | Refills: 1 | Status: SHIPPED | OUTPATIENT
Start: 2025-03-25

## 2025-06-11 ENCOUNTER — APPOINTMENT (OUTPATIENT)
Dept: LAB | Facility: HOSPITAL | Age: OVER 89
End: 2025-06-11
Payer: COMMERCIAL

## 2025-06-11 DIAGNOSIS — R73.9 HYPERGLYCEMIA: ICD-10-CM

## 2025-06-11 LAB
EST. AVERAGE GLUCOSE BLD GHB EST-MCNC: 151 MG/DL
GLUCOSE P FAST SERPL-MCNC: 122 MG/DL (ref 65–99)
HBA1C MFR BLD: 6.9 %

## 2025-06-11 PROCEDURE — 36415 COLL VENOUS BLD VENIPUNCTURE: CPT

## 2025-06-11 PROCEDURE — 83036 HEMOGLOBIN GLYCOSYLATED A1C: CPT

## 2025-06-11 PROCEDURE — 82947 ASSAY GLUCOSE BLOOD QUANT: CPT

## 2025-06-19 ENCOUNTER — OFFICE VISIT (OUTPATIENT)
Age: OVER 89
End: 2025-06-19
Payer: COMMERCIAL

## 2025-06-19 VITALS
DIASTOLIC BLOOD PRESSURE: 79 MMHG | TEMPERATURE: 98 F | HEIGHT: 69 IN | BODY MASS INDEX: 27.91 KG/M2 | SYSTOLIC BLOOD PRESSURE: 148 MMHG | HEART RATE: 68 BPM | RESPIRATION RATE: 18 BRPM | WEIGHT: 188.4 LBS | OXYGEN SATURATION: 99 %

## 2025-06-19 DIAGNOSIS — E11.9 TYPE 2 DIABETES MELLITUS WITHOUT COMPLICATION, WITHOUT LONG-TERM CURRENT USE OF INSULIN (HCC): ICD-10-CM

## 2025-06-19 DIAGNOSIS — N18.31 STAGE 3A CHRONIC KIDNEY DISEASE (CKD) (HCC): ICD-10-CM

## 2025-06-19 DIAGNOSIS — I10 ESSENTIAL (PRIMARY) HYPERTENSION: ICD-10-CM

## 2025-06-19 DIAGNOSIS — E78.2 MIXED HYPERLIPIDEMIA: ICD-10-CM

## 2025-06-19 DIAGNOSIS — I10 ESSENTIAL HYPERTENSION: Primary | ICD-10-CM

## 2025-06-19 DIAGNOSIS — I73.9 PAD (PERIPHERAL ARTERY DISEASE) (HCC): ICD-10-CM

## 2025-06-19 PROBLEM — R73.9 HYPERGLYCEMIA: Status: RESOLVED | Noted: 2025-02-24 | Resolved: 2025-06-19

## 2025-06-19 PROCEDURE — 99214 OFFICE O/P EST MOD 30 MIN: CPT

## 2025-06-19 PROCEDURE — G2211 COMPLEX E/M VISIT ADD ON: HCPCS

## 2025-06-19 NOTE — PROGRESS NOTES
Name: Bay Real      : 1935      MRN: [id number]  Encounter Provider: Christopher Roy MD  Encounter Date: 2025   Encounter department: [facility]  :  Assessment & Plan  Essential hypertension  Under control.  Continue lisinopril.  We will continue to monitor         Mixed hyperlipidemia  Under control.  Continue atorvastatin.  We will continue to monitor         Stage 3a chronic kidney disease (CKD) (HCC)  Lab Results   Component Value Date    EGFR 56 2025    EGFR 61 2024    EGFR 57 2024    CREATININE 1.15 2025    CREATININE 1.07 2024    CREATININE 1.14 2024   creatinine and GFR stable and improving  Will need periodic BMP  Avoid NSAIDs like ibuprofen Aleve Advil etc.  Avoid high potassium diet.  We will continue to monitor            PAD (peripheral artery disease) (formerly [facility])  Under control.    Continue current medication.    We will re-evaluate at next office visit.           Type 2 diabetes mellitus without complication, without long-term current use of insulin (HCC)    Lab Results   Component Value Date    HGBA1C 6.9 (H) 2025   Newly diagnosed  Started on metformin 500 mg twice a day with food we will continue to monitor  Also follow 1800 ADA    Orders:  •  metFORMIN (GLUCOPHAGE) 500 mg tablet; Take 1 tablet (500 mg total) by mouth 2 (two) times a day with meals  •  Glucose, fasting; Future  •  Hemoglobin A1C; Future           History of Present Illness   Patient here for review of chronic medical problems and  the labs and imaging if it is applicable.  Currently has no specific complaints other than mentioned in the review of systems  Denies chest pain, SOB, cough, abdominal pain, nausea, vomiting, fever, chills, lheadache, tingling or numbness.No bowel or bladder problem.        Review of Systems   Constitutional:  Negative for chills, fatigue and fever.   HENT:  Positive for hearing loss (decreased both ears). Negative for  "congestion, ear pain (Discomfort both ears), rhinorrhea, sneezing and sore throat.    Eyes:  Positive for visual disturbance. Negative for redness and itching.   Respiratory:  Negative for cough, chest tightness and shortness of breath.    Cardiovascular:  Negative for chest pain, palpitations and leg swelling.   Gastrointestinal:  Negative for abdominal pain, blood in stool, diarrhea, nausea and vomiting.   Endocrine: Negative for cold intolerance and heat intolerance.   Genitourinary:  Negative for dysuria, frequency and urgency.   Musculoskeletal:  Positive for arthralgias and gait problem. Negative for back pain and myalgias.   Skin:  Negative for color change and rash.   Neurological:  Positive for dizziness. Negative for weakness, light-headedness, numbness and headaches.   Hematological:  Does not bruise/bleed easily.   Psychiatric/Behavioral:  Negative for agitation, behavioral problems and confusion.        Objective   /79 (BP Location: Right arm, Patient Position: Sitting, Cuff Size: Standard)   Pulse 68   Temp 98 °F (36.7 °C) (Temporal)   Resp 18   Ht 5' 9\" (1.753 m)   Wt 85.5 kg (188 lb 6.4 oz)   SpO2 99%   BMI 27.82 kg/m²      Physical Exam  Vitals and nursing note reviewed.   Constitutional:       General: He is not in acute distress.     Appearance: Normal appearance. He is well-developed and normal weight. He is not ill-appearing, toxic-appearing or diaphoretic.   HENT:      Head: Normocephalic and atraumatic.      Right Ear: Tympanic membrane, ear canal and external ear normal. There is no impacted cerumen.      Left Ear: Tympanic membrane, ear canal and external ear normal. There is no impacted cerumen.      Nose: Nose normal. No congestion or rhinorrhea.      Mouth/Throat:      Mouth: Mucous membranes are moist.      Pharynx: Oropharynx is clear. No posterior oropharyngeal erythema.     Eyes:      General: No scleral icterus.        Right eye: No discharge.         Left eye: No " discharge.      Extraocular Movements: Extraocular movements intact.      Conjunctiva/sclera: Conjunctivae normal.      Pupils: Pupils are equal, round, and reactive to light.       Cardiovascular:      Rate and Rhythm: Normal rate and regular rhythm.      Pulses: Normal pulses.      Heart sounds: Normal heart sounds. No murmur heard.     No gallop.   Pulmonary:      Effort: Pulmonary effort is normal. No respiratory distress.      Breath sounds: Normal breath sounds. No wheezing or rales.   Abdominal:      General: Abdomen is flat. Bowel sounds are normal. There is no distension.      Palpations: Abdomen is soft.      Tenderness: There is no abdominal tenderness. There is no right CVA tenderness, left CVA tenderness or guarding.     Musculoskeletal:         General: No swelling or tenderness. Normal range of motion.      Cervical back: Normal range of motion and neck supple. No rigidity.      Right lower leg: No edema.      Left lower leg: No edema.   Lymphadenopathy:      Cervical: No cervical adenopathy.     Skin:     General: Skin is warm and dry.      Capillary Refill: Capillary refill takes 2 to 3 seconds.      Coloration: Skin is not jaundiced or pale.      Findings: No bruising.     Neurological:      General: No focal deficit present.      Mental Status: He is alert and oriented to person, place, and time. Mental status is at baseline.      Sensory: No sensory deficit.      Motor: No weakness.      Gait: Gait abnormal (Ambulates with cane).     Psychiatric:         Mood and Affect: Mood normal.         Behavior: Behavior normal.

## 2025-06-19 NOTE — ASSESSMENT & PLAN NOTE
Under control with diet and exercise we will continue to monitor fasting glucose and hemoglobin A1c    SmartLink not supported outside of the Encounter Diagnoses SmartSection.

## 2025-06-19 NOTE — ASSESSMENT & PLAN NOTE
Lab Results   Component Value Date    HGBA1C 6.9 (H) 06/11/2025   Newly diagnosed  Started on metformin 500 mg twice a day with food we will continue to monitor  Also follow 1800 ADA    Orders:  •  metFORMIN (GLUCOPHAGE) 500 mg tablet; Take 1 tablet (500 mg total) by mouth 2 (two) times a day with meals  •  Glucose, fasting; Future  •  Hemoglobin A1C; Future

## 2025-06-20 RX ORDER — LISINOPRIL 10 MG/1
10 TABLET ORAL DAILY
Qty: 90 TABLET | Refills: 1 | Status: SHIPPED | OUTPATIENT
Start: 2025-06-20

## 2025-07-12 DIAGNOSIS — E11.9 TYPE 2 DIABETES MELLITUS WITHOUT COMPLICATION, WITHOUT LONG-TERM CURRENT USE OF INSULIN (HCC): ICD-10-CM

## 2025-07-18 DIAGNOSIS — I65.29 CAROTID STENOSIS: ICD-10-CM

## 2025-07-20 RX ORDER — ATORVASTATIN CALCIUM 40 MG/1
40 TABLET, FILM COATED ORAL
Qty: 90 TABLET | Refills: 1 | Status: SHIPPED | OUTPATIENT
Start: 2025-07-20

## (undated) DEVICE — PADDING CAST 4 IN  COTTON STRL

## (undated) DEVICE — DRAPE C-ARM X-RAY

## (undated) DEVICE — SUT PROLENE 4-0 PS-2 18 IN 8682H

## (undated) DEVICE — SUT PROLENE 6-0 P-3 18 IN 8695G

## (undated) DEVICE — CHLORAPREP HI-LITE 26ML ORANGE

## (undated) DEVICE — DISPOSABLE EQUIPMENT COVER: Brand: SMALL TOWEL DRAPE

## (undated) DEVICE — GAUZE SPONGES,16 PLY: Brand: CURITY

## (undated) DEVICE — PADDING CAST 2IN COTTON STRL

## (undated) DEVICE — CUFF TOURNIQUET 18 X 4 IN QUICK CONNECT DISP 1 BLADDER

## (undated) DEVICE — STERILE BETHLEHEM PLASTIC HAND: Brand: CARDINAL HEALTH

## (undated) DEVICE — INTENDED FOR TISSUE SEPARATION, AND OTHER PROCEDURES THAT REQUIRE A SHARP SURGICAL BLADE TO PUNCTURE OR CUT.: Brand: BARD-PARKER ® CARBON RIB-BACK BLADES

## (undated) DEVICE — ACE WRAP 4 IN STERILE

## (undated) DEVICE — GLOVE INDICATOR PI UNDERGLOVE SZ 7 BLUE

## (undated) DEVICE — DRAPE SHEET THREE QUARTER

## (undated) DEVICE — ARM SLING: Brand: DEROYAL

## (undated) DEVICE — TUBING SUCTION 5MM X 12 FT

## (undated) DEVICE — GLOVE SRG BIOGEL 7

## (undated) DEVICE — SUT FIBERWIRE 3-0 3/8 CIRCLE T-43 18IN AR-7227-01

## (undated) DEVICE — SPLINT 4 X 15 IN FAST SET PLASTER

## (undated) DEVICE — WET SKIN PREP TRAY: Brand: MEDLINE INDUSTRIES, INC.

## (undated) DEVICE — BLADE MINI RND TIP ONE SIDE SHARP

## (undated) DEVICE — CURITY NON-ADHERENT STRIPS: Brand: CURITY

## (undated) DEVICE — CYSTO TUBING TUR Y IRRIGATION